# Patient Record
Sex: MALE | Race: WHITE | Employment: OTHER | URBAN - METROPOLITAN AREA
[De-identification: names, ages, dates, MRNs, and addresses within clinical notes are randomized per-mention and may not be internally consistent; named-entity substitution may affect disease eponyms.]

---

## 2018-05-31 PROBLEM — R55 SYNCOPE AND COLLAPSE: Status: ACTIVE | Noted: 2018-05-31

## 2018-05-31 PROBLEM — R07.9 CHEST PAIN: Status: ACTIVE | Noted: 2018-05-31

## 2018-05-31 PROBLEM — E04.1 THYROID NODULE: Status: ACTIVE | Noted: 2018-05-31

## 2018-05-31 PROBLEM — R79.89 ELEVATED SERUM CREATININE: Status: ACTIVE | Noted: 2018-05-31

## 2018-06-01 PROBLEM — R79.89 ELEVATED SERUM CREATININE: Status: RESOLVED | Noted: 2018-05-31 | Resolved: 2018-06-01

## 2018-06-01 PROBLEM — R07.9 CHEST PAIN: Status: RESOLVED | Noted: 2018-05-31 | Resolved: 2018-06-01

## 2018-06-01 PROBLEM — R55 SYNCOPE AND COLLAPSE: Status: RESOLVED | Noted: 2018-05-31 | Resolved: 2018-06-01

## 2018-11-05 ENCOUNTER — APPOINTMENT (OUTPATIENT)
Dept: GENERAL RADIOLOGY | Age: 63
End: 2018-11-05
Payer: OTHER GOVERNMENT

## 2018-11-05 ENCOUNTER — HOSPITAL ENCOUNTER (EMERGENCY)
Age: 63
Discharge: HOME | End: 2018-11-05
Attending: EMERGENCY MEDICINE
Payer: OTHER GOVERNMENT

## 2018-11-05 VITALS
RESPIRATION RATE: 15 BRPM | SYSTOLIC BLOOD PRESSURE: 145 MMHG | DIASTOLIC BLOOD PRESSURE: 78 MMHG | BODY MASS INDEX: 25.18 KG/M2 | TEMPERATURE: 98.7 F | HEIGHT: 73 IN | OXYGEN SATURATION: 96 % | HEART RATE: 100 BPM | WEIGHT: 190 LBS

## 2018-11-05 DIAGNOSIS — J06.9 ACUTE UPPER RESPIRATORY INFECTION: Primary | ICD-10-CM

## 2018-11-05 LAB
RAPID INFLUENZA  B AGN: NEGATIVE
RAPID INFLUENZA A AGN: NEGATIVE

## 2018-11-05 PROCEDURE — 6360000002 HC RX W HCPCS: Performed by: EMERGENCY MEDICINE

## 2018-11-05 PROCEDURE — 6370000000 HC RX 637 (ALT 250 FOR IP): Performed by: EMERGENCY MEDICINE

## 2018-11-05 PROCEDURE — 71046 X-RAY EXAM CHEST 2 VIEWS: CPT

## 2018-11-05 PROCEDURE — 99283 EMERGENCY DEPT VISIT LOW MDM: CPT

## 2018-11-05 PROCEDURE — 87804 INFLUENZA ASSAY W/OPTIC: CPT

## 2018-11-05 RX ORDER — ONDANSETRON 4 MG/1
4 TABLET, ORALLY DISINTEGRATING ORAL ONCE
Status: COMPLETED | OUTPATIENT
Start: 2018-11-05 | End: 2018-11-05

## 2018-11-05 RX ORDER — GUAIFENESIN/DEXTROMETHORPHAN 100-10MG/5
5 SYRUP ORAL 3 TIMES DAILY PRN
Qty: 120 ML | Refills: 0 | Status: SHIPPED | OUTPATIENT
Start: 2018-11-05 | End: 2018-11-13 | Stop reason: ALTCHOICE

## 2018-11-05 RX ORDER — HYDROCODONE BITARTRATE AND ACETAMINOPHEN 5; 325 MG/1; MG/1
2 TABLET ORAL ONCE
Status: COMPLETED | OUTPATIENT
Start: 2018-11-05 | End: 2018-11-05

## 2018-11-05 RX ADMIN — HYDROCODONE BITARTRATE AND ACETAMINOPHEN 2 TABLET: 5; 325 TABLET ORAL at 21:39

## 2018-11-05 RX ADMIN — ONDANSETRON 4 MG: 4 TABLET, ORALLY DISINTEGRATING ORAL at 21:40

## 2018-11-05 ASSESSMENT — PAIN DESCRIPTION - LOCATION: LOCATION: CHEST;HEAD

## 2018-11-05 ASSESSMENT — PAIN DESCRIPTION - FREQUENCY: FREQUENCY: INTERMITTENT

## 2018-11-05 ASSESSMENT — PAIN SCALES - GENERAL: PAINLEVEL_OUTOF10: 6

## 2018-11-06 NOTE — ED PROVIDER NOTES
mouth 3 times daily. .      glipiZIDE (GLUCOTROL) 10 MG tablet Take 10 mg by mouth 2 times daily (before meals).  metFORMIN (GLUCOPHAGE) 1000 MG tablet Take 1,000 mg by mouth 2 times daily (with meals).  insulin glargine (LANTUS) 100 UNIT/ML injection vial Inject 20 Units into the skin nightly       aspirin 81 MG tablet Take 81 mg by mouth daily.  simvastatin (ZOCOR) 10 MG tablet Take  by mouth nightly.  lisinopril (PRINIVIL) 10 MG tablet Take 1 tablet by mouth daily for 7 days. 7 tablet 0     Allergies   Allergen Reactions    Keflex [Cephalexin] Swelling     Nursing Notes Reviewed    Physical Exam:  ED Triage Vitals [11/05/18 2040]   Enc Vitals Group      /89      Pulse 104      Resp 19      Temp 98.7 °F (37.1 °C)      Temp Source Oral      SpO2 96 %      Weight 190 lb (86.2 kg)      Height 6' 1\" (1.854 m)      Head Circumference       Peak Flow       Pain Score       Pain Loc       Pain Edu? Excl. in [address]? GENERAL APPEARANCE: A well-developed well-nourished pleasant uncomfortable 80-year-old male in mild distress   HEAD: Normocephalic, atraumatic  EYES: Sclera anicteric.no conjunctival injection,   ENT: Mucous membranes moist, profuse clear nasal discharge, no stridor, mild posterior pharyngeal hyperemia without exudate   NECK: Supple, no meningismus, no adenopathy,   HEART: RRR without rubs murmurs or gallops  LUNGS:  Clear good air movement no wheezing no retraction or accessory muscle use,  ABDOMEN: Soft, non-tender to palpation, no guarding or rebound. , no mass or distention and no hepatosplenomegaly. EXTREMITIES: No acute deformities, no peripheral edema  SKIN: Warm and dry.  Normal color, no rash,  capillary refill less than 2 seconds  MENTAL STATUS: Alert, oriented, interactive,     Nursing note and vital signs reviewed     I have reviewed and interpreted all of the currently available lab results from this visit (if applicable):  Results for orders placed or

## 2018-11-13 ENCOUNTER — HOSPITAL ENCOUNTER (EMERGENCY)
Age: 63
Discharge: HOME | End: 2018-11-13
Payer: OTHER GOVERNMENT

## 2018-11-13 VITALS
HEART RATE: 86 BPM | RESPIRATION RATE: 17 BRPM | WEIGHT: 190 LBS | HEIGHT: 73 IN | DIASTOLIC BLOOD PRESSURE: 84 MMHG | SYSTOLIC BLOOD PRESSURE: 160 MMHG | TEMPERATURE: 97.4 F | BODY MASS INDEX: 25.18 KG/M2 | OXYGEN SATURATION: 98 %

## 2018-11-13 DIAGNOSIS — R05.9 COUGH: ICD-10-CM

## 2018-11-13 DIAGNOSIS — J01.00 ACUTE MAXILLARY SINUSITIS, RECURRENCE NOT SPECIFIED: Primary | ICD-10-CM

## 2018-11-13 PROCEDURE — 99283 EMERGENCY DEPT VISIT LOW MDM: CPT

## 2018-11-13 RX ORDER — AZITHROMYCIN 250 MG/1
TABLET, FILM COATED ORAL
Qty: 1 PACKET | Refills: 0 | Status: SHIPPED | OUTPATIENT
Start: 2018-11-13 | End: 2018-11-23

## 2018-11-13 RX ORDER — BROMPHENIRAMINE MALEATE, PSEUDOEPHEDRINE HYDROCHLORIDE, AND DEXTROMETHORPHAN HYDROBROMIDE 2; 30; 10 MG/5ML; MG/5ML; MG/5ML
10 SYRUP ORAL 4 TIMES DAILY PRN
Qty: 120 ML | Refills: 0 | Status: ON HOLD | OUTPATIENT
Start: 2018-11-13 | End: 2019-11-08 | Stop reason: ALTCHOICE

## 2018-11-13 ASSESSMENT — ENCOUNTER SYMPTOMS
BACK PAIN: 0
COUGH: 1
CONSTIPATION: 0
EYE REDNESS: 0
SORE THROAT: 0
SINUS PRESSURE: 1
DIARRHEA: 0
SINUS PAIN: 1
FACIAL SWELLING: 0
NAUSEA: 0
ABDOMINAL PAIN: 0
SHORTNESS OF BREATH: 0
EYE PAIN: 0
CHEST TIGHTNESS: 0
RHINORRHEA: 0

## 2018-11-13 ASSESSMENT — PAIN DESCRIPTION - DESCRIPTORS: DESCRIPTORS: ACHING

## 2018-11-13 ASSESSMENT — PAIN DESCRIPTION - PROGRESSION: CLINICAL_PROGRESSION: GRADUALLY WORSENING

## 2018-11-13 ASSESSMENT — PAIN DESCRIPTION - FREQUENCY: FREQUENCY: CONTINUOUS

## 2018-11-13 ASSESSMENT — PAIN DESCRIPTION - ONSET: ONSET: ON-GOING

## 2018-11-13 ASSESSMENT — PAIN DESCRIPTION - LOCATION: LOCATION: FACE

## 2018-11-13 ASSESSMENT — PAIN SCALES - GENERAL: PAINLEVEL_OUTOF10: 10

## 2018-11-13 ASSESSMENT — PAIN DESCRIPTION - ORIENTATION: ORIENTATION: RIGHT

## 2018-11-13 ASSESSMENT — PAIN DESCRIPTION - PAIN TYPE: TYPE: ACUTE PAIN

## 2019-03-02 ENCOUNTER — HOSPITAL ENCOUNTER (EMERGENCY)
Age: 64
Discharge: HOME | End: 2019-03-02
Attending: EMERGENCY MEDICINE
Payer: OTHER GOVERNMENT

## 2019-03-02 VITALS
HEIGHT: 73 IN | WEIGHT: 190 LBS | SYSTOLIC BLOOD PRESSURE: 101 MMHG | RESPIRATION RATE: 18 BRPM | BODY MASS INDEX: 25.18 KG/M2 | TEMPERATURE: 98.6 F | OXYGEN SATURATION: 92 % | HEART RATE: 111 BPM | DIASTOLIC BLOOD PRESSURE: 66 MMHG

## 2019-03-02 DIAGNOSIS — T40.1X1A ACCIDENTAL OVERDOSE OF HEROIN, INITIAL ENCOUNTER (HCC): Primary | ICD-10-CM

## 2019-03-02 LAB
GLUCOSE BLD-MCNC: 197 MG/DL (ref 70–99)
PERFORMED ON: ABNORMAL

## 2019-03-02 PROCEDURE — 99284 EMERGENCY DEPT VISIT MOD MDM: CPT

## 2019-03-02 ASSESSMENT — PAIN DESCRIPTION - LOCATION: LOCATION: LEG

## 2019-03-02 ASSESSMENT — PAIN DESCRIPTION - DESCRIPTORS: DESCRIPTORS: BURNING

## 2019-03-02 ASSESSMENT — PAIN DESCRIPTION - ORIENTATION: ORIENTATION: LEFT;UPPER

## 2019-03-02 ASSESSMENT — PAIN DESCRIPTION - FREQUENCY: FREQUENCY: CONTINUOUS

## 2019-03-02 ASSESSMENT — PAIN DESCRIPTION - PAIN TYPE: TYPE: ACUTE PAIN

## 2019-03-02 ASSESSMENT — PAIN SCALES - GENERAL: PAINLEVEL_OUTOF10: 2

## 2019-09-28 ENCOUNTER — APPOINTMENT (OUTPATIENT)
Dept: GENERAL RADIOLOGY | Age: 64
End: 2019-09-28
Payer: OTHER GOVERNMENT

## 2019-09-28 ENCOUNTER — HOSPITAL ENCOUNTER (EMERGENCY)
Age: 64
Discharge: HOME | End: 2019-09-28
Attending: EMERGENCY MEDICINE
Payer: OTHER GOVERNMENT

## 2019-09-28 VITALS
BODY MASS INDEX: 24.38 KG/M2 | RESPIRATION RATE: 20 BRPM | HEIGHT: 72 IN | TEMPERATURE: 97.4 F | WEIGHT: 180 LBS | OXYGEN SATURATION: 99 % | DIASTOLIC BLOOD PRESSURE: 86 MMHG | SYSTOLIC BLOOD PRESSURE: 124 MMHG | HEART RATE: 94 BPM

## 2019-09-28 DIAGNOSIS — L03.113 CELLULITIS OF ARM, RIGHT: Primary | ICD-10-CM

## 2019-09-28 PROCEDURE — 99283 EMERGENCY DEPT VISIT LOW MDM: CPT

## 2019-09-28 PROCEDURE — 6360000002 HC RX W HCPCS: Performed by: EMERGENCY MEDICINE

## 2019-09-28 PROCEDURE — 90715 TDAP VACCINE 7 YRS/> IM: CPT | Performed by: EMERGENCY MEDICINE

## 2019-09-28 PROCEDURE — 73090 X-RAY EXAM OF FOREARM: CPT

## 2019-09-28 PROCEDURE — 90471 IMMUNIZATION ADMIN: CPT | Performed by: EMERGENCY MEDICINE

## 2019-09-28 RX ORDER — DOXYCYCLINE HYCLATE 100 MG/1
100 CAPSULE ORAL 2 TIMES DAILY
Qty: 20 CAPSULE | Refills: 0 | Status: SHIPPED | OUTPATIENT
Start: 2019-09-28 | End: 2019-10-08

## 2019-09-28 RX ORDER — SULFAMETHOXAZOLE AND TRIMETHOPRIM 800; 160 MG/1; MG/1
1 TABLET ORAL 2 TIMES DAILY
Qty: 20 TABLET | Refills: 0 | Status: SHIPPED | OUTPATIENT
Start: 2019-09-28 | End: 2019-10-08

## 2019-09-28 RX ADMIN — TETANUS TOXOID, REDUCED DIPHTHERIA TOXOID AND ACELLULAR PERTUSSIS VACCINE, ADSORBED 0.5 ML: 5; 2.5; 8; 8; 2.5 SUSPENSION INTRAMUSCULAR at 12:35

## 2019-09-28 ASSESSMENT — ENCOUNTER SYMPTOMS
BACK PAIN: 0
ABDOMINAL PAIN: 0
SHORTNESS OF BREATH: 0

## 2019-09-28 ASSESSMENT — PAIN DESCRIPTION - PAIN TYPE: TYPE: ACUTE PAIN

## 2019-09-28 ASSESSMENT — PAIN DESCRIPTION - LOCATION: LOCATION: ARM

## 2019-09-28 ASSESSMENT — PAIN DESCRIPTION - ORIENTATION: ORIENTATION: RIGHT;MID

## 2019-09-28 ASSESSMENT — PAIN SCALES - GENERAL: PAINLEVEL_OUTOF10: 4

## 2019-11-08 ENCOUNTER — HOSPITAL ENCOUNTER (INPATIENT)
Age: 64
LOS: 2 days | Discharge: HOME HEALTH | DRG: 493 | End: 2019-11-12
Attending: EMERGENCY MEDICINE | Admitting: INTERNAL MEDICINE
Payer: OTHER GOVERNMENT

## 2019-11-08 ENCOUNTER — APPOINTMENT (OUTPATIENT)
Dept: CT IMAGING | Age: 64
DRG: 493 | End: 2019-11-08
Payer: OTHER GOVERNMENT

## 2019-11-08 ENCOUNTER — APPOINTMENT (OUTPATIENT)
Dept: GENERAL RADIOLOGY | Age: 64
DRG: 493 | End: 2019-11-08
Payer: OTHER GOVERNMENT

## 2019-11-08 DIAGNOSIS — R55 SYNCOPE AND COLLAPSE: Primary | ICD-10-CM

## 2019-11-08 DIAGNOSIS — F15.10 METHAMPHETAMINE ABUSE (HCC): ICD-10-CM

## 2019-11-08 DIAGNOSIS — R73.9 HYPERGLYCEMIA: ICD-10-CM

## 2019-11-08 DIAGNOSIS — S82.891A CLOSED FRACTURE OF RIGHT ANKLE, INITIAL ENCOUNTER: ICD-10-CM

## 2019-11-08 LAB
AMPHETAMINE SCREEN, URINE: POSITIVE
ANION GAP SERPL CALCULATED.3IONS-SCNC: 12 MMOL/L (ref 3–16)
BARBITURATE SCREEN URINE: ABNORMAL
BASE EXCESS VENOUS: 2.3 MMOL/L (ref -3–3)
BASOPHILS ABSOLUTE: 0.1 K/UL (ref 0–0.2)
BASOPHILS RELATIVE PERCENT: 0.8 %
BENZODIAZEPINE SCREEN, URINE: ABNORMAL
BILIRUBIN URINE: NEGATIVE
BLOOD, URINE: NEGATIVE
BUN BLDV-MCNC: 12 MG/DL (ref 7–20)
CALCIUM SERPL-MCNC: 8.8 MG/DL (ref 8.3–10.6)
CANNABINOID SCREEN URINE: ABNORMAL
CARBOXYHEMOGLOBIN: 3.7 % (ref 0–1.5)
CHLORIDE BLD-SCNC: 95 MMOL/L (ref 99–110)
CLARITY: CLEAR
CO2: 27 MMOL/L (ref 21–32)
COCAINE METABOLITE SCREEN URINE: ABNORMAL
COLOR: YELLOW
CREAT SERPL-MCNC: 0.7 MG/DL (ref 0.8–1.3)
D DIMER: 258 NG/ML DDU (ref 0–229)
EKG ATRIAL RATE: 101 BPM
EKG DIAGNOSIS: NORMAL
EKG P AXIS: 67 DEGREES
EKG P-R INTERVAL: 172 MS
EKG Q-T INTERVAL: 332 MS
EKG QRS DURATION: 76 MS
EKG QTC CALCULATION (BAZETT): 430 MS
EKG R AXIS: 29 DEGREES
EKG T AXIS: 62 DEGREES
EKG VENTRICULAR RATE: 101 BPM
EOSINOPHILS ABSOLUTE: 0.2 K/UL (ref 0–0.6)
EOSINOPHILS RELATIVE PERCENT: 2.5 %
GFR AFRICAN AMERICAN: >60
GFR NON-AFRICAN AMERICAN: >60
GLUCOSE BLD-MCNC: 271 MG/DL (ref 70–99)
GLUCOSE BLD-MCNC: 411 MG/DL (ref 70–99)
GLUCOSE BLD-MCNC: 418 MG/DL (ref 70–99)
GLUCOSE BLD-MCNC: 454 MG/DL (ref 70–99)
GLUCOSE BLD-MCNC: 506 MG/DL (ref 70–99)
GLUCOSE BLD-MCNC: 539 MG/DL (ref 70–99)
GLUCOSE BLD-MCNC: 556 MG/DL (ref 70–99)
GLUCOSE URINE: >=1000 MG/DL
HCO3 VENOUS: 27.3 MMOL/L (ref 23–29)
HCT VFR BLD CALC: 41.2 % (ref 40.5–52.5)
HEMOGLOBIN: 13.9 G/DL (ref 13.5–17.5)
KETONES, URINE: NEGATIVE MG/DL
LEUKOCYTE ESTERASE, URINE: NEGATIVE
LYMPHOCYTES ABSOLUTE: 1.5 K/UL (ref 1–5.1)
LYMPHOCYTES RELATIVE PERCENT: 18.3 %
Lab: ABNORMAL
MCH RBC QN AUTO: 29.3 PG (ref 26–34)
MCHC RBC AUTO-ENTMCNC: 33.6 G/DL (ref 31–36)
MCV RBC AUTO: 87.3 FL (ref 80–100)
METHADONE SCREEN, URINE: ABNORMAL
METHEMOGLOBIN VENOUS: 0.3 %
MICROSCOPIC EXAMINATION: ABNORMAL
MONOCYTES ABSOLUTE: 0.7 K/UL (ref 0–1.3)
MONOCYTES RELATIVE PERCENT: 8.9 %
NEUTROPHILS ABSOLUTE: 5.7 K/UL (ref 1.7–7.7)
NEUTROPHILS RELATIVE PERCENT: 69.5 %
NITRITE, URINE: NEGATIVE
O2 CONTENT, VEN: 16 VOL %
O2 SAT, VEN: 83 %
O2 THERAPY: ABNORMAL
OPIATE SCREEN URINE: ABNORMAL
OXYCODONE URINE: ABNORMAL
PCO2, VEN: 43.6 MMHG (ref 40–50)
PDW BLD-RTO: 15 % (ref 12.4–15.4)
PERFORMED ON: ABNORMAL
PH UA: 6.5
PH UA: 6.5 (ref 5–8)
PH VENOUS: 7.41 (ref 7.35–7.45)
PHENCYCLIDINE SCREEN URINE: ABNORMAL
PLATELET # BLD: 189 K/UL (ref 135–450)
PMV BLD AUTO: 9.3 FL (ref 5–10.5)
PO2, VEN: 46.1 MMHG (ref 25–40)
POTASSIUM REFLEX MAGNESIUM: 4.3 MMOL/L (ref 3.5–5.1)
PROPOXYPHENE SCREEN: ABNORMAL
PROTEIN UA: NEGATIVE MG/DL
RBC # BLD: 4.72 M/UL (ref 4.2–5.9)
SODIUM BLD-SCNC: 134 MMOL/L (ref 136–145)
SPECIFIC GRAVITY UA: <=1.005 (ref 1–1.03)
TCO2 CALC VENOUS: 29 MMOL/L
TROPONIN: <0.01 NG/ML
URINE REFLEX TO CULTURE: ABNORMAL
URINE TYPE: ABNORMAL
UROBILINOGEN, URINE: 0.2 E.U./DL
WBC # BLD: 8.3 K/UL (ref 4–11)

## 2019-11-08 PROCEDURE — 80048 BASIC METABOLIC PNL TOTAL CA: CPT

## 2019-11-08 PROCEDURE — 84484 ASSAY OF TROPONIN QUANT: CPT

## 2019-11-08 PROCEDURE — 83036 HEMOGLOBIN GLYCOSYLATED A1C: CPT

## 2019-11-08 PROCEDURE — 6370000000 HC RX 637 (ALT 250 FOR IP): Performed by: PHYSICIAN ASSISTANT

## 2019-11-08 PROCEDURE — G0378 HOSPITAL OBSERVATION PER HR: HCPCS

## 2019-11-08 PROCEDURE — 6360000002 HC RX W HCPCS

## 2019-11-08 PROCEDURE — 81003 URINALYSIS AUTO W/O SCOPE: CPT

## 2019-11-08 PROCEDURE — 6360000002 HC RX W HCPCS: Performed by: EMERGENCY MEDICINE

## 2019-11-08 PROCEDURE — 71260 CT THORAX DX C+: CPT

## 2019-11-08 PROCEDURE — 70450 CT HEAD/BRAIN W/O DYE: CPT

## 2019-11-08 PROCEDURE — 93005 ELECTROCARDIOGRAM TRACING: CPT

## 2019-11-08 PROCEDURE — 82803 BLOOD GASES ANY COMBINATION: CPT

## 2019-11-08 PROCEDURE — 6370000000 HC RX 637 (ALT 250 FOR IP): Performed by: EMERGENCY MEDICINE

## 2019-11-08 PROCEDURE — 85025 COMPLETE CBC W/AUTO DIFF WBC: CPT

## 2019-11-08 PROCEDURE — 96375 TX/PRO/DX INJ NEW DRUG ADDON: CPT

## 2019-11-08 PROCEDURE — 6360000004 HC RX CONTRAST MEDICATION: Performed by: INTERNAL MEDICINE

## 2019-11-08 PROCEDURE — 96376 TX/PRO/DX INJ SAME DRUG ADON: CPT

## 2019-11-08 PROCEDURE — 85379 FIBRIN DEGRADATION QUANT: CPT

## 2019-11-08 PROCEDURE — 6360000002 HC RX W HCPCS: Performed by: PHYSICIAN ASSISTANT

## 2019-11-08 PROCEDURE — 99219 PR INITIAL OBSERVATION CARE/DAY 50 MINUTES: CPT | Performed by: PHYSICIAN ASSISTANT

## 2019-11-08 PROCEDURE — 73610 X-RAY EXAM OF ANKLE: CPT

## 2019-11-08 PROCEDURE — 80307 DRUG TEST PRSMV CHEM ANLYZR: CPT

## 2019-11-08 PROCEDURE — 96374 THER/PROPH/DIAG INJ IV PUSH: CPT

## 2019-11-08 PROCEDURE — 99285 EMERGENCY DEPT VISIT HI MDM: CPT

## 2019-11-08 PROCEDURE — 93010 ELECTROCARDIOGRAM REPORT: CPT | Performed by: INTERNAL MEDICINE

## 2019-11-08 PROCEDURE — 2580000003 HC RX 258: Performed by: PHYSICIAN ASSISTANT

## 2019-11-08 PROCEDURE — 36415 COLL VENOUS BLD VENIPUNCTURE: CPT

## 2019-11-08 PROCEDURE — 2580000003 HC RX 258: Performed by: EMERGENCY MEDICINE

## 2019-11-08 PROCEDURE — 96361 HYDRATE IV INFUSION ADD-ON: CPT

## 2019-11-08 PROCEDURE — 4500000025 HC ED LEVEL 5 PROCEDURE

## 2019-11-08 RX ORDER — SIMVASTATIN 10 MG
10 TABLET ORAL NIGHTLY
Status: DISCONTINUED | OUTPATIENT
Start: 2019-11-08 | End: 2019-11-12 | Stop reason: HOSPADM

## 2019-11-08 RX ORDER — MORPHINE SULFATE 2 MG/ML
2 INJECTION, SOLUTION INTRAMUSCULAR; INTRAVENOUS ONCE
Status: COMPLETED | OUTPATIENT
Start: 2019-11-08 | End: 2019-11-08

## 2019-11-08 RX ORDER — MORPHINE SULFATE 2 MG/ML
2 INJECTION, SOLUTION INTRAMUSCULAR; INTRAVENOUS EVERY 4 HOURS PRN
Status: DISCONTINUED | OUTPATIENT
Start: 2019-11-08 | End: 2019-11-08

## 2019-11-08 RX ORDER — DEXTROSE MONOHYDRATE 50 MG/ML
100 INJECTION, SOLUTION INTRAVENOUS PRN
Status: DISCONTINUED | OUTPATIENT
Start: 2019-11-08 | End: 2019-11-12 | Stop reason: HOSPADM

## 2019-11-08 RX ORDER — ACETAMINOPHEN 325 MG/1
650 TABLET ORAL EVERY 4 HOURS PRN
Status: DISCONTINUED | OUTPATIENT
Start: 2019-11-08 | End: 2019-11-12 | Stop reason: HOSPADM

## 2019-11-08 RX ORDER — KETOROLAC TROMETHAMINE 30 MG/ML
INJECTION, SOLUTION INTRAMUSCULAR; INTRAVENOUS
Status: COMPLETED
Start: 2019-11-08 | End: 2019-11-08

## 2019-11-08 RX ORDER — GLIPIZIDE 10 MG/1
10 TABLET ORAL
Status: DISCONTINUED | OUTPATIENT
Start: 2019-11-08 | End: 2019-11-08

## 2019-11-08 RX ORDER — DEXTROSE MONOHYDRATE 25 G/50ML
12.5 INJECTION, SOLUTION INTRAVENOUS PRN
Status: DISCONTINUED | OUTPATIENT
Start: 2019-11-08 | End: 2019-11-12 | Stop reason: HOSPADM

## 2019-11-08 RX ORDER — 0.9 % SODIUM CHLORIDE 0.9 %
1000 INTRAVENOUS SOLUTION INTRAVENOUS ONCE
Status: COMPLETED | OUTPATIENT
Start: 2019-11-08 | End: 2019-11-08

## 2019-11-08 RX ORDER — KETOROLAC TROMETHAMINE 30 MG/ML
15 INJECTION, SOLUTION INTRAMUSCULAR; INTRAVENOUS EVERY 6 HOURS PRN
Status: DISPENSED | OUTPATIENT
Start: 2019-11-08 | End: 2019-11-10

## 2019-11-08 RX ORDER — SODIUM CHLORIDE 9 MG/ML
INJECTION, SOLUTION INTRAVENOUS CONTINUOUS
Status: DISCONTINUED | OUTPATIENT
Start: 2019-11-08 | End: 2019-11-11

## 2019-11-08 RX ORDER — NICOTINE POLACRILEX 4 MG
15 LOZENGE BUCCAL PRN
Status: DISCONTINUED | OUTPATIENT
Start: 2019-11-08 | End: 2019-11-12 | Stop reason: HOSPADM

## 2019-11-08 RX ORDER — ASPIRIN 81 MG/1
81 TABLET, CHEWABLE ORAL DAILY
Status: DISCONTINUED | OUTPATIENT
Start: 2019-11-08 | End: 2019-11-12 | Stop reason: HOSPADM

## 2019-11-08 RX ORDER — SODIUM CHLORIDE 0.9 % (FLUSH) 0.9 %
10 SYRINGE (ML) INJECTION PRN
Status: DISCONTINUED | OUTPATIENT
Start: 2019-11-08 | End: 2019-11-10 | Stop reason: SDUPTHER

## 2019-11-08 RX ORDER — MORPHINE SULFATE 2 MG/ML
2 INJECTION, SOLUTION INTRAMUSCULAR; INTRAVENOUS EVERY 4 HOURS PRN
Status: DISCONTINUED | OUTPATIENT
Start: 2019-11-08 | End: 2019-11-11

## 2019-11-08 RX ORDER — KETOROLAC TROMETHAMINE 30 MG/ML
30 INJECTION, SOLUTION INTRAMUSCULAR; INTRAVENOUS ONCE
Status: COMPLETED | OUTPATIENT
Start: 2019-11-08 | End: 2019-11-08

## 2019-11-08 RX ORDER — SODIUM CHLORIDE 0.9 % (FLUSH) 0.9 %
10 SYRINGE (ML) INJECTION EVERY 12 HOURS SCHEDULED
Status: DISCONTINUED | OUTPATIENT
Start: 2019-11-08 | End: 2019-11-10 | Stop reason: SDUPTHER

## 2019-11-08 RX ORDER — ONDANSETRON 2 MG/ML
4 INJECTION INTRAMUSCULAR; INTRAVENOUS EVERY 6 HOURS PRN
Status: DISCONTINUED | OUTPATIENT
Start: 2019-11-08 | End: 2019-11-12 | Stop reason: HOSPADM

## 2019-11-08 RX ORDER — TERAZOSIN 1 MG/1
1 CAPSULE ORAL NIGHTLY
COMMUNITY

## 2019-11-08 RX ORDER — MORPHINE SULFATE 4 MG/ML
INJECTION, SOLUTION INTRAMUSCULAR; INTRAVENOUS
Status: COMPLETED
Start: 2019-11-08 | End: 2019-11-08

## 2019-11-08 RX ORDER — MELOXICAM 10 MG/1
CAPSULE ORAL
Status: ON HOLD | COMMUNITY
End: 2019-11-11 | Stop reason: HOSPADM

## 2019-11-08 RX ORDER — OXYCODONE HYDROCHLORIDE 5 MG/1
5 TABLET ORAL EVERY 6 HOURS PRN
Status: DISCONTINUED | OUTPATIENT
Start: 2019-11-08 | End: 2019-11-12 | Stop reason: HOSPADM

## 2019-11-08 RX ORDER — INSULIN GLARGINE 100 [IU]/ML
25 INJECTION, SOLUTION SUBCUTANEOUS NIGHTLY
Status: DISCONTINUED | OUTPATIENT
Start: 2019-11-08 | End: 2019-11-12 | Stop reason: HOSPADM

## 2019-11-08 RX ORDER — DOXAZOSIN 2 MG/1
1 TABLET ORAL DAILY
Status: DISCONTINUED | OUTPATIENT
Start: 2019-11-08 | End: 2019-11-12 | Stop reason: HOSPADM

## 2019-11-08 RX ADMIN — INSULIN LISPRO 9 UNITS: 100 INJECTION, SOLUTION INTRAVENOUS; SUBCUTANEOUS at 17:59

## 2019-11-08 RX ADMIN — IOPAMIDOL 85 ML: 755 INJECTION, SOLUTION INTRAVENOUS at 20:46

## 2019-11-08 RX ADMIN — SODIUM CHLORIDE: 9 INJECTION, SOLUTION INTRAVENOUS at 17:51

## 2019-11-08 RX ADMIN — INSULIN LISPRO 9 UNITS: 100 INJECTION, SOLUTION INTRAVENOUS; SUBCUTANEOUS at 21:26

## 2019-11-08 RX ADMIN — MORPHINE SULFATE 2 MG: 2 INJECTION, SOLUTION INTRAMUSCULAR; INTRAVENOUS at 12:45

## 2019-11-08 RX ADMIN — MORPHINE SULFATE 2 MG: 4 INJECTION INTRAVENOUS at 11:56

## 2019-11-08 RX ADMIN — SIMVASTATIN 10 MG: 10 TABLET, FILM COATED ORAL at 21:25

## 2019-11-08 RX ADMIN — ASPIRIN 81 MG 81 MG: 81 TABLET ORAL at 17:51

## 2019-11-08 RX ADMIN — KETOROLAC TROMETHAMINE 30 MG: 30 INJECTION, SOLUTION INTRAMUSCULAR; INTRAVENOUS at 13:27

## 2019-11-08 RX ADMIN — DOXAZOSIN 1 MG: 2 TABLET ORAL at 17:51

## 2019-11-08 RX ADMIN — SODIUM CHLORIDE 1000 ML: 9 INJECTION, SOLUTION INTRAVENOUS at 11:56

## 2019-11-08 RX ADMIN — SODIUM CHLORIDE 1000 ML: 9 INJECTION, SOLUTION INTRAVENOUS at 13:26

## 2019-11-08 RX ADMIN — KETOROLAC TROMETHAMINE 15 MG: 30 INJECTION, SOLUTION INTRAMUSCULAR at 17:58

## 2019-11-08 RX ADMIN — INSULIN HUMAN 5 UNITS: 100 INJECTION, SOLUTION PARENTERAL at 13:27

## 2019-11-08 RX ADMIN — INSULIN GLARGINE 25 UNITS: 100 INJECTION, SOLUTION SUBCUTANEOUS at 21:25

## 2019-11-08 RX ADMIN — OXYCODONE HYDROCHLORIDE 5 MG: 5 TABLET ORAL at 21:36

## 2019-11-08 RX ADMIN — INSULIN HUMAN 10 UNITS: 100 INJECTION, SOLUTION PARENTERAL at 11:56

## 2019-11-08 RX ADMIN — Medication 10 ML: at 21:25

## 2019-11-08 ASSESSMENT — PAIN DESCRIPTION - ORIENTATION
ORIENTATION: RIGHT
ORIENTATION: RIGHT

## 2019-11-08 ASSESSMENT — PAIN SCALES - GENERAL
PAINLEVEL_OUTOF10: 3
PAINLEVEL_OUTOF10: 6
PAINLEVEL_OUTOF10: 6
PAINLEVEL_OUTOF10: 8
PAINLEVEL_OUTOF10: 7
PAINLEVEL_OUTOF10: 8
PAINLEVEL_OUTOF10: 6

## 2019-11-08 ASSESSMENT — PAIN DESCRIPTION - ONSET: ONSET: ON-GOING

## 2019-11-08 ASSESSMENT — PAIN DESCRIPTION - PROGRESSION: CLINICAL_PROGRESSION: GRADUALLY IMPROVING

## 2019-11-08 ASSESSMENT — PAIN DESCRIPTION - LOCATION
LOCATION: ANKLE
LOCATION: ANKLE

## 2019-11-08 ASSESSMENT — PAIN DESCRIPTION - PAIN TYPE
TYPE: ACUTE PAIN
TYPE: ACUTE PAIN

## 2019-11-08 ASSESSMENT — PAIN DESCRIPTION - DESCRIPTORS
DESCRIPTORS: ACHING;BURNING
DESCRIPTORS: ACHING

## 2019-11-09 LAB
ANION GAP SERPL CALCULATED.3IONS-SCNC: 8 MMOL/L (ref 3–16)
BASOPHILS ABSOLUTE: 0.1 K/UL (ref 0–0.2)
BASOPHILS RELATIVE PERCENT: 1 %
BUN BLDV-MCNC: 14 MG/DL (ref 7–20)
CALCIUM SERPL-MCNC: 7.9 MG/DL (ref 8.3–10.6)
CHLORIDE BLD-SCNC: 98 MMOL/L (ref 99–110)
CO2: 23 MMOL/L (ref 21–32)
CREAT SERPL-MCNC: 0.7 MG/DL (ref 0.8–1.3)
EOSINOPHILS ABSOLUTE: 0.3 K/UL (ref 0–0.6)
EOSINOPHILS RELATIVE PERCENT: 4.9 %
ESTIMATED AVERAGE GLUCOSE: 389.5 MG/DL
GFR AFRICAN AMERICAN: >60
GFR NON-AFRICAN AMERICAN: >60
GLUCOSE BLD-MCNC: 204 MG/DL (ref 70–99)
GLUCOSE BLD-MCNC: 267 MG/DL (ref 70–99)
GLUCOSE BLD-MCNC: 284 MG/DL (ref 70–99)
GLUCOSE BLD-MCNC: 416 MG/DL (ref 70–99)
GLUCOSE BLD-MCNC: 449 MG/DL (ref 70–99)
HBA1C MFR BLD: 15.2 %
HCT VFR BLD CALC: 33.2 % (ref 40.5–52.5)
HEMOGLOBIN: 11.3 G/DL (ref 13.5–17.5)
LV EF: 60 %
LVEF MODALITY: NORMAL
LYMPHOCYTES ABSOLUTE: 1.3 K/UL (ref 1–5.1)
LYMPHOCYTES RELATIVE PERCENT: 24.5 %
MCH RBC QN AUTO: 29.9 PG (ref 26–34)
MCHC RBC AUTO-ENTMCNC: 34 G/DL (ref 31–36)
MCV RBC AUTO: 88 FL (ref 80–100)
MONOCYTES ABSOLUTE: 0.5 K/UL (ref 0–1.3)
MONOCYTES RELATIVE PERCENT: 9.8 %
NEUTROPHILS ABSOLUTE: 3.1 K/UL (ref 1.7–7.7)
NEUTROPHILS RELATIVE PERCENT: 59.8 %
PDW BLD-RTO: 15.2 % (ref 12.4–15.4)
PERFORMED ON: ABNORMAL
PLATELET # BLD: 145 K/UL (ref 135–450)
PMV BLD AUTO: 9.4 FL (ref 5–10.5)
POTASSIUM REFLEX MAGNESIUM: 3.9 MMOL/L (ref 3.5–5.1)
RBC # BLD: 3.77 M/UL (ref 4.2–5.9)
SODIUM BLD-SCNC: 129 MMOL/L (ref 136–145)
WBC # BLD: 5.2 K/UL (ref 4–11)

## 2019-11-09 PROCEDURE — G0378 HOSPITAL OBSERVATION PER HR: HCPCS

## 2019-11-09 PROCEDURE — 80048 BASIC METABOLIC PNL TOTAL CA: CPT

## 2019-11-09 PROCEDURE — 6370000000 HC RX 637 (ALT 250 FOR IP): Performed by: PHYSICIAN ASSISTANT

## 2019-11-09 PROCEDURE — 96376 TX/PRO/DX INJ SAME DRUG ADON: CPT

## 2019-11-09 PROCEDURE — 85025 COMPLETE CBC W/AUTO DIFF WBC: CPT

## 2019-11-09 PROCEDURE — 93306 TTE W/DOPPLER COMPLETE: CPT

## 2019-11-09 PROCEDURE — 6360000002 HC RX W HCPCS: Performed by: PHYSICIAN ASSISTANT

## 2019-11-09 PROCEDURE — 2580000003 HC RX 258: Performed by: PHYSICIAN ASSISTANT

## 2019-11-09 PROCEDURE — 96372 THER/PROPH/DIAG INJ SC/IM: CPT

## 2019-11-09 PROCEDURE — 36415 COLL VENOUS BLD VENIPUNCTURE: CPT

## 2019-11-09 RX ADMIN — Medication 10 ML: at 08:44

## 2019-11-09 RX ADMIN — INSULIN LISPRO 6 UNITS: 100 INJECTION, SOLUTION INTRAVENOUS; SUBCUTANEOUS at 18:20

## 2019-11-09 RX ADMIN — OXYCODONE HYDROCHLORIDE 5 MG: 5 TABLET ORAL at 16:10

## 2019-11-09 RX ADMIN — SIMVASTATIN 10 MG: 10 TABLET, FILM COATED ORAL at 20:40

## 2019-11-09 RX ADMIN — KETOROLAC TROMETHAMINE 15 MG: 30 INJECTION, SOLUTION INTRAMUSCULAR at 10:20

## 2019-11-09 RX ADMIN — INSULIN LISPRO 5 UNITS: 100 INJECTION, SOLUTION INTRAVENOUS; SUBCUTANEOUS at 13:17

## 2019-11-09 RX ADMIN — SODIUM CHLORIDE: 9 INJECTION, SOLUTION INTRAVENOUS at 21:25

## 2019-11-09 RX ADMIN — Medication 10 ML: at 20:40

## 2019-11-09 RX ADMIN — KETOROLAC TROMETHAMINE 15 MG: 30 INJECTION, SOLUTION INTRAMUSCULAR at 00:10

## 2019-11-09 RX ADMIN — DOXAZOSIN 1 MG: 2 TABLET ORAL at 08:41

## 2019-11-09 RX ADMIN — INSULIN GLARGINE 25 UNITS: 100 INJECTION, SOLUTION SUBCUTANEOUS at 20:41

## 2019-11-09 RX ADMIN — ASPIRIN 81 MG 81 MG: 81 TABLET ORAL at 08:41

## 2019-11-09 RX ADMIN — OXYCODONE HYDROCHLORIDE 5 MG: 5 TABLET ORAL at 08:41

## 2019-11-09 RX ADMIN — INSULIN LISPRO 18 UNITS: 100 INJECTION, SOLUTION INTRAVENOUS; SUBCUTANEOUS at 13:17

## 2019-11-09 RX ADMIN — OXYCODONE HYDROCHLORIDE 5 MG: 5 TABLET ORAL at 23:11

## 2019-11-09 RX ADMIN — INSULIN LISPRO 5 UNITS: 100 INJECTION, SOLUTION INTRAVENOUS; SUBCUTANEOUS at 18:20

## 2019-11-09 RX ADMIN — INSULIN LISPRO 18 UNITS: 100 INJECTION, SOLUTION INTRAVENOUS; SUBCUTANEOUS at 09:38

## 2019-11-09 RX ADMIN — SODIUM CHLORIDE: 9 INJECTION, SOLUTION INTRAVENOUS at 11:30

## 2019-11-09 RX ADMIN — INSULIN LISPRO 5 UNITS: 100 INJECTION, SOLUTION INTRAVENOUS; SUBCUTANEOUS at 20:41

## 2019-11-09 RX ADMIN — ENOXAPARIN SODIUM 40 MG: 40 INJECTION SUBCUTANEOUS at 08:44

## 2019-11-09 ASSESSMENT — PAIN SCALES - GENERAL
PAINLEVEL_OUTOF10: 5
PAINLEVEL_OUTOF10: 8
PAINLEVEL_OUTOF10: 6
PAINLEVEL_OUTOF10: 6
PAINLEVEL_OUTOF10: 9
PAINLEVEL_OUTOF10: 4

## 2019-11-09 ASSESSMENT — PAIN DESCRIPTION - PAIN TYPE: TYPE: ACUTE PAIN

## 2019-11-09 ASSESSMENT — PAIN DESCRIPTION - ORIENTATION: ORIENTATION: RIGHT

## 2019-11-09 ASSESSMENT — PAIN DESCRIPTION - DESCRIPTORS: DESCRIPTORS: ACHING

## 2019-11-09 ASSESSMENT — PAIN DESCRIPTION - LOCATION: LOCATION: ANKLE

## 2019-11-10 ENCOUNTER — ANESTHESIA EVENT (OUTPATIENT)
Dept: OPERATING ROOM | Age: 64
DRG: 493 | End: 2019-11-10
Payer: OTHER GOVERNMENT

## 2019-11-10 ENCOUNTER — APPOINTMENT (OUTPATIENT)
Dept: GENERAL RADIOLOGY | Age: 64
DRG: 493 | End: 2019-11-10
Payer: OTHER GOVERNMENT

## 2019-11-10 ENCOUNTER — ANESTHESIA (OUTPATIENT)
Dept: OPERATING ROOM | Age: 64
DRG: 493 | End: 2019-11-10
Payer: OTHER GOVERNMENT

## 2019-11-10 VITALS — OXYGEN SATURATION: 100 % | DIASTOLIC BLOOD PRESSURE: 85 MMHG | SYSTOLIC BLOOD PRESSURE: 135 MMHG

## 2019-11-10 LAB
ANION GAP SERPL CALCULATED.3IONS-SCNC: 8 MMOL/L (ref 3–16)
BUN BLDV-MCNC: 9 MG/DL (ref 7–20)
CALCIUM SERPL-MCNC: 8 MG/DL (ref 8.3–10.6)
CHLORIDE BLD-SCNC: 95 MMOL/L (ref 99–110)
CO2: 26 MMOL/L (ref 21–32)
CREAT SERPL-MCNC: 0.6 MG/DL (ref 0.8–1.3)
GFR AFRICAN AMERICAN: >60
GFR NON-AFRICAN AMERICAN: >60
GLUCOSE BLD-MCNC: 121 MG/DL (ref 70–99)
GLUCOSE BLD-MCNC: 128 MG/DL (ref 70–99)
GLUCOSE BLD-MCNC: 185 MG/DL (ref 70–99)
GLUCOSE BLD-MCNC: 226 MG/DL (ref 70–99)
GLUCOSE BLD-MCNC: 280 MG/DL (ref 70–99)
GLUCOSE BLD-MCNC: 394 MG/DL (ref 70–99)
GLUCOSE BLD-MCNC: 99 MG/DL (ref 70–99)
PERFORMED ON: ABNORMAL
PERFORMED ON: NORMAL
POTASSIUM REFLEX MAGNESIUM: 4.2 MMOL/L (ref 3.5–5.1)
SODIUM BLD-SCNC: 129 MMOL/L (ref 136–145)

## 2019-11-10 PROCEDURE — 2580000003 HC RX 258: Performed by: ORTHOPAEDIC SURGERY

## 2019-11-10 PROCEDURE — 94150 VITAL CAPACITY TEST: CPT

## 2019-11-10 PROCEDURE — 2720000010 HC SURG SUPPLY STERILE: Performed by: ORTHOPAEDIC SURGERY

## 2019-11-10 PROCEDURE — 3600000014 HC SURGERY LEVEL 4 ADDTL 15MIN: Performed by: ORTHOPAEDIC SURGERY

## 2019-11-10 PROCEDURE — 0QSJ04Z REPOSITION RIGHT FIBULA WITH INTERNAL FIXATION DEVICE, OPEN APPROACH: ICD-10-PCS | Performed by: ORTHOPAEDIC SURGERY

## 2019-11-10 PROCEDURE — 2060000000 HC ICU INTERMEDIATE R&B

## 2019-11-10 PROCEDURE — 80048 BASIC METABOLIC PNL TOTAL CA: CPT

## 2019-11-10 PROCEDURE — 2500000003 HC RX 250 WO HCPCS: Performed by: ANESTHESIOLOGY

## 2019-11-10 PROCEDURE — 6370000000 HC RX 637 (ALT 250 FOR IP): Performed by: ORTHOPAEDIC SURGERY

## 2019-11-10 PROCEDURE — 6360000002 HC RX W HCPCS: Performed by: ANESTHESIOLOGY

## 2019-11-10 PROCEDURE — 3700000001 HC ADD 15 MINUTES (ANESTHESIA): Performed by: ORTHOPAEDIC SURGERY

## 2019-11-10 PROCEDURE — 7100000000 HC PACU RECOVERY - FIRST 15 MIN: Performed by: ORTHOPAEDIC SURGERY

## 2019-11-10 PROCEDURE — C1713 ANCHOR/SCREW BN/BN,TIS/BN: HCPCS | Performed by: ORTHOPAEDIC SURGERY

## 2019-11-10 PROCEDURE — 2709999900 HC NON-CHARGEABLE SUPPLY: Performed by: ORTHOPAEDIC SURGERY

## 2019-11-10 PROCEDURE — 3600000004 HC SURGERY LEVEL 4 BASE: Performed by: ORTHOPAEDIC SURGERY

## 2019-11-10 PROCEDURE — 6360000002 HC RX W HCPCS: Performed by: ORTHOPAEDIC SURGERY

## 2019-11-10 PROCEDURE — 2580000003 HC RX 258: Performed by: ANESTHESIOLOGY

## 2019-11-10 PROCEDURE — 7100000001 HC PACU RECOVERY - ADDTL 15 MIN: Performed by: ORTHOPAEDIC SURGERY

## 2019-11-10 PROCEDURE — 36415 COLL VENOUS BLD VENIPUNCTURE: CPT

## 2019-11-10 PROCEDURE — 6360000002 HC RX W HCPCS: Performed by: PHYSICIAN ASSISTANT

## 2019-11-10 PROCEDURE — 2500000003 HC RX 250 WO HCPCS: Performed by: ORTHOPAEDIC SURGERY

## 2019-11-10 PROCEDURE — 3700000000 HC ANESTHESIA ATTENDED CARE: Performed by: ORTHOPAEDIC SURGERY

## 2019-11-10 PROCEDURE — 6370000000 HC RX 637 (ALT 250 FOR IP): Performed by: PHYSICIAN ASSISTANT

## 2019-11-10 PROCEDURE — 3209999900 FLUORO FOR SURGICAL PROCEDURES

## 2019-11-10 PROCEDURE — 2580000003 HC RX 258: Performed by: PHYSICIAN ASSISTANT

## 2019-11-10 DEVICE — PLATE BNE L106MM 6 H R LAT DST PERIARTC FIBULAR S STL LOK: Type: IMPLANTABLE DEVICE | Site: ANKLE | Status: FUNCTIONAL

## 2019-11-10 DEVICE — SCREW BNE L14MM DIA2.7MM SM HEX HD DIA2.5MM CORT S STL ST: Type: IMPLANTABLE DEVICE | Site: ANKLE | Status: FUNCTIONAL

## 2019-11-10 DEVICE — SCREW BNE L16MM DIA2.7MM HEX HD DIA2.5MM CANC BIODUR 108C: Type: IMPLANTABLE DEVICE | Site: ANKLE | Status: FUNCTIONAL

## 2019-11-10 DEVICE — SCREW BNE L18MM DIA3.5MM HD DIA2.7MM PERIARTC CORT S STL ST: Type: IMPLANTABLE DEVICE | Site: ANKLE | Status: FUNCTIONAL

## 2019-11-10 DEVICE — SCREW BNE L18MM DIA2.7MM HEX HD DIA2.5MM CANC BIODUR 108C: Type: IMPLANTABLE DEVICE | Site: ANKLE | Status: FUNCTIONAL

## 2019-11-10 DEVICE — SCREW BNE L16MM DIA3.5MM HD DIA2.7MM PERIARTC CORT S STL ST: Type: IMPLANTABLE DEVICE | Site: ANKLE | Status: FUNCTIONAL

## 2019-11-10 RX ORDER — ONDANSETRON 2 MG/ML
4 INJECTION INTRAMUSCULAR; INTRAVENOUS
Status: DISCONTINUED | OUTPATIENT
Start: 2019-11-10 | End: 2019-11-10 | Stop reason: HOSPADM

## 2019-11-10 RX ORDER — MORPHINE SULFATE 10 MG/ML
1 INJECTION, SOLUTION INTRAMUSCULAR; INTRAVENOUS EVERY 5 MIN PRN
Status: DISCONTINUED | OUTPATIENT
Start: 2019-11-10 | End: 2019-11-10 | Stop reason: HOSPADM

## 2019-11-10 RX ORDER — GABAPENTIN 400 MG/1
800 CAPSULE ORAL 3 TIMES DAILY
Status: DISCONTINUED | OUTPATIENT
Start: 2019-11-10 | End: 2019-11-12 | Stop reason: HOSPADM

## 2019-11-10 RX ORDER — OXYCODONE HYDROCHLORIDE AND ACETAMINOPHEN 5; 325 MG/1; MG/1
2 TABLET ORAL PRN
Status: DISCONTINUED | OUTPATIENT
Start: 2019-11-10 | End: 2019-11-10 | Stop reason: HOSPADM

## 2019-11-10 RX ORDER — SODIUM CHLORIDE 0.9 % (FLUSH) 0.9 %
10 SYRINGE (ML) INJECTION EVERY 12 HOURS SCHEDULED
Status: DISCONTINUED | OUTPATIENT
Start: 2019-11-10 | End: 2019-11-12 | Stop reason: HOSPADM

## 2019-11-10 RX ORDER — MIDAZOLAM HYDROCHLORIDE 1 MG/ML
INJECTION INTRAMUSCULAR; INTRAVENOUS PRN
Status: DISCONTINUED | OUTPATIENT
Start: 2019-11-10 | End: 2019-11-10 | Stop reason: SDUPTHER

## 2019-11-10 RX ORDER — FENTANYL CITRATE 50 UG/ML
INJECTION, SOLUTION INTRAMUSCULAR; INTRAVENOUS PRN
Status: DISCONTINUED | OUTPATIENT
Start: 2019-11-10 | End: 2019-11-10 | Stop reason: SDUPTHER

## 2019-11-10 RX ORDER — SODIUM CHLORIDE 9 MG/ML
INJECTION, SOLUTION INTRAVENOUS CONTINUOUS PRN
Status: DISCONTINUED | OUTPATIENT
Start: 2019-11-10 | End: 2019-11-10 | Stop reason: SDUPTHER

## 2019-11-10 RX ORDER — SUCCINYLCHOLINE/SOD CL,ISO/PF 100 MG/5ML
SYRINGE (ML) INTRAVENOUS PRN
Status: DISCONTINUED | OUTPATIENT
Start: 2019-11-10 | End: 2019-11-10 | Stop reason: SDUPTHER

## 2019-11-10 RX ORDER — MORPHINE SULFATE 10 MG/ML
2 INJECTION, SOLUTION INTRAMUSCULAR; INTRAVENOUS EVERY 5 MIN PRN
Status: DISCONTINUED | OUTPATIENT
Start: 2019-11-10 | End: 2019-11-10 | Stop reason: HOSPADM

## 2019-11-10 RX ORDER — BUPIVACAINE HYDROCHLORIDE 5 MG/ML
INJECTION, SOLUTION PERINEURAL PRN
Status: DISCONTINUED | OUTPATIENT
Start: 2019-11-10 | End: 2019-11-10 | Stop reason: ALTCHOICE

## 2019-11-10 RX ORDER — ATROPINE SULFATE 0.4 MG/ML
AMPUL (ML) INJECTION PRN
Status: DISCONTINUED | OUTPATIENT
Start: 2019-11-10 | End: 2019-11-10 | Stop reason: SDUPTHER

## 2019-11-10 RX ORDER — KETOROLAC TROMETHAMINE 30 MG/ML
30 INJECTION, SOLUTION INTRAMUSCULAR; INTRAVENOUS ONCE
Status: COMPLETED | OUTPATIENT
Start: 2019-11-10 | End: 2019-11-10

## 2019-11-10 RX ORDER — PROPOFOL 10 MG/ML
INJECTION, EMULSION INTRAVENOUS PRN
Status: DISCONTINUED | OUTPATIENT
Start: 2019-11-10 | End: 2019-11-10 | Stop reason: SDUPTHER

## 2019-11-10 RX ORDER — SODIUM CHLORIDE 0.9 % (FLUSH) 0.9 %
10 SYRINGE (ML) INJECTION PRN
Status: DISCONTINUED | OUTPATIENT
Start: 2019-11-10 | End: 2019-11-12 | Stop reason: HOSPADM

## 2019-11-10 RX ORDER — OXYCODONE HYDROCHLORIDE AND ACETAMINOPHEN 5; 325 MG/1; MG/1
1 TABLET ORAL PRN
Status: DISCONTINUED | OUTPATIENT
Start: 2019-11-10 | End: 2019-11-10 | Stop reason: HOSPADM

## 2019-11-10 RX ORDER — MAGNESIUM HYDROXIDE 1200 MG/15ML
LIQUID ORAL CONTINUOUS PRN
Status: COMPLETED | OUTPATIENT
Start: 2019-11-10 | End: 2019-11-10

## 2019-11-10 RX ORDER — DOCUSATE SODIUM 100 MG/1
100 CAPSULE, LIQUID FILLED ORAL 2 TIMES DAILY
Status: DISCONTINUED | OUTPATIENT
Start: 2019-11-10 | End: 2019-11-12 | Stop reason: HOSPADM

## 2019-11-10 RX ADMIN — ENOXAPARIN SODIUM 40 MG: 40 INJECTION SUBCUTANEOUS at 12:04

## 2019-11-10 RX ADMIN — INSULIN LISPRO 3 UNITS: 100 INJECTION, SOLUTION INTRAVENOUS; SUBCUTANEOUS at 12:10

## 2019-11-10 RX ADMIN — INSULIN GLARGINE 25 UNITS: 100 INJECTION, SOLUTION SUBCUTANEOUS at 20:33

## 2019-11-10 RX ADMIN — GABAPENTIN 800 MG: 400 CAPSULE ORAL at 20:30

## 2019-11-10 RX ADMIN — HYDROMORPHONE HYDROCHLORIDE 0.5 MG: 1 INJECTION, SOLUTION INTRAMUSCULAR; INTRAVENOUS; SUBCUTANEOUS at 10:40

## 2019-11-10 RX ADMIN — GABAPENTIN 800 MG: 400 CAPSULE ORAL at 14:33

## 2019-11-10 RX ADMIN — SODIUM CHLORIDE: 9 INJECTION, SOLUTION INTRAVENOUS at 20:38

## 2019-11-10 RX ADMIN — Medication 10 ML: at 12:04

## 2019-11-10 RX ADMIN — KETOROLAC TROMETHAMINE 15 MG: 30 INJECTION, SOLUTION INTRAMUSCULAR at 16:02

## 2019-11-10 RX ADMIN — OXYCODONE HYDROCHLORIDE 5 MG: 5 TABLET ORAL at 12:03

## 2019-11-10 RX ADMIN — Medication 80 MG: at 08:14

## 2019-11-10 RX ADMIN — DOXAZOSIN 1 MG: 2 TABLET ORAL at 12:03

## 2019-11-10 RX ADMIN — PROPOFOL 160 MG: 10 INJECTION, EMULSION INTRAVENOUS at 08:14

## 2019-11-10 RX ADMIN — VANCOMYCIN HYDROCHLORIDE 1 G: 1 INJECTION, POWDER, LYOPHILIZED, FOR SOLUTION INTRAVENOUS at 08:17

## 2019-11-10 RX ADMIN — FENTANYL CITRATE 100 MCG: 50 INJECTION INTRAMUSCULAR; INTRAVENOUS at 08:14

## 2019-11-10 RX ADMIN — FAMOTIDINE 20 MG: 10 INJECTION, SOLUTION INTRAVENOUS at 07:54

## 2019-11-10 RX ADMIN — PHENYLEPHRINE HYDROCHLORIDE 50 MCG: 10 INJECTION INTRAVENOUS at 08:55

## 2019-11-10 RX ADMIN — SODIUM CHLORIDE: 9 INJECTION, SOLUTION INTRAVENOUS at 09:19

## 2019-11-10 RX ADMIN — SIMVASTATIN 10 MG: 10 TABLET, FILM COATED ORAL at 20:30

## 2019-11-10 RX ADMIN — INSULIN LISPRO 3 UNITS: 100 INJECTION, SOLUTION INTRAVENOUS; SUBCUTANEOUS at 20:32

## 2019-11-10 RX ADMIN — ASPIRIN 81 MG 81 MG: 81 TABLET ORAL at 12:03

## 2019-11-10 RX ADMIN — Medication 0.2 MG: at 08:47

## 2019-11-10 RX ADMIN — OXYCODONE HYDROCHLORIDE 5 MG: 5 TABLET ORAL at 10:56

## 2019-11-10 RX ADMIN — SODIUM CHLORIDE: 9 INJECTION, SOLUTION INTRAVENOUS at 06:46

## 2019-11-10 RX ADMIN — ACETAMINOPHEN 650 MG: 325 TABLET ORAL at 20:51

## 2019-11-10 RX ADMIN — HYDROMORPHONE HYDROCHLORIDE 0.5 MG: 1 INJECTION, SOLUTION INTRAMUSCULAR; INTRAVENOUS; SUBCUTANEOUS at 10:20

## 2019-11-10 RX ADMIN — KETOROLAC TROMETHAMINE 30 MG: 30 INJECTION, SOLUTION INTRAMUSCULAR at 10:12

## 2019-11-10 RX ADMIN — PHENYLEPHRINE HYDROCHLORIDE 50 MCG: 10 INJECTION INTRAVENOUS at 08:49

## 2019-11-10 RX ADMIN — MIDAZOLAM 2 MG: 1 INJECTION INTRAMUSCULAR; INTRAVENOUS at 08:06

## 2019-11-10 RX ADMIN — VANCOMYCIN HYDROCHLORIDE 1000 MG: 1 INJECTION, POWDER, LYOPHILIZED, FOR SOLUTION INTRAVENOUS at 07:49

## 2019-11-10 RX ADMIN — DOCUSATE SODIUM 100 MG: 100 CAPSULE, LIQUID FILLED ORAL at 12:07

## 2019-11-10 RX ADMIN — INSULIN LISPRO 5 UNITS: 100 INJECTION, SOLUTION INTRAVENOUS; SUBCUTANEOUS at 12:10

## 2019-11-10 RX ADMIN — FENTANYL CITRATE 50 MCG: 50 INJECTION INTRAMUSCULAR; INTRAVENOUS at 09:23

## 2019-11-10 RX ADMIN — FENTANYL CITRATE 50 MCG: 50 INJECTION INTRAMUSCULAR; INTRAVENOUS at 09:03

## 2019-11-10 RX ADMIN — FENTANYL CITRATE 50 MCG: 50 INJECTION INTRAMUSCULAR; INTRAVENOUS at 09:50

## 2019-11-10 RX ADMIN — Medication 1.5 G: at 20:30

## 2019-11-10 RX ADMIN — DOCUSATE SODIUM 100 MG: 100 CAPSULE, LIQUID FILLED ORAL at 20:30

## 2019-11-10 RX ADMIN — OXYCODONE HYDROCHLORIDE 5 MG: 5 TABLET ORAL at 18:17

## 2019-11-10 RX ADMIN — INSULIN LISPRO 5 UNITS: 100 INJECTION, SOLUTION INTRAVENOUS; SUBCUTANEOUS at 17:01

## 2019-11-10 RX ADMIN — INSULIN LISPRO 15 UNITS: 100 INJECTION, SOLUTION INTRAVENOUS; SUBCUTANEOUS at 17:01

## 2019-11-10 RX ADMIN — Medication 10 ML: at 20:29

## 2019-11-10 ASSESSMENT — PULMONARY FUNCTION TESTS
PIF_VALUE: 28
PIF_VALUE: 19
PIF_VALUE: 2
PIF_VALUE: 19
PIF_VALUE: 0
PIF_VALUE: 20
PIF_VALUE: 0
PIF_VALUE: 9
PIF_VALUE: 19
PIF_VALUE: 5
PIF_VALUE: 20
PIF_VALUE: 20
PIF_VALUE: 19
PIF_VALUE: 7
PIF_VALUE: 20
PIF_VALUE: 18
PIF_VALUE: 0
PIF_VALUE: 3
PIF_VALUE: 19
PIF_VALUE: 19
PIF_VALUE: 22
PIF_VALUE: 18
PIF_VALUE: 0
PIF_VALUE: 19
PIF_VALUE: 29
PIF_VALUE: 7
PIF_VALUE: 3
PIF_VALUE: 21
PIF_VALUE: 20
PIF_VALUE: 24
PIF_VALUE: 20
PIF_VALUE: 17
PIF_VALUE: 19
PIF_VALUE: 19
PIF_VALUE: 0
PIF_VALUE: 18
PIF_VALUE: 8
PIF_VALUE: 0
PIF_VALUE: 19
PIF_VALUE: 20
PIF_VALUE: 29
PIF_VALUE: 8
PIF_VALUE: 20
PIF_VALUE: 18
PIF_VALUE: 5
PIF_VALUE: 19
PIF_VALUE: 17
PIF_VALUE: 22
PIF_VALUE: 22
PIF_VALUE: 8
PIF_VALUE: 0
PIF_VALUE: 22
PIF_VALUE: 8
PIF_VALUE: 22
PIF_VALUE: 19
PIF_VALUE: 19
PIF_VALUE: 18
PIF_VALUE: 30
PIF_VALUE: 3
PIF_VALUE: 21
PIF_VALUE: 20
PIF_VALUE: 10
PIF_VALUE: 18
PIF_VALUE: 21
PIF_VALUE: 19
PIF_VALUE: 21
PIF_VALUE: 3
PIF_VALUE: 9
PIF_VALUE: 8
PIF_VALUE: 12
PIF_VALUE: 20
PIF_VALUE: 7
PIF_VALUE: 8
PIF_VALUE: 23
PIF_VALUE: 8
PIF_VALUE: 19
PIF_VALUE: 19
PIF_VALUE: 20
PIF_VALUE: 19
PIF_VALUE: 7
PIF_VALUE: 1
PIF_VALUE: 19
PIF_VALUE: 18
PIF_VALUE: 9
PIF_VALUE: 6
PIF_VALUE: 19
PIF_VALUE: 3
PIF_VALUE: 30
PIF_VALUE: 8
PIF_VALUE: 7
PIF_VALUE: 8
PIF_VALUE: 18
PIF_VALUE: 8
PIF_VALUE: 8
PIF_VALUE: 22
PIF_VALUE: 18
PIF_VALUE: 3
PIF_VALUE: 1
PIF_VALUE: 8
PIF_VALUE: 19
PIF_VALUE: 20
PIF_VALUE: 0
PIF_VALUE: 7
PIF_VALUE: 15
PIF_VALUE: 8
PIF_VALUE: 20
PIF_VALUE: 28
PIF_VALUE: 8
PIF_VALUE: 19
PIF_VALUE: 3
PIF_VALUE: 18

## 2019-11-10 ASSESSMENT — PAIN DESCRIPTION - ORIENTATION
ORIENTATION: RIGHT

## 2019-11-10 ASSESSMENT — PAIN SCALES - GENERAL
PAINLEVEL_OUTOF10: 8
PAINLEVEL_OUTOF10: 7
PAINLEVEL_OUTOF10: 9
PAINLEVEL_OUTOF10: 9
PAINLEVEL_OUTOF10: 6
PAINLEVEL_OUTOF10: 7
PAINLEVEL_OUTOF10: 6
PAINLEVEL_OUTOF10: 10
PAINLEVEL_OUTOF10: 9

## 2019-11-10 ASSESSMENT — PAIN DESCRIPTION - ONSET
ONSET: ON-GOING
ONSET: ON-GOING

## 2019-11-10 ASSESSMENT — PAIN DESCRIPTION - FREQUENCY
FREQUENCY: INTERMITTENT
FREQUENCY: CONTINUOUS
FREQUENCY: INTERMITTENT
FREQUENCY: CONTINUOUS
FREQUENCY: INTERMITTENT
FREQUENCY: INTERMITTENT

## 2019-11-10 ASSESSMENT — PAIN DESCRIPTION - LOCATION
LOCATION: ANKLE

## 2019-11-10 ASSESSMENT — PAIN DESCRIPTION - DESCRIPTORS
DESCRIPTORS: SHARP
DESCRIPTORS: SORE;SHARP
DESCRIPTORS: BURNING
DESCRIPTORS: SORE
DESCRIPTORS: SHARP;BURNING
DESCRIPTORS: SORE

## 2019-11-10 ASSESSMENT — LIFESTYLE VARIABLES: SMOKING_STATUS: 1

## 2019-11-10 ASSESSMENT — ENCOUNTER SYMPTOMS: SHORTNESS OF BREATH: 0

## 2019-11-10 ASSESSMENT — PAIN DESCRIPTION - PROGRESSION
CLINICAL_PROGRESSION: NOT CHANGED
CLINICAL_PROGRESSION: NOT CHANGED

## 2019-11-10 ASSESSMENT — PAIN DESCRIPTION - PAIN TYPE
TYPE: ACUTE PAIN
TYPE: SURGICAL PAIN
TYPE: SURGICAL PAIN
TYPE: ACUTE PAIN;SURGICAL PAIN
TYPE: SURGICAL PAIN
TYPE: SURGICAL PAIN

## 2019-11-11 DIAGNOSIS — R55 SYNCOPE AND COLLAPSE: Primary | ICD-10-CM

## 2019-11-11 LAB
ANION GAP SERPL CALCULATED.3IONS-SCNC: 7 MMOL/L (ref 3–16)
BASOPHILS ABSOLUTE: 0 K/UL (ref 0–0.2)
BASOPHILS RELATIVE PERCENT: 0.7 %
BUN BLDV-MCNC: 12 MG/DL (ref 7–20)
CALCIUM SERPL-MCNC: 7.7 MG/DL (ref 8.3–10.6)
CHLORIDE BLD-SCNC: 100 MMOL/L (ref 99–110)
CO2: 24 MMOL/L (ref 21–32)
CREAT SERPL-MCNC: 0.8 MG/DL (ref 0.8–1.3)
EOSINOPHILS ABSOLUTE: 0.2 K/UL (ref 0–0.6)
EOSINOPHILS RELATIVE PERCENT: 3 %
GFR AFRICAN AMERICAN: >60
GFR NON-AFRICAN AMERICAN: >60
GLUCOSE BLD-MCNC: 108 MG/DL (ref 70–99)
GLUCOSE BLD-MCNC: 138 MG/DL (ref 70–99)
GLUCOSE BLD-MCNC: 213 MG/DL (ref 70–99)
GLUCOSE BLD-MCNC: 237 MG/DL (ref 70–99)
GLUCOSE BLD-MCNC: 248 MG/DL (ref 70–99)
HCT VFR BLD CALC: 32.1 % (ref 40.5–52.5)
HEMOGLOBIN: 11 G/DL (ref 13.5–17.5)
LYMPHOCYTES ABSOLUTE: 1.3 K/UL (ref 1–5.1)
LYMPHOCYTES RELATIVE PERCENT: 18.8 %
MCH RBC QN AUTO: 30 PG (ref 26–34)
MCHC RBC AUTO-ENTMCNC: 34.2 G/DL (ref 31–36)
MCV RBC AUTO: 87.7 FL (ref 80–100)
MONOCYTES ABSOLUTE: 0.8 K/UL (ref 0–1.3)
MONOCYTES RELATIVE PERCENT: 11.7 %
NEUTROPHILS ABSOLUTE: 4.5 K/UL (ref 1.7–7.7)
NEUTROPHILS RELATIVE PERCENT: 65.8 %
PDW BLD-RTO: 14.8 % (ref 12.4–15.4)
PERFORMED ON: ABNORMAL
PLATELET # BLD: 146 K/UL (ref 135–450)
PMV BLD AUTO: 9.4 FL (ref 5–10.5)
POTASSIUM REFLEX MAGNESIUM: 4.3 MMOL/L (ref 3.5–5.1)
RBC # BLD: 3.66 M/UL (ref 4.2–5.9)
SODIUM BLD-SCNC: 131 MMOL/L (ref 136–145)
WBC # BLD: 6.9 K/UL (ref 4–11)

## 2019-11-11 PROCEDURE — 97535 SELF CARE MNGMENT TRAINING: CPT

## 2019-11-11 PROCEDURE — 85025 COMPLETE CBC W/AUTO DIFF WBC: CPT

## 2019-11-11 PROCEDURE — 97162 PT EVAL MOD COMPLEX 30 MIN: CPT

## 2019-11-11 PROCEDURE — 36415 COLL VENOUS BLD VENIPUNCTURE: CPT

## 2019-11-11 PROCEDURE — 93971 EXTREMITY STUDY: CPT

## 2019-11-11 PROCEDURE — 6370000000 HC RX 637 (ALT 250 FOR IP): Performed by: ORTHOPAEDIC SURGERY

## 2019-11-11 PROCEDURE — 80048 BASIC METABOLIC PNL TOTAL CA: CPT

## 2019-11-11 PROCEDURE — 99232 SBSQ HOSP IP/OBS MODERATE 35: CPT | Performed by: INTERNAL MEDICINE

## 2019-11-11 PROCEDURE — 97166 OT EVAL MOD COMPLEX 45 MIN: CPT

## 2019-11-11 PROCEDURE — 2060000000 HC ICU INTERMEDIATE R&B

## 2019-11-11 PROCEDURE — 97530 THERAPEUTIC ACTIVITIES: CPT

## 2019-11-11 PROCEDURE — 6360000002 HC RX W HCPCS: Performed by: ORTHOPAEDIC SURGERY

## 2019-11-11 PROCEDURE — 2580000003 HC RX 258: Performed by: ORTHOPAEDIC SURGERY

## 2019-11-11 PROCEDURE — 99222 1ST HOSP IP/OBS MODERATE 55: CPT | Performed by: INTERNAL MEDICINE

## 2019-11-11 PROCEDURE — 6370000000 HC RX 637 (ALT 250 FOR IP): Performed by: INTERNAL MEDICINE

## 2019-11-11 RX ORDER — MELOXICAM 15 MG/1
15 TABLET ORAL DAILY
Status: DISCONTINUED | OUTPATIENT
Start: 2019-11-11 | End: 2019-11-12 | Stop reason: HOSPADM

## 2019-11-11 RX ORDER — MELOXICAM 15 MG/1
15 TABLET ORAL DAILY
Qty: 30 TABLET | Refills: 0 | Status: ON HOLD | OUTPATIENT
Start: 2019-11-11 | End: 2020-01-16 | Stop reason: HOSPADM

## 2019-11-11 RX ORDER — OXYCODONE HYDROCHLORIDE 5 MG/1
5 TABLET ORAL EVERY 6 HOURS PRN
Qty: 20 TABLET | Refills: 0 | Status: SHIPPED | OUTPATIENT
Start: 2019-11-11 | End: 2019-11-18 | Stop reason: SDUPTHER

## 2019-11-11 RX ADMIN — MORPHINE SULFATE 2 MG: 2 INJECTION, SOLUTION INTRAMUSCULAR; INTRAVENOUS at 09:26

## 2019-11-11 RX ADMIN — INSULIN LISPRO 6 UNITS: 100 INJECTION, SOLUTION INTRAVENOUS; SUBCUTANEOUS at 13:10

## 2019-11-11 RX ADMIN — MORPHINE SULFATE 2 MG: 2 INJECTION, SOLUTION INTRAMUSCULAR; INTRAVENOUS at 05:11

## 2019-11-11 RX ADMIN — SIMVASTATIN 10 MG: 10 TABLET, FILM COATED ORAL at 20:15

## 2019-11-11 RX ADMIN — INSULIN LISPRO 5 UNITS: 100 INJECTION, SOLUTION INTRAVENOUS; SUBCUTANEOUS at 13:10

## 2019-11-11 RX ADMIN — Medication 10 ML: at 08:51

## 2019-11-11 RX ADMIN — OXYCODONE HYDROCHLORIDE 5 MG: 5 TABLET ORAL at 06:50

## 2019-11-11 RX ADMIN — SODIUM CHLORIDE: 9 INJECTION, SOLUTION INTRAVENOUS at 05:11

## 2019-11-11 RX ADMIN — GABAPENTIN 800 MG: 400 CAPSULE ORAL at 08:51

## 2019-11-11 RX ADMIN — ASPIRIN 81 MG 81 MG: 81 TABLET ORAL at 08:51

## 2019-11-11 RX ADMIN — Medication 10 ML: at 20:16

## 2019-11-11 RX ADMIN — OXYCODONE HYDROCHLORIDE 5 MG: 5 TABLET ORAL at 00:30

## 2019-11-11 RX ADMIN — DOXAZOSIN 1 MG: 2 TABLET ORAL at 08:51

## 2019-11-11 RX ADMIN — Medication 10 ML: at 09:26

## 2019-11-11 RX ADMIN — INSULIN LISPRO 3 UNITS: 100 INJECTION, SOLUTION INTRAVENOUS; SUBCUTANEOUS at 20:31

## 2019-11-11 RX ADMIN — MELOXICAM 15 MG: 15 TABLET ORAL at 10:26

## 2019-11-11 RX ADMIN — GABAPENTIN 800 MG: 400 CAPSULE ORAL at 20:15

## 2019-11-11 RX ADMIN — DOCUSATE SODIUM 100 MG: 100 CAPSULE, LIQUID FILLED ORAL at 08:51

## 2019-11-11 RX ADMIN — INSULIN GLARGINE 25 UNITS: 100 INJECTION, SOLUTION SUBCUTANEOUS at 20:32

## 2019-11-11 RX ADMIN — DOCUSATE SODIUM 100 MG: 100 CAPSULE, LIQUID FILLED ORAL at 20:15

## 2019-11-11 RX ADMIN — OXYCODONE HYDROCHLORIDE 5 MG: 5 TABLET ORAL at 13:43

## 2019-11-11 RX ADMIN — OXYCODONE HYDROCHLORIDE 5 MG: 5 TABLET ORAL at 20:15

## 2019-11-11 RX ADMIN — ENOXAPARIN SODIUM 40 MG: 40 INJECTION SUBCUTANEOUS at 08:51

## 2019-11-11 RX ADMIN — GABAPENTIN 800 MG: 400 CAPSULE ORAL at 13:43

## 2019-11-11 ASSESSMENT — PAIN DESCRIPTION - ONSET
ONSET: ON-GOING

## 2019-11-11 ASSESSMENT — PAIN DESCRIPTION - ORIENTATION
ORIENTATION: RIGHT

## 2019-11-11 ASSESSMENT — PAIN SCALES - GENERAL
PAINLEVEL_OUTOF10: 8
PAINLEVEL_OUTOF10: 8
PAINLEVEL_OUTOF10: 6
PAINLEVEL_OUTOF10: 10
PAINLEVEL_OUTOF10: 6
PAINLEVEL_OUTOF10: 5
PAINLEVEL_OUTOF10: 10

## 2019-11-11 ASSESSMENT — PAIN DESCRIPTION - PAIN TYPE
TYPE: ACUTE PAIN;SURGICAL PAIN

## 2019-11-11 ASSESSMENT — PAIN DESCRIPTION - DESCRIPTORS
DESCRIPTORS: CONSTANT;DISCOMFORT
DESCRIPTORS: BURNING
DESCRIPTORS: BURNING

## 2019-11-11 ASSESSMENT — PAIN DESCRIPTION - LOCATION
LOCATION: ANKLE

## 2019-11-11 ASSESSMENT — PAIN DESCRIPTION - PROGRESSION
CLINICAL_PROGRESSION: NOT CHANGED
CLINICAL_PROGRESSION: GRADUALLY WORSENING
CLINICAL_PROGRESSION: NOT CHANGED

## 2019-11-11 ASSESSMENT — PAIN DESCRIPTION - FREQUENCY
FREQUENCY: CONTINUOUS

## 2019-11-12 VITALS
SYSTOLIC BLOOD PRESSURE: 142 MMHG | HEART RATE: 88 BPM | BODY MASS INDEX: 25.9 KG/M2 | DIASTOLIC BLOOD PRESSURE: 72 MMHG | OXYGEN SATURATION: 96 % | HEIGHT: 73 IN | TEMPERATURE: 97.3 F | RESPIRATION RATE: 17 BRPM | WEIGHT: 195.4 LBS

## 2019-11-12 LAB
GLUCOSE BLD-MCNC: 249 MG/DL (ref 70–99)
GLUCOSE BLD-MCNC: 301 MG/DL (ref 70–99)
PERFORMED ON: ABNORMAL
PERFORMED ON: ABNORMAL

## 2019-11-12 PROCEDURE — 99232 SBSQ HOSP IP/OBS MODERATE 35: CPT | Performed by: NURSE PRACTITIONER

## 2019-11-12 PROCEDURE — 97535 SELF CARE MNGMENT TRAINING: CPT

## 2019-11-12 PROCEDURE — 6370000000 HC RX 637 (ALT 250 FOR IP): Performed by: ORTHOPAEDIC SURGERY

## 2019-11-12 PROCEDURE — 97530 THERAPEUTIC ACTIVITIES: CPT

## 2019-11-12 PROCEDURE — 6360000002 HC RX W HCPCS: Performed by: ORTHOPAEDIC SURGERY

## 2019-11-12 PROCEDURE — 97110 THERAPEUTIC EXERCISES: CPT

## 2019-11-12 PROCEDURE — 97116 GAIT TRAINING THERAPY: CPT

## 2019-11-12 PROCEDURE — 6370000000 HC RX 637 (ALT 250 FOR IP): Performed by: INTERNAL MEDICINE

## 2019-11-12 PROCEDURE — 2580000003 HC RX 258: Performed by: ORTHOPAEDIC SURGERY

## 2019-11-12 PROCEDURE — 99239 HOSP IP/OBS DSCHRG MGMT >30: CPT | Performed by: INTERNAL MEDICINE

## 2019-11-12 RX ADMIN — DOCUSATE SODIUM 100 MG: 100 CAPSULE, LIQUID FILLED ORAL at 08:24

## 2019-11-12 RX ADMIN — Medication 10 ML: at 08:24

## 2019-11-12 RX ADMIN — DOXAZOSIN 1 MG: 2 TABLET ORAL at 08:23

## 2019-11-12 RX ADMIN — OXYCODONE HYDROCHLORIDE 5 MG: 5 TABLET ORAL at 08:24

## 2019-11-12 RX ADMIN — ENOXAPARIN SODIUM 40 MG: 40 INJECTION SUBCUTANEOUS at 08:23

## 2019-11-12 RX ADMIN — GABAPENTIN 800 MG: 400 CAPSULE ORAL at 08:23

## 2019-11-12 RX ADMIN — ASPIRIN 81 MG 81 MG: 81 TABLET ORAL at 08:24

## 2019-11-12 RX ADMIN — MELOXICAM 15 MG: 15 TABLET ORAL at 08:23

## 2019-11-12 RX ADMIN — OXYCODONE HYDROCHLORIDE 5 MG: 5 TABLET ORAL at 02:28

## 2019-11-12 ASSESSMENT — ENCOUNTER SYMPTOMS
SHORTNESS OF BREATH: 0
CHEST TIGHTNESS: 0

## 2019-11-12 ASSESSMENT — PAIN SCALES - GENERAL
PAINLEVEL_OUTOF10: 6
PAINLEVEL_OUTOF10: 8
PAINLEVEL_OUTOF10: 0

## 2019-11-18 ENCOUNTER — OFFICE VISIT (OUTPATIENT)
Dept: ORTHOPEDIC SURGERY | Age: 64
End: 2019-11-18
Payer: OTHER GOVERNMENT

## 2019-11-18 VITALS — HEIGHT: 73 IN | BODY MASS INDEX: 25.89 KG/M2 | WEIGHT: 195.33 LBS

## 2019-11-18 DIAGNOSIS — S82.891A CLOSED FRACTURE OF RIGHT ANKLE, INITIAL ENCOUNTER: ICD-10-CM

## 2019-11-18 DIAGNOSIS — M25.571 RIGHT ANKLE PAIN, UNSPECIFIED CHRONICITY: ICD-10-CM

## 2019-11-18 DIAGNOSIS — S82.891D CLOSED FRACTURE OF RIGHT ANKLE WITH ROUTINE HEALING, SUBSEQUENT ENCOUNTER: Primary | ICD-10-CM

## 2019-11-18 PROCEDURE — E0114 CRUTCH UNDERARM PAIR NO WOOD: HCPCS | Performed by: ORTHOPAEDIC SURGERY

## 2019-11-18 PROCEDURE — 29405 APPL SHORT LEG CAST: CPT | Performed by: ORTHOPAEDIC SURGERY

## 2019-11-18 PROCEDURE — 99024 POSTOP FOLLOW-UP VISIT: CPT | Performed by: ORTHOPAEDIC SURGERY

## 2019-11-18 RX ORDER — OXYCODONE HYDROCHLORIDE 5 MG/1
5 TABLET ORAL EVERY 6 HOURS PRN
Qty: 28 TABLET | Refills: 0 | Status: SHIPPED | OUTPATIENT
Start: 2019-11-18 | End: 2019-11-25

## 2019-11-25 DIAGNOSIS — S82.891A CLOSED FRACTURE OF RIGHT ANKLE, INITIAL ENCOUNTER: Primary | ICD-10-CM

## 2019-11-25 RX ORDER — HYDROCODONE BITARTRATE AND ACETAMINOPHEN 5; 325 MG/1; MG/1
1 TABLET ORAL EVERY 6 HOURS PRN
Qty: 28 TABLET | Refills: 0 | Status: SHIPPED | OUTPATIENT
Start: 2019-11-25 | End: 2019-12-02

## 2019-12-03 ENCOUNTER — OFFICE VISIT (OUTPATIENT)
Dept: ORTHOPEDIC SURGERY | Age: 64
End: 2019-12-03

## 2019-12-03 ENCOUNTER — TELEPHONE (OUTPATIENT)
Dept: ORTHOPEDIC SURGERY | Age: 64
End: 2019-12-03

## 2019-12-03 VITALS — BODY MASS INDEX: 25.89 KG/M2 | HEIGHT: 73 IN | WEIGHT: 195.33 LBS

## 2019-12-03 DIAGNOSIS — S82.891D CLOSED FRACTURE OF RIGHT ANKLE WITH ROUTINE HEALING, SUBSEQUENT ENCOUNTER: Primary | ICD-10-CM

## 2019-12-03 PROCEDURE — 99024 POSTOP FOLLOW-UP VISIT: CPT | Performed by: ORTHOPAEDIC SURGERY

## 2019-12-03 RX ORDER — TRAMADOL HYDROCHLORIDE 50 MG/1
50 TABLET ORAL ONCE
Qty: 7 TABLET | Refills: 0 | Status: CANCELLED | OUTPATIENT
Start: 2019-12-03 | End: 2019-12-03

## 2019-12-03 RX ORDER — TRAMADOL HYDROCHLORIDE 50 MG/1
50 TABLET ORAL EVERY 6 HOURS PRN
Qty: 28 TABLET | Refills: 0 | Status: SHIPPED | OUTPATIENT
Start: 2019-12-03 | End: 2019-12-10

## 2019-12-07 ENCOUNTER — APPOINTMENT (OUTPATIENT)
Dept: GENERAL RADIOLOGY | Age: 64
End: 2019-12-07
Payer: OTHER GOVERNMENT

## 2019-12-07 ENCOUNTER — HOSPITAL ENCOUNTER (EMERGENCY)
Age: 64
Discharge: HOME | End: 2019-12-07
Attending: EMERGENCY MEDICINE
Payer: OTHER GOVERNMENT

## 2019-12-07 VITALS
SYSTOLIC BLOOD PRESSURE: 144 MMHG | RESPIRATION RATE: 16 BRPM | BODY MASS INDEX: 23.06 KG/M2 | TEMPERATURE: 98.5 F | HEART RATE: 89 BPM | OXYGEN SATURATION: 100 % | DIASTOLIC BLOOD PRESSURE: 89 MMHG | HEIGHT: 73 IN | WEIGHT: 174 LBS

## 2019-12-07 DIAGNOSIS — F17.200 SMOKER: ICD-10-CM

## 2019-12-07 DIAGNOSIS — E11.9 TYPE 2 DIABETES MELLITUS WITHOUT COMPLICATION, WITH LONG-TERM CURRENT USE OF INSULIN (HCC): ICD-10-CM

## 2019-12-07 DIAGNOSIS — F42.4 SKIN PICKING HABIT: ICD-10-CM

## 2019-12-07 DIAGNOSIS — M25.571 ACUTE RIGHT ANKLE PAIN: ICD-10-CM

## 2019-12-07 DIAGNOSIS — I10 ESSENTIAL HYPERTENSION: ICD-10-CM

## 2019-12-07 DIAGNOSIS — Z98.890 HISTORY OF ANKLE SURGERY: Primary | ICD-10-CM

## 2019-12-07 DIAGNOSIS — L03.115 CELLULITIS OF RIGHT LOWER EXTREMITY: ICD-10-CM

## 2019-12-07 DIAGNOSIS — Z79.4 TYPE 2 DIABETES MELLITUS WITHOUT COMPLICATION, WITH LONG-TERM CURRENT USE OF INSULIN (HCC): ICD-10-CM

## 2019-12-07 PROCEDURE — 99283 EMERGENCY DEPT VISIT LOW MDM: CPT

## 2019-12-07 PROCEDURE — 73610 X-RAY EXAM OF ANKLE: CPT

## 2019-12-07 PROCEDURE — 6370000000 HC RX 637 (ALT 250 FOR IP): Performed by: EMERGENCY MEDICINE

## 2019-12-07 RX ORDER — HYDROCODONE BITARTRATE AND ACETAMINOPHEN 5; 325 MG/1; MG/1
1 TABLET ORAL ONCE
Status: COMPLETED | OUTPATIENT
Start: 2019-12-07 | End: 2019-12-07

## 2019-12-07 RX ORDER — DOXYCYCLINE HYCLATE 100 MG
100 TABLET ORAL 2 TIMES DAILY
Qty: 14 TABLET | Refills: 0 | Status: SHIPPED | OUTPATIENT
Start: 2019-12-07 | End: 2019-12-14

## 2019-12-07 RX ORDER — DOXYCYCLINE HYCLATE 100 MG
100 TABLET ORAL ONCE
Status: COMPLETED | OUTPATIENT
Start: 2019-12-07 | End: 2019-12-07

## 2019-12-07 RX ORDER — IBUPROFEN 800 MG/1
800 TABLET ORAL EVERY 8 HOURS PRN
Qty: 21 TABLET | Refills: 0 | Status: ON HOLD | OUTPATIENT
Start: 2019-12-07 | End: 2020-01-16 | Stop reason: HOSPADM

## 2019-12-07 RX ORDER — HYDROCODONE BITARTRATE AND ACETAMINOPHEN 5; 325 MG/1; MG/1
1 TABLET ORAL EVERY 6 HOURS PRN
Qty: 8 TABLET | Refills: 0 | Status: SHIPPED | OUTPATIENT
Start: 2019-12-07 | End: 2019-12-10

## 2019-12-07 RX ORDER — MULTIVIT-MIN/IRON/FOLIC ACID/K 18-600-40
1 CAPSULE ORAL DAILY
COMMUNITY

## 2019-12-07 RX ADMIN — HYDROCODONE BITARTRATE AND ACETAMINOPHEN 1 TABLET: 5; 325 TABLET ORAL at 03:41

## 2019-12-07 RX ADMIN — DOXYCYCLINE HYCLATE 100 MG: 100 TABLET, COATED ORAL at 04:57

## 2019-12-07 ASSESSMENT — PAIN SCALES - GENERAL
PAINLEVEL_OUTOF10: 10
PAINLEVEL_OUTOF10: 10

## 2019-12-07 ASSESSMENT — PAIN DESCRIPTION - ORIENTATION: ORIENTATION: RIGHT

## 2019-12-07 ASSESSMENT — PAIN DESCRIPTION - PAIN TYPE: TYPE: ACUTE PAIN

## 2019-12-07 ASSESSMENT — PAIN DESCRIPTION - LOCATION: LOCATION: ANKLE

## 2019-12-12 ENCOUNTER — TELEPHONE (OUTPATIENT)
Dept: ORTHOPEDIC SURGERY | Age: 64
End: 2019-12-12

## 2020-01-08 ENCOUNTER — APPOINTMENT (OUTPATIENT)
Dept: GENERAL RADIOLOGY | Age: 65
End: 2020-01-08
Payer: OTHER GOVERNMENT

## 2020-01-08 ENCOUNTER — HOSPITAL ENCOUNTER (EMERGENCY)
Age: 65
Discharge: HOME | End: 2020-01-09
Attending: EMERGENCY MEDICINE
Payer: OTHER GOVERNMENT

## 2020-01-08 LAB
A/G RATIO: 1 (ref 1.1–2.2)
ALBUMIN SERPL-MCNC: 4 G/DL (ref 3.4–5)
ALP BLD-CCNC: 151 U/L (ref 40–129)
ALT SERPL-CCNC: 81 U/L (ref 10–40)
ANION GAP SERPL CALCULATED.3IONS-SCNC: 9 MMOL/L (ref 3–16)
AST SERPL-CCNC: 40 U/L (ref 15–37)
BASOPHILS ABSOLUTE: 0.1 K/UL (ref 0–0.2)
BASOPHILS RELATIVE PERCENT: 0.8 %
BILIRUB SERPL-MCNC: 0.6 MG/DL (ref 0–1)
BUN BLDV-MCNC: 9 MG/DL (ref 7–20)
CALCIUM SERPL-MCNC: 9.2 MG/DL (ref 8.3–10.6)
CHLORIDE BLD-SCNC: 87 MMOL/L (ref 99–110)
CO2: 30 MMOL/L (ref 21–32)
CREAT SERPL-MCNC: 0.8 MG/DL (ref 0.8–1.3)
EOSINOPHILS ABSOLUTE: 0.2 K/UL (ref 0–0.6)
EOSINOPHILS RELATIVE PERCENT: 1.7 %
GFR AFRICAN AMERICAN: >60
GFR NON-AFRICAN AMERICAN: >60
GLOBULIN: 4.1 G/DL
GLUCOSE BLD-MCNC: 724 MG/DL (ref 70–99)
HCT VFR BLD CALC: 44.5 % (ref 40.5–52.5)
HEMOGLOBIN: 14.8 G/DL (ref 13.5–17.5)
LACTIC ACID, SEPSIS: 1.4 MMOL/L (ref 0.4–1.9)
LYMPHOCYTES ABSOLUTE: 1.1 K/UL (ref 1–5.1)
LYMPHOCYTES RELATIVE PERCENT: 10.2 %
MCH RBC QN AUTO: 29.6 PG (ref 26–34)
MCHC RBC AUTO-ENTMCNC: 33.2 G/DL (ref 31–36)
MCV RBC AUTO: 89.1 FL (ref 80–100)
MONOCYTES ABSOLUTE: 0.8 K/UL (ref 0–1.3)
MONOCYTES RELATIVE PERCENT: 7.2 %
NEUTROPHILS ABSOLUTE: 8.5 K/UL (ref 1.7–7.7)
NEUTROPHILS RELATIVE PERCENT: 80.1 %
PDW BLD-RTO: 13.7 % (ref 12.4–15.4)
PLATELET # BLD: 248 K/UL (ref 135–450)
PMV BLD AUTO: 9.6 FL (ref 5–10.5)
POTASSIUM REFLEX MAGNESIUM: 4.1 MMOL/L (ref 3.5–5.1)
RBC # BLD: 5 M/UL (ref 4.2–5.9)
SEDIMENTATION RATE, ERYTHROCYTE: 31 MM/HR (ref 0–20)
SODIUM BLD-SCNC: 126 MMOL/L (ref 136–145)
TOTAL PROTEIN: 8.1 G/DL (ref 6.4–8.2)
WBC # BLD: 10.7 K/UL (ref 4–11)

## 2020-01-08 PROCEDURE — 73630 X-RAY EXAM OF FOOT: CPT

## 2020-01-08 PROCEDURE — 85025 COMPLETE CBC W/AUTO DIFF WBC: CPT

## 2020-01-08 PROCEDURE — 96367 TX/PROPH/DG ADDL SEQ IV INF: CPT

## 2020-01-08 PROCEDURE — 80053 COMPREHEN METABOLIC PANEL: CPT

## 2020-01-08 PROCEDURE — 2580000003 HC RX 258: Performed by: EMERGENCY MEDICINE

## 2020-01-08 PROCEDURE — 96365 THER/PROPH/DIAG IV INF INIT: CPT

## 2020-01-08 PROCEDURE — 87040 BLOOD CULTURE FOR BACTERIA: CPT

## 2020-01-08 PROCEDURE — 85652 RBC SED RATE AUTOMATED: CPT

## 2020-01-08 PROCEDURE — 6360000002 HC RX W HCPCS: Performed by: EMERGENCY MEDICINE

## 2020-01-08 PROCEDURE — 83605 ASSAY OF LACTIC ACID: CPT

## 2020-01-08 PROCEDURE — 73610 X-RAY EXAM OF ANKLE: CPT

## 2020-01-08 PROCEDURE — 99283 EMERGENCY DEPT VISIT LOW MDM: CPT

## 2020-01-08 PROCEDURE — 96366 THER/PROPH/DIAG IV INF ADDON: CPT

## 2020-01-08 PROCEDURE — 96375 TX/PRO/DX INJ NEW DRUG ADDON: CPT

## 2020-01-08 PROCEDURE — 90715 TDAP VACCINE 7 YRS/> IM: CPT | Performed by: EMERGENCY MEDICINE

## 2020-01-08 PROCEDURE — 36415 COLL VENOUS BLD VENIPUNCTURE: CPT

## 2020-01-08 PROCEDURE — 90471 IMMUNIZATION ADMIN: CPT | Performed by: EMERGENCY MEDICINE

## 2020-01-08 PROCEDURE — 86140 C-REACTIVE PROTEIN: CPT

## 2020-01-08 RX ORDER — KETOROLAC TROMETHAMINE 30 MG/ML
15 INJECTION, SOLUTION INTRAMUSCULAR; INTRAVENOUS ONCE
Status: COMPLETED | OUTPATIENT
Start: 2020-01-08 | End: 2020-01-08

## 2020-01-08 RX ORDER — 0.9 % SODIUM CHLORIDE 0.9 %
1000 INTRAVENOUS SOLUTION INTRAVENOUS ONCE
Status: COMPLETED | OUTPATIENT
Start: 2020-01-08 | End: 2020-01-09

## 2020-01-08 RX ADMIN — PIPERACILLIN AND TAZOBACTAM 4.5 G: 4; .5 INJECTION, POWDER, LYOPHILIZED, FOR SOLUTION INTRAVENOUS; PARENTERAL at 21:46

## 2020-01-08 RX ADMIN — SODIUM CHLORIDE 1000 ML: 9 INJECTION, SOLUTION INTRAVENOUS at 23:05

## 2020-01-08 RX ADMIN — KETOROLAC TROMETHAMINE 15 MG: 30 INJECTION, SOLUTION INTRAMUSCULAR at 23:06

## 2020-01-08 RX ADMIN — TETANUS TOXOID, REDUCED DIPHTHERIA TOXOID AND ACELLULAR PERTUSSIS VACCINE, ADSORBED 0.5 ML: 5; 2.5; 8; 8; 2.5 SUSPENSION INTRAMUSCULAR at 21:46

## 2020-01-08 RX ADMIN — VANCOMYCIN HYDROCHLORIDE 2000 MG: 1 INJECTION, POWDER, LYOPHILIZED, FOR SOLUTION INTRAVENOUS at 22:23

## 2020-01-08 ASSESSMENT — ENCOUNTER SYMPTOMS
SORE THROAT: 0
BACK PAIN: 0
SHORTNESS OF BREATH: 0
COUGH: 0
ABDOMINAL PAIN: 0
DIARRHEA: 0
VOMITING: 0
CONSTIPATION: 0
NAUSEA: 0

## 2020-01-08 ASSESSMENT — PAIN SCALES - GENERAL
PAINLEVEL_OUTOF10: 2
PAINLEVEL_OUTOF10: 5

## 2020-01-08 ASSESSMENT — PAIN DESCRIPTION - PAIN TYPE: TYPE: ACUTE PAIN

## 2020-01-08 ASSESSMENT — PAIN DESCRIPTION - LOCATION: LOCATION: HEAD

## 2020-01-09 VITALS
HEART RATE: 87 BPM | RESPIRATION RATE: 14 BRPM | OXYGEN SATURATION: 100 % | DIASTOLIC BLOOD PRESSURE: 83 MMHG | SYSTOLIC BLOOD PRESSURE: 145 MMHG | TEMPERATURE: 98.4 F | WEIGHT: 174 LBS | BODY MASS INDEX: 23.06 KG/M2 | HEIGHT: 73 IN

## 2020-01-09 LAB — C-REACTIVE PROTEIN: 38.6 MG/L (ref 0–5.1)

## 2020-01-09 RX ORDER — CLINDAMYCIN HYDROCHLORIDE 150 MG/1
450 CAPSULE ORAL 3 TIMES DAILY
Qty: 90 CAPSULE | Refills: 0 | Status: ON HOLD | OUTPATIENT
Start: 2020-01-09 | End: 2020-01-16 | Stop reason: HOSPADM

## 2020-01-09 NOTE — ED PROVIDER NOTES
of the review of systems was reviewed and negative. PAST MEDICAL HISTORY     Past Medical History:   Diagnosis Date    Arthritis     Diabetes mellitus ([facility] [address]     Hx of blood clots     Hyperlipidemia     Hypertension          SURGICAL HISTORY       Past Surgical History:   Procedure Laterality Date    ANKLE FRACTURE SURGERY Right 11/10/2019    ORIF right ankle    ANKLE FRACTURE SURGERY Right 11/10/2019    ANKLE OPEN REDUCTION INTERNAL FIXATION performed by Librada Epley, DO at [address] [place] [address]       Discharge Medication List as of 1/9/2020 12:08 AM      CONTINUE these medications which have NOT CHANGED    Details   Vitamin D, Cholecalciferol, 25 MCG (1000 UT) TABS Take 1 tablet by mouth dailyHistorical Med      ibuprofen (ADVIL;MOTRIN) 800 MG tablet Take 1 tablet by mouth every 8 hours as needed for Pain, Disp-21 tablet, R-0Print      meloxicam (MOBIC) 15 MG tablet Take 1 tablet by mouth daily, Disp-30 tablet, R-0Print      terazosin (HYTRIN) 1 MG capsule Take 1 mg by mouth nightlyHistorical Med      gabapentin (NEURONTIN) 400 MG capsule Take 800 mg by mouth 3 times daily. [place] Historical Med      lisinopril (PRINIVIL) 10 MG tablet Take 1 tablet by mouth daily for 7 days. , Disp-7 tablet, R-0Print      glipiZIDE (GLUCOTROL) 10 MG tablet Take 10 mg by mouth 2 times daily (before meals). metFORMIN (GLUCOPHAGE) 1000 MG tablet Take 1,000 mg by mouth 2 times daily (with meals). insulin glargine (LANTUS) 100 UNIT/ML injection vial Inject 40 Units into the skin nightly Historical Med      aspirin 81 MG tablet Take 81 mg by mouth daily. simvastatin (ZOCOR) 10 MG tablet Take  by mouth nightly. ALLERGIES     Keflex [cephalexin]    FAMILY HISTORY     History reviewed. No pertinent family history.        SOCIAL HISTORY       Social History     Socioeconomic History    Marital status:      Spouse name: None    Number of Pulses: Normal pulses. Heart sounds: Normal heart sounds. No murmur. Pulmonary:      Effort: Pulmonary effort is normal. No respiratory distress. Breath sounds: Normal breath sounds. Abdominal:      General: There is no distension. Palpations: Abdomen is soft. Tenderness: There is no tenderness. Musculoskeletal:        Legs:         Feet:    Neurological:      General: No focal deficit present. Mental Status: He is alert and oriented to person, place, and time. DIAGNOSTIC RESULTS     EKG: All EKG's are interpreted by the Emergency Department Physician who either signs or Co-signs this chart in the absence of a cardiologist.        RADIOLOGY:     Interpretation per the Radiologist below, if available at the time of this note:    XR ANKLE RIGHT (MIN 3 VIEWS)   Final Result   1. No acute bony or joint abnormality   2. No radiopaque foreign body         XR FOOT LEFT (MIN 3 VIEWS)   Final Result   1. No acute bony or joint abnormality   2. No radiopaque foreign body               LABS:  Labs Reviewed   CBC WITH AUTO DIFFERENTIAL - Abnormal; Notable for the following components:       Result Value    Neutrophils Absolute 8.5 (*)     All other components within normal limits    Narrative:     Performed at:  [facility]. [facility]  [address] [place] [address]. [address]   Phone [phone]   COMPREHENSIVE METABOLIC PANEL W/ REFLEX TO MG FOR LOW K - Abnormal; Notable for the following components:    Sodium 126 (*)     Chloride 87 (*)     Glucose 724 (*)     Albumin/Globulin Ratio 1.0 (*)     Alkaline Phosphatase 151 (*)     ALT 81 (*)     AST 40 (*)     All other components within normal limits    Narrative:     Virgel Cordial  [facility] tel. [id number],  Chemistry results called to and read back by herbert gillis rn, 01/08/2020 22:20,  by STEMI  Performed at:  [facility]. [facility]  [address] [place] [address].  [address] Phone [phone]   SEDIMENTATION RATE - Abnormal; Notable for the following components:    Sed Rate 31 (*)     All other components within normal limits    Narrative:     Performed at:  [facility]. [facility]  [facility] [place], [place]. [place], [address]    Phone [phone]   CULTURE BLOOD #1   CULTURE BLOOD #2   LACTATE, SEPSIS    Narrative:     Performed at:  [facility]. [facility]  [address] [place] [address]. [place], [address]    Phone [phone]   C-REACTIVE PROTEIN   LACTATE, SEPSIS       All other labs were within normal range or not returned as of this dictation. EMERGENCY DEPARTMENT COURSE and DIFFERENTIAL DIAGNOSIS/MDM:   Vitals:    Vitals:    01/08/20 2019 01/08/20 2310 01/09/20 0012   BP: (!) 175/100 (!) 156/52 (!) 145/83   Pulse: 105 77 87   Resp: 15 15 14   Temp: 98.4 °F (36.9 °C)     TempSrc: Oral     SpO2: 100% 98% 100%   Weight: 174 lb (78.9 kg)     Height: 6' 1\" (1.854 m)         Patient evaluated and previous record reviewed. Patient presents with multiple skin lesions. Vital signs notable for initial tachycardia, hypertension. Physical exam as documented above. Differential includes cellulitis, infected hardware, skin picking. Lab work-up notable for white blood cell count within normal limits, hyperglycemia without evidence of DKA, elevated ESR. Patient covered early with vancomycin, Zosyn. Blood cultures sent and lactate within normal limits. X-rays obtained as documented above. Spoke with orthopedic surgeon on-call Dr. Negro Sanders regarding patient's presentation and surgical wound with known hardware. He felt as though patient could be placed on p.o. antibiotics and follow-up as outpatient. Will place patient on clindamycin. Updated patient's tetanus. Stressed the importance of follow-up with his orthopedic surgeon as soon as possible as an outpatient patient voices understanding.   Gave IV fluids which shows improvement in pulse. Patient left prior to repeat glucose. Patient was advised of at home care instructions as well as return precautions. CONSULTS:  IP CONSULT TO ORTHOPEDIC SURGERY    PROCEDURES:  Unless otherwise noted below, none     Procedures      FINAL IMPRESSION      1. Cellulitis of left lower extremity    2. Picking own skin    3. Non-healing surgical wound, initial encounter          DISPOSITION/PLAN   DISPOSITION Decision To Discharge 01/09/2020 12:02:35 AM      PATIENT REFERRED TO:  Jos Pardo  [address] [address]   [phone]    Schedule an appointment as soon as possible for a visit       DO Lala Goodman [address] [facility] [address]  [phone]    Schedule an appointment as soon as possible for a visit         DISCHARGE MEDICATIONS:  Discharge Medication List as of 1/9/2020 12:08 AM      START taking these medications    Details   clindamycin (CLEOCIN) 150 MG capsule Take 3 capsules by mouth 3 times daily for 10 days, Disp-90 capsule, R-0Print           Controlled Substances Monitoring:     No flowsheet data found.     (Please note that portions of this note were completed with a voice recognition program.  Efforts were made to edit the dictations but occasionally words are mis-transcribed.)    Demetrius Woods MD (electronically signed)  Attending Emergency Physician            Anupam Wong MD  01/09/20 2040

## 2020-01-11 ENCOUNTER — APPOINTMENT (OUTPATIENT)
Dept: GENERAL RADIOLOGY | Age: 65
DRG: 854 | End: 2020-01-11
Payer: OTHER GOVERNMENT

## 2020-01-11 ENCOUNTER — HOSPITAL ENCOUNTER (INPATIENT)
Age: 65
LOS: 4 days | Discharge: HOME HEALTH | DRG: 854 | End: 2020-01-16
Attending: EMERGENCY MEDICINE | Admitting: INTERNAL MEDICINE
Payer: OTHER GOVERNMENT

## 2020-01-11 LAB
A/G RATIO: 0.8 (ref 1.1–2.2)
ALBUMIN SERPL-MCNC: 3.3 G/DL (ref 3.4–5)
ALP BLD-CCNC: 125 U/L (ref 40–129)
ALT SERPL-CCNC: 42 U/L (ref 10–40)
ANION GAP SERPL CALCULATED.3IONS-SCNC: 12 MMOL/L (ref 3–16)
AST SERPL-CCNC: 26 U/L (ref 15–37)
BASOPHILS ABSOLUTE: 0.1 K/UL (ref 0–0.2)
BASOPHILS RELATIVE PERCENT: 1.1 %
BILIRUB SERPL-MCNC: 0.3 MG/DL (ref 0–1)
BUN BLDV-MCNC: 10 MG/DL (ref 7–20)
CALCIUM SERPL-MCNC: 8.8 MG/DL (ref 8.3–10.6)
CHLORIDE BLD-SCNC: 88 MMOL/L (ref 99–110)
CO2: 28 MMOL/L (ref 21–32)
CREAT SERPL-MCNC: 0.8 MG/DL (ref 0.8–1.3)
EOSINOPHILS ABSOLUTE: 0.1 K/UL (ref 0–0.6)
EOSINOPHILS RELATIVE PERCENT: 1.6 %
GFR AFRICAN AMERICAN: >60
GFR NON-AFRICAN AMERICAN: >60
GLOBULIN: 4 G/DL
GLUCOSE BLD-MCNC: 733 MG/DL (ref 70–99)
HCT VFR BLD CALC: 42.2 % (ref 40.5–52.5)
HEMOGLOBIN: 13.6 G/DL (ref 13.5–17.5)
LACTIC ACID, SEPSIS: 3.1 MMOL/L (ref 0.4–1.9)
LYMPHOCYTES ABSOLUTE: 0.9 K/UL (ref 1–5.1)
LYMPHOCYTES RELATIVE PERCENT: 10.3 %
MCH RBC QN AUTO: 29 PG (ref 26–34)
MCHC RBC AUTO-ENTMCNC: 32.3 G/DL (ref 31–36)
MCV RBC AUTO: 89.9 FL (ref 80–100)
MONOCYTES ABSOLUTE: 0.9 K/UL (ref 0–1.3)
MONOCYTES RELATIVE PERCENT: 10.7 %
NEUTROPHILS ABSOLUTE: 6.6 K/UL (ref 1.7–7.7)
NEUTROPHILS RELATIVE PERCENT: 76.3 %
PDW BLD-RTO: 13.6 % (ref 12.4–15.4)
PLATELET # BLD: 206 K/UL (ref 135–450)
PMV BLD AUTO: 9.5 FL (ref 5–10.5)
POTASSIUM REFLEX MAGNESIUM: 4.1 MMOL/L (ref 3.5–5.1)
PROCALCITONIN: 0.16 NG/ML (ref 0–0.15)
RBC # BLD: 4.7 M/UL (ref 4.2–5.9)
SODIUM BLD-SCNC: 128 MMOL/L (ref 136–145)
TOTAL PROTEIN: 7.3 G/DL (ref 6.4–8.2)
WBC # BLD: 8.6 K/UL (ref 4–11)

## 2020-01-11 PROCEDURE — 99285 EMERGENCY DEPT VISIT HI MDM: CPT

## 2020-01-11 PROCEDURE — 6360000002 HC RX W HCPCS: Performed by: EMERGENCY MEDICINE

## 2020-01-11 PROCEDURE — 2580000003 HC RX 258: Performed by: EMERGENCY MEDICINE

## 2020-01-11 PROCEDURE — 80053 COMPREHEN METABOLIC PANEL: CPT

## 2020-01-11 PROCEDURE — 87040 BLOOD CULTURE FOR BACTERIA: CPT

## 2020-01-11 PROCEDURE — 84145 PROCALCITONIN (PCT): CPT

## 2020-01-11 PROCEDURE — 96375 TX/PRO/DX INJ NEW DRUG ADDON: CPT

## 2020-01-11 PROCEDURE — 96365 THER/PROPH/DIAG IV INF INIT: CPT

## 2020-01-11 PROCEDURE — 2500000003 HC RX 250 WO HCPCS: Performed by: EMERGENCY MEDICINE

## 2020-01-11 PROCEDURE — 36415 COLL VENOUS BLD VENIPUNCTURE: CPT

## 2020-01-11 PROCEDURE — 85025 COMPLETE CBC W/AUTO DIFF WBC: CPT

## 2020-01-11 PROCEDURE — 83605 ASSAY OF LACTIC ACID: CPT

## 2020-01-11 PROCEDURE — 73630 X-RAY EXAM OF FOOT: CPT

## 2020-01-11 RX ORDER — 0.9 % SODIUM CHLORIDE 0.9 %
30 INTRAVENOUS SOLUTION INTRAVENOUS ONCE
Status: COMPLETED | OUTPATIENT
Start: 2020-01-11 | End: 2020-01-12

## 2020-01-11 RX ORDER — MORPHINE SULFATE 4 MG/ML
4 INJECTION, SOLUTION INTRAMUSCULAR; INTRAVENOUS EVERY 30 MIN PRN
Status: COMPLETED | OUTPATIENT
Start: 2020-01-11 | End: 2020-01-12

## 2020-01-11 RX ORDER — CLINDAMYCIN PHOSPHATE 900 MG/50ML
900 INJECTION INTRAVENOUS ONCE
Status: COMPLETED | OUTPATIENT
Start: 2020-01-11 | End: 2020-01-11

## 2020-01-11 RX ADMIN — SODIUM CHLORIDE 2313 ML: 9 INJECTION, SOLUTION INTRAVENOUS at 22:56

## 2020-01-11 RX ADMIN — MORPHINE SULFATE 4 MG: 4 INJECTION, SOLUTION INTRAMUSCULAR; INTRAVENOUS at 23:05

## 2020-01-11 RX ADMIN — CLINDAMYCIN PHOSPHATE 900 MG: 18 INJECTION, SOLUTION INTRAMUSCULAR; INTRAVENOUS at 22:57

## 2020-01-11 ASSESSMENT — PAIN DESCRIPTION - ORIENTATION
ORIENTATION: LEFT
ORIENTATION: LEFT

## 2020-01-11 ASSESSMENT — PAIN DESCRIPTION - LOCATION
LOCATION: FOOT
LOCATION: FOOT

## 2020-01-11 ASSESSMENT — PAIN SCALES - GENERAL
PAINLEVEL_OUTOF10: 5
PAINLEVEL_OUTOF10: 5

## 2020-01-12 PROBLEM — A41.9 SEVERE SEPSIS (HCC): Status: ACTIVE | Noted: 2020-01-12

## 2020-01-12 PROBLEM — L03.116 CELLULITIS OF LEFT FOOT: Status: ACTIVE | Noted: 2020-01-12

## 2020-01-12 PROBLEM — R65.20 SEVERE SEPSIS (HCC): Status: ACTIVE | Noted: 2020-01-12

## 2020-01-12 LAB
A/G RATIO: 1.4 (ref 1.1–2.2)
ALBUMIN SERPL-MCNC: 3.2 G/DL (ref 3.4–5)
ALP BLD-CCNC: 192 U/L (ref 40–129)
ALT SERPL-CCNC: 92 U/L (ref 10–40)
ANION GAP SERPL CALCULATED.3IONS-SCNC: 8 MMOL/L (ref 3–16)
AST SERPL-CCNC: 170 U/L (ref 15–37)
BASOPHILS ABSOLUTE: 0.1 K/UL (ref 0–0.2)
BASOPHILS RELATIVE PERCENT: 1 %
BILIRUB SERPL-MCNC: 0.3 MG/DL (ref 0–1)
BLOOD CULTURE, ROUTINE: NORMAL
BUN BLDV-MCNC: 9 MG/DL (ref 7–20)
CALCIUM SERPL-MCNC: 8 MG/DL (ref 8.3–10.6)
CHLORIDE BLD-SCNC: 97 MMOL/L (ref 99–110)
CHP ED QC CHECK: NORMAL
CO2: 23 MMOL/L (ref 21–32)
CREAT SERPL-MCNC: 0.7 MG/DL (ref 0.8–1.3)
CULTURE, BLOOD 2: NORMAL
EOSINOPHILS ABSOLUTE: 0.2 K/UL (ref 0–0.6)
EOSINOPHILS RELATIVE PERCENT: 3 %
GFR AFRICAN AMERICAN: >60
GFR NON-AFRICAN AMERICAN: >60
GLOBULIN: 2.3 G/DL
GLUCOSE BLD-MCNC: 271 MG/DL (ref 70–99)
GLUCOSE BLD-MCNC: 278 MG/DL (ref 70–99)
GLUCOSE BLD-MCNC: 328 MG/DL (ref 70–99)
GLUCOSE BLD-MCNC: 344 MG/DL (ref 70–99)
GLUCOSE BLD-MCNC: 349 MG/DL (ref 70–99)
GLUCOSE BLD-MCNC: 361 MG/DL (ref 70–99)
GLUCOSE BLD-MCNC: 374 MG/DL (ref 70–99)
GLUCOSE BLD-MCNC: 385 MG/DL
GLUCOSE BLD-MCNC: 385 MG/DL (ref 70–99)
HCT VFR BLD CALC: 36.7 % (ref 40.5–52.5)
HEMOGLOBIN: 12.2 G/DL (ref 13.5–17.5)
INR BLD: 1.06 (ref 0.86–1.14)
LACTIC ACID: 1.3 MMOL/L (ref 0.4–2)
LACTIC ACID: 1.5 MMOL/L (ref 0.4–2)
LACTIC ACID: 1.7 MMOL/L (ref 0.4–2)
LYMPHOCYTES ABSOLUTE: 1.1 K/UL (ref 1–5.1)
LYMPHOCYTES RELATIVE PERCENT: 16.6 %
MCH RBC QN AUTO: 29.5 PG (ref 26–34)
MCHC RBC AUTO-ENTMCNC: 33.3 G/DL (ref 31–36)
MCV RBC AUTO: 88.4 FL (ref 80–100)
MONOCYTES ABSOLUTE: 0.7 K/UL (ref 0–1.3)
MONOCYTES RELATIVE PERCENT: 11.2 %
NEUTROPHILS ABSOLUTE: 4.4 K/UL (ref 1.7–7.7)
NEUTROPHILS RELATIVE PERCENT: 68.2 %
PDW BLD-RTO: 13.7 % (ref 12.4–15.4)
PERFORMED ON: ABNORMAL
PLATELET # BLD: 180 K/UL (ref 135–450)
PMV BLD AUTO: 8.8 FL (ref 5–10.5)
POTASSIUM REFLEX MAGNESIUM: 4.1 MMOL/L (ref 3.5–5.1)
PROTHROMBIN TIME: 12.3 SEC (ref 10–13.2)
RBC # BLD: 4.15 M/UL (ref 4.2–5.9)
SODIUM BLD-SCNC: 128 MMOL/L (ref 136–145)
TOTAL PROTEIN: 5.5 G/DL (ref 6.4–8.2)
WBC # BLD: 6.5 K/UL (ref 4–11)

## 2020-01-12 PROCEDURE — 85025 COMPLETE CBC W/AUTO DIFF WBC: CPT

## 2020-01-12 PROCEDURE — 96376 TX/PRO/DX INJ SAME DRUG ADON: CPT

## 2020-01-12 PROCEDURE — 85610 PROTHROMBIN TIME: CPT

## 2020-01-12 PROCEDURE — 83605 ASSAY OF LACTIC ACID: CPT

## 2020-01-12 PROCEDURE — 6370000000 HC RX 637 (ALT 250 FOR IP): Performed by: INTERNAL MEDICINE

## 2020-01-12 PROCEDURE — 80053 COMPREHEN METABOLIC PANEL: CPT

## 2020-01-12 PROCEDURE — 6360000002 HC RX W HCPCS: Performed by: INTERNAL MEDICINE

## 2020-01-12 PROCEDURE — 2580000003 HC RX 258: Performed by: INTERNAL MEDICINE

## 2020-01-12 PROCEDURE — 6360000002 HC RX W HCPCS: Performed by: EMERGENCY MEDICINE

## 2020-01-12 PROCEDURE — 2060000000 HC ICU INTERMEDIATE R&B

## 2020-01-12 PROCEDURE — 6370000000 HC RX 637 (ALT 250 FOR IP): Performed by: EMERGENCY MEDICINE

## 2020-01-12 PROCEDURE — 2500000003 HC RX 250 WO HCPCS: Performed by: INTERNAL MEDICINE

## 2020-01-12 PROCEDURE — 6360000002 HC RX W HCPCS

## 2020-01-12 PROCEDURE — 83036 HEMOGLOBIN GLYCOSYLATED A1C: CPT

## 2020-01-12 PROCEDURE — 96375 TX/PRO/DX INJ NEW DRUG ADDON: CPT

## 2020-01-12 PROCEDURE — 36415 COLL VENOUS BLD VENIPUNCTURE: CPT

## 2020-01-12 RX ORDER — VITAMIN B COMPLEX
1000 TABLET ORAL DAILY
Status: DISCONTINUED | OUTPATIENT
Start: 2020-01-12 | End: 2020-01-16 | Stop reason: HOSPADM

## 2020-01-12 RX ORDER — ACETAMINOPHEN 325 MG/1
650 TABLET ORAL EVERY 4 HOURS PRN
Status: DISCONTINUED | OUTPATIENT
Start: 2020-01-12 | End: 2020-01-16 | Stop reason: HOSPADM

## 2020-01-12 RX ORDER — SODIUM CHLORIDE 0.9 % (FLUSH) 0.9 %
10 SYRINGE (ML) INJECTION EVERY 12 HOURS SCHEDULED
Status: DISCONTINUED | OUTPATIENT
Start: 2020-01-12 | End: 2020-01-16 | Stop reason: HOSPADM

## 2020-01-12 RX ORDER — SODIUM CHLORIDE 0.9 % (FLUSH) 0.9 %
10 SYRINGE (ML) INJECTION PRN
Status: DISCONTINUED | OUTPATIENT
Start: 2020-01-12 | End: 2020-01-16 | Stop reason: HOSPADM

## 2020-01-12 RX ORDER — INSULIN GLARGINE 100 [IU]/ML
40 INJECTION, SOLUTION SUBCUTANEOUS NIGHTLY
Status: DISCONTINUED | OUTPATIENT
Start: 2020-01-12 | End: 2020-01-16 | Stop reason: HOSPADM

## 2020-01-12 RX ORDER — NICOTINE POLACRILEX 4 MG
15 LOZENGE BUCCAL PRN
Status: DISCONTINUED | OUTPATIENT
Start: 2020-01-12 | End: 2020-01-16 | Stop reason: HOSPADM

## 2020-01-12 RX ORDER — ASPIRIN 81 MG/1
81 TABLET, CHEWABLE ORAL DAILY
Status: DISCONTINUED | OUTPATIENT
Start: 2020-01-12 | End: 2020-01-16 | Stop reason: HOSPADM

## 2020-01-12 RX ORDER — MORPHINE SULFATE 4 MG/ML
4 INJECTION, SOLUTION INTRAMUSCULAR; INTRAVENOUS
Status: DISCONTINUED | OUTPATIENT
Start: 2020-01-12 | End: 2020-01-12

## 2020-01-12 RX ORDER — SODIUM CHLORIDE 9 MG/ML
INJECTION, SOLUTION INTRAVENOUS CONTINUOUS
Status: DISCONTINUED | OUTPATIENT
Start: 2020-01-12 | End: 2020-01-15

## 2020-01-12 RX ORDER — SIMVASTATIN 10 MG
10 TABLET ORAL NIGHTLY
Status: DISCONTINUED | OUTPATIENT
Start: 2020-01-12 | End: 2020-01-16 | Stop reason: HOSPADM

## 2020-01-12 RX ORDER — GABAPENTIN 400 MG/1
800 CAPSULE ORAL 3 TIMES DAILY
Status: DISCONTINUED | OUTPATIENT
Start: 2020-01-12 | End: 2020-01-16 | Stop reason: HOSPADM

## 2020-01-12 RX ORDER — DEXTROSE MONOHYDRATE 25 G/50ML
12.5 INJECTION, SOLUTION INTRAVENOUS PRN
Status: DISCONTINUED | OUTPATIENT
Start: 2020-01-12 | End: 2020-01-16 | Stop reason: HOSPADM

## 2020-01-12 RX ORDER — DOXAZOSIN 2 MG/1
1 TABLET ORAL NIGHTLY
Status: DISCONTINUED | OUTPATIENT
Start: 2020-01-12 | End: 2020-01-16 | Stop reason: HOSPADM

## 2020-01-12 RX ORDER — MORPHINE SULFATE 2 MG/ML
2 INJECTION, SOLUTION INTRAMUSCULAR; INTRAVENOUS
Status: DISCONTINUED | OUTPATIENT
Start: 2020-01-12 | End: 2020-01-12

## 2020-01-12 RX ORDER — DEXTROSE MONOHYDRATE 50 MG/ML
100 INJECTION, SOLUTION INTRAVENOUS PRN
Status: DISCONTINUED | OUTPATIENT
Start: 2020-01-12 | End: 2020-01-16 | Stop reason: HOSPADM

## 2020-01-12 RX ORDER — CLINDAMYCIN PHOSPHATE 900 MG/50ML
900 INJECTION INTRAVENOUS EVERY 8 HOURS
Status: DISCONTINUED | OUTPATIENT
Start: 2020-01-12 | End: 2020-01-13

## 2020-01-12 RX ORDER — HYDROCODONE BITARTRATE AND ACETAMINOPHEN 5; 325 MG/1; MG/1
1 TABLET ORAL EVERY 4 HOURS PRN
Status: DISCONTINUED | OUTPATIENT
Start: 2020-01-12 | End: 2020-01-16 | Stop reason: HOSPADM

## 2020-01-12 RX ORDER — LISINOPRIL 10 MG/1
10 TABLET ORAL DAILY
Status: DISCONTINUED | OUTPATIENT
Start: 2020-01-12 | End: 2020-01-16 | Stop reason: HOSPADM

## 2020-01-12 RX ORDER — MORPHINE SULFATE 4 MG/ML
INJECTION, SOLUTION INTRAMUSCULAR; INTRAVENOUS
Status: COMPLETED
Start: 2020-01-12 | End: 2020-01-12

## 2020-01-12 RX ADMIN — MORPHINE SULFATE 4 MG: 4 INJECTION, SOLUTION INTRAMUSCULAR; INTRAVENOUS at 04:24

## 2020-01-12 RX ADMIN — GABAPENTIN 800 MG: 400 CAPSULE ORAL at 08:55

## 2020-01-12 RX ADMIN — GABAPENTIN 800 MG: 400 CAPSULE ORAL at 20:21

## 2020-01-12 RX ADMIN — MORPHINE SULFATE 4 MG: 4 INJECTION, SOLUTION INTRAMUSCULAR; INTRAVENOUS at 01:18

## 2020-01-12 RX ADMIN — Medication 10 ML: at 20:24

## 2020-01-12 RX ADMIN — LISINOPRIL 10 MG: 10 TABLET ORAL at 08:55

## 2020-01-12 RX ADMIN — CLINDAMYCIN PHOSPHATE 900 MG: 900 INJECTION, SOLUTION INTRAVENOUS at 14:37

## 2020-01-12 RX ADMIN — DOXAZOSIN 1 MG: 2 TABLET ORAL at 20:21

## 2020-01-12 RX ADMIN — MORPHINE SULFATE 4 MG: 4 INJECTION, SOLUTION INTRAMUSCULAR; INTRAVENOUS at 00:02

## 2020-01-12 RX ADMIN — HYDROCODONE BITARTRATE AND ACETAMINOPHEN 1 TABLET: 5; 325 TABLET ORAL at 12:30

## 2020-01-12 RX ADMIN — MORPHINE SULFATE 4 MG: 4 INJECTION, SOLUTION INTRAMUSCULAR; INTRAVENOUS at 08:55

## 2020-01-12 RX ADMIN — SIMVASTATIN 10 MG: 10 TABLET, FILM COATED ORAL at 20:21

## 2020-01-12 RX ADMIN — VITAMIN D 1000 UNITS: 25 TAB ORAL at 08:55

## 2020-01-12 RX ADMIN — SODIUM CHLORIDE: 900 INJECTION, SOLUTION INTRAVENOUS at 04:20

## 2020-01-12 RX ADMIN — CLINDAMYCIN PHOSPHATE 900 MG: 900 INJECTION, SOLUTION INTRAVENOUS at 06:31

## 2020-01-12 RX ADMIN — INSULIN LISPRO 6 UNITS: 100 INJECTION, SOLUTION INTRAVENOUS; SUBCUTANEOUS at 12:31

## 2020-01-12 RX ADMIN — HYDROCODONE BITARTRATE AND ACETAMINOPHEN 1 TABLET: 5; 325 TABLET ORAL at 16:58

## 2020-01-12 RX ADMIN — CLINDAMYCIN PHOSPHATE 900 MG: 900 INJECTION, SOLUTION INTRAVENOUS at 23:15

## 2020-01-12 RX ADMIN — GABAPENTIN 800 MG: 400 CAPSULE ORAL at 14:37

## 2020-01-12 RX ADMIN — INSULIN LISPRO 10 UNITS: 100 INJECTION, SOLUTION INTRAVENOUS; SUBCUTANEOUS at 09:02

## 2020-01-12 RX ADMIN — SODIUM CHLORIDE: 900 INJECTION, SOLUTION INTRAVENOUS at 14:38

## 2020-01-12 RX ADMIN — INSULIN HUMAN 8 UNITS: 100 INJECTION, SOLUTION PARENTERAL at 00:05

## 2020-01-12 RX ADMIN — INSULIN GLARGINE 40 UNITS: 100 INJECTION, SOLUTION SUBCUTANEOUS at 20:24

## 2020-01-12 RX ADMIN — ENOXAPARIN SODIUM 40 MG: 40 INJECTION SUBCUTANEOUS at 08:55

## 2020-01-12 RX ADMIN — ASPIRIN 81 MG 81 MG: 81 TABLET ORAL at 08:55

## 2020-01-12 RX ADMIN — INSULIN LISPRO 6 UNITS: 100 INJECTION, SOLUTION INTRAVENOUS; SUBCUTANEOUS at 16:59

## 2020-01-12 RX ADMIN — INSULIN LISPRO 5 UNITS: 100 INJECTION, SOLUTION INTRAVENOUS; SUBCUTANEOUS at 20:25

## 2020-01-12 ASSESSMENT — PAIN DESCRIPTION - LOCATION
LOCATION: FOOT

## 2020-01-12 ASSESSMENT — PAIN DESCRIPTION - ORIENTATION
ORIENTATION: LEFT

## 2020-01-12 ASSESSMENT — PAIN DESCRIPTION - DESCRIPTORS
DESCRIPTORS: ACHING
DESCRIPTORS: ACHING

## 2020-01-12 ASSESSMENT — PAIN - FUNCTIONAL ASSESSMENT
PAIN_FUNCTIONAL_ASSESSMENT: PREVENTS OR INTERFERES SOME ACTIVE ACTIVITIES AND ADLS
PAIN_FUNCTIONAL_ASSESSMENT: PREVENTS OR INTERFERES SOME ACTIVE ACTIVITIES AND ADLS
PAIN_FUNCTIONAL_ASSESSMENT: PREVENTS OR INTERFERES WITH MANY ACTIVE NOT PASSIVE ACTIVITIES

## 2020-01-12 ASSESSMENT — PAIN SCALES - GENERAL
PAINLEVEL_OUTOF10: 2
PAINLEVEL_OUTOF10: 6
PAINLEVEL_OUTOF10: 8
PAINLEVEL_OUTOF10: 4
PAINLEVEL_OUTOF10: 7
PAINLEVEL_OUTOF10: 8
PAINLEVEL_OUTOF10: 5

## 2020-01-12 ASSESSMENT — PAIN DESCRIPTION - FREQUENCY
FREQUENCY: CONTINUOUS

## 2020-01-12 ASSESSMENT — PAIN DESCRIPTION - PAIN TYPE
TYPE: ACUTE PAIN

## 2020-01-12 ASSESSMENT — PAIN DESCRIPTION - PROGRESSION
CLINICAL_PROGRESSION: NOT CHANGED

## 2020-01-12 ASSESSMENT — PAIN DESCRIPTION - ONSET
ONSET: ON-GOING

## 2020-01-12 NOTE — H&P
History and Physical        HISTORY OF PRESENT ILLNESS:   58 yo presented to [facility] [place] [address] with L foot infection. Patient with history of uncontrolled insulin-dependent diabetes mellitus, not compliant with his insulin therapy, history of hypertension hyperlipidemia. He reportedly stepped on a nail 2 wks ago and has since been developing a worsening foot infection. He was seen in ED on the 8th and was prescribed clinda Rx but did not pick it up. Patient states that he lost the prescription. He states that his foot really got worse over the last 3 days. With increasing pain swelling and redness of his left foot. Pain is about a 5 on a scale of 1-10. It is a constant aching pain. no fevers or chills  Patient admitted for left foot cellulitis with sepsis. X-rays does show foreign body. Dietary consulted. Patient was tachycardic and tachypneic with mild lactic acidosis on presentation, which is improved today with IV hydration and IV antibiotics. Patient was severely hyperglycemic with blood sugars over 700, no acidosis. Patient admits that he has not been compliant with his insulin. Being hydrated with IV fluids and blood sugars are improving.       Past Medical History:   Diagnosis Date    Arthritis     Diabetes mellitus ([facility] [address]     Hx of blood clots     Hyperlipidemia     Hypertension        Past Surgical History:   Procedure Laterality Date    ANKLE FRACTURE SURGERY Right 11/10/2019    ORIF right ankle    ANKLE FRACTURE SURGERY Right 11/10/2019    ANKLE OPEN REDUCTION INTERNAL FIXATION performed by Javier Bobby DO at [place] [address]         Patient is allergic to keflex [cephalexin]. Prior to Admission medications    Medication Sig Start Date End Date Taking?  Authorizing Provider   clindamycin (CLEOCIN) 150 MG capsule Take 3 capsules by mouth 3 times daily for 10 days 1/9/20 1/19/20  Demetrius Barker MD   Vitamin D, Cholecalciferol, 25 MCG (1000 UT) TABS Take 1 tablet by mouth daily    Historical Provider, MD   ibuprofen (ADVIL;MOTRIN) 800 MG tablet Take 1 tablet by mouth every 8 hours as needed for Pain 12/7/19 12/14/19  Adina Mccord DO   meloxicam (MOBIC) 15 MG tablet Take 1 tablet by mouth daily 11/11/19   Letha Weir MD   terazosin (HYTRIN) 1 MG capsule Take 1 mg by mouth nightly    Historical Provider, MD   gabapentin (NEURONTIN) 400 MG capsule Take 800 mg by mouth 3 times daily. [facility] Historical Provider, MD   lisinopril (PRINIVIL) 10 MG tablet Take 1 tablet by mouth daily for 7 days. 3/22/15 11/13/18  Grant Jeronimo MD   glipiZIDE (GLUCOTROL) 10 MG tablet Take 10 mg by mouth 2 times daily (before meals). Historical Provider, MD   metFORMIN (GLUCOPHAGE) 1000 MG tablet Take 1,000 mg by mouth 2 times daily (with meals). Historical Provider, MD   insulin glargine (LANTUS) 100 UNIT/ML injection vial Inject 40 Units into the skin nightly     Historical Provider, MD   aspirin 81 MG tablet Take 81 mg by mouth daily. Historical Provider, MD   simvastatin (ZOCOR) 10 MG tablet Take  by mouth nightly.     Historical Provider, MD       Social History     Socioeconomic History    Marital status:      Spouse name: None    Number of children: None    Years of education: None    Highest education level: None   Occupational History    None   Social Needs    Financial resource strain: None    Food insecurity:     Worry: None     Inability: None    Transportation needs:     Medical: None     Non-medical: None   Tobacco Use    Smoking status: Current Every Day Smoker     Packs/day: 1.00     Types: Cigarettes    Smokeless tobacco: Former User   Substance and Sexual Activity    Alcohol use: No    Drug use: Yes     Types: Methamphetamines, Opiates , Marijuana     Comment: Last use 4 or 5 days ago    Sexual activity: Never     Partners: Female   Lifestyle    Physical activity:     Days per week: None     Minutes per session: None    [facility])  Resolved Problems:    * No resolved hospital problems. *      PLAN:    Left foot cellulitis  Sepsis  -Secondary to patient with uncontrolled DM  stepping on a nail 2 weeks ago. - X-rays does show foreign body  -Consult podiatry. Will likely need surgical debridement  -Continue IV antibiotics clindamycin.  -Patient had mild sepsis, with tachycardia, tachypnea, lactic acidosis,   which is now improved with IV hydration.  -We will also order Norco as needed for pain control    Diabetes mellitus, type 2, insulin-dependent  -Uncontrolled with severe hyperglycemia  -Blood sugars over 700 on presentation  -She was not compliant with his home medication  -Resume Lantus as at home , continue sliding scale insulin coverage . - continue IV fluid hydration monitor blood sugars closely.   blood sugars currently in the 300s    Hyponatremia  -This is pseudohyponatremia, secondary to hyperglycemia   -continue IV hydration ,  monitor sodium levels    Heart murmur  - recent  echocardiogram reviewed,  he has a sclerotic aortic valve    Hypertension  -  blood pressure stable    Hyperlipidemia   -on statins       Lovenox for DVT prophylaxis  DIET CARB CONTROL; Carb Control: 4 carb choices (60 gms)/meal   Full Code          Asa Poisson 1/12/2020 10:10 AM

## 2020-01-12 NOTE — ED NOTES
Awaiting bed assignment at [facility].       Andrey Minor  01/12/20 5306

## 2020-01-12 NOTE — ED PROVIDER NOTES
Emergency Physician Note    Chief Complaint  Cellulitis (pt states he stepped on a nail x2 weeks ago, states he was seen in ED x4 days ago and given tetanus shot, states he was unable to get prescription filled and infection on L foot has worsened)       History of Present Illness  Laurent Steen is a 59 y.o. male who presents to the ED for increasing worsening infection of his left foot. Patient was seen 3 days ago in this ER. 2 weeks prior to that he had stepped on the nail and was concerned that he may have developed an infection of his left foot. At that time it was indicated that he may have some streaking on the bottom part of his right foot but no other further infection. Patient was discharged on clindamycin prescription. They were unable to find the prescription and forth therefore they did not fill out the prescription for antibiotics. Patient states this morning he noticed his left middle toe was extremely red and then more parts of his left foot got to be more more red and swollen and more painful. He is also had a little bit of drainage from his foot. Denies fever, chills, malaise, chest pain, shortness of breath, cough, abdominal pain, nausea, vomiting, diarrhea, headache, sore throat, dysuria, back pain, rash. No palliative/provocative factors. Unless otherwise stated in this report or unable to obtain because of the patient's clinical or mental status as evidenced by the medical record, this patient's positive and negative responses for review of systems, constitutional, psych, eyes, ENT, cardiovascular, respiratory, gastrointestinal, neurological, genitourinary, musculoskeletal, integument systems and systems related to the presenting problem are either stated in the preceding paragraph or were not pertinent or were negative for the symptoms and/or complaints related to the medical problem.     I have reviewed the following from the nursing documentation:      Prior to Admission medications    Medication Sig Start Date End Date Taking? Authorizing Provider   clindamycin (CLEOCIN) 150 MG capsule Take 3 capsules by mouth 3 times daily for 10 days 1/9/20 1/19/20  Demetrius Barker MD   Vitamin D, Cholecalciferol, 25 MCG (1000 UT) TABS Take 1 tablet by mouth daily    Historical Provider, MD   ibuprofen (ADVIL;MOTRIN) 800 MG tablet Take 1 tablet by mouth every 8 hours as needed for Pain 12/7/19 12/14/19  Supriya Lawrence DO   meloxicam (MOBIC) 15 MG tablet Take 1 tablet by mouth daily 11/11/19   Justino Jurado MD   terazosin (HYTRIN) 1 MG capsule Take 1 mg by mouth nightly    Historical Provider, MD   gabapentin (NEURONTIN) 400 MG capsule Take 800 mg by mouth 3 times daily. Sherri [facility] Historical Provider, MD   lisinopril (PRINIVIL) 10 MG tablet Take 1 tablet by mouth daily for 7 days. 3/22/15 11/13/18  Yolanda Rasheed MD   glipiZIDE (GLUCOTROL) 10 MG tablet Take 10 mg by mouth 2 times daily (before meals). Historical Provider, MD   metFORMIN (GLUCOPHAGE) 1000 MG tablet Take 1,000 mg by mouth 2 times daily (with meals). Historical Provider, MD   insulin glargine (LANTUS) 100 UNIT/ML injection vial Inject 40 Units into the skin nightly     Historical Provider, MD   aspirin 81 MG tablet Take 81 mg by mouth daily. Historical Provider, MD   simvastatin (ZOCOR) 10 MG tablet Take  by mouth nightly.     Historical Provider, MD       Allergies as of 01/11/2020 - Review Complete 01/11/2020   Allergen Reaction Noted    Keflex [cephalexin] Swelling 07/02/2014       Past Medical History:   Diagnosis Date    Arthritis     Diabetes mellitus ([address]     Hx of blood clots     Hyperlipidemia     Hypertension         Surgical History:   Past Surgical History:   Procedure Laterality Date    ANKLE FRACTURE SURGERY Right 11/10/2019    ORIF right ankle    ANKLE FRACTURE SURGERY Right 11/10/2019    ANKLE OPEN REDUCTION INTERNAL FIXATION performed by Javier Bobby DO at [address] [place] [address] Excl. in [address]? GENERAL:  Awake, alert. Well developed, well nourished with no apparent distress. Nontoxic-appearing, non-ill-appearing. HENT:  Normocephalic, Atraumatic, no lacerations. EYES:  Conjunctiva normal, Pupils equal round and reactive to light, extraocular movements normal.  NECK:  Trachea is midline. No stridor. CHEST:  Regular rate and regular rhythm, no murmurs/rubs/gallops, normal S1/S2, chest wall non-tender. LUNGS:  No respiratory distress. No abdominal retractions, no sternal retractions. Clear to auscultation bilaterally, no wheezing, no rhochi, no rales  ABDOMEN:  Soft, non-tender, non-distended. No guarding and no rebound. Normal BS, no organomegaly, no abdominal masses  EXTREMITIES:  Normal range of motion, distal pulses present and equal bilaterally. Moves extremities x4 with purpose. Left foot has a well demarcated area of erythema and involving the middle 3 toes and the top part of his foot. On the bottom part of his foot there is an obvious puncture wound with mild erythema but no fluctuance. There is fluctuance next to his middle digit and there is active drainage. That area of the foot is also very edematous and tender to palpation. No crepitus, no ecchymosis. SKIN: Warm, dry and multiple ulcers on her upper extremity and lower extremity. Other than his foot all of the ulcers are very superficial and do not appear to be infected and appear to be chronic  NEUROLOGIC: Normal mental status. Moving all extremities to command. Alert and oriented x4 without focal motor deficit or gross sensory deficit. Normal speech. Strength 5/5 in bilateral upper and lower extremities. PSYCHIATRIC: Not anxious, normal mood and affect, thoughts are linear and organized, without delusions/hallucinations, responds appropriately to questions      LABS and DIAGNOSTIC RESULTS    RADIOLOGY  X-RAYS:  I have reviewed radiologic plain film image(s).   ALL OTHER NON-PLAIN FILM IMAGES SUCH AS CT, ULTRASOUND AND MRI HAVE BEEN READ BY THE RADIOLOGIST. XR FOOT LEFT (MIN 3 VIEWS)   Final Result   2 mm radiopaque density within the plantar soft tissues of the forefoot   between the 3rd and 4th digit which may reflect a foreign body. Mild soft   tissue swelling without acute osseous abnormality. MEDICAL DECISION MAKING    Procedures/interventions/images ordered for this visit  Orders Placed This Encounter   Procedures    Culture blood #1    Culture blood #2    XR FOOT LEFT (MIN 3 VIEWS)    Procalcitonin    CBC auto differential    Comprehensive Metabolic Panel w/ Reflex to MG    Lactate, Sepsis    Initiate Oxygen Therapy Protocol       Medications ordered for this visit  Orders Placed This Encounter   Medications    0.9 % sodium chloride IV bolus 2,313 mL    clindamycin (CLEOCIN) 900 mg in dextrose 5 % 50 mL IVPB       ED course notes for this visit       The patient does not look toxic to me he does however have some concerning findings within his foot as well as it within his labs. Patient does admit that he had stopped taking his diabetes medications couple days ago but he restarted again today.   Had a discussion with him about his abnormal labs particularly and hyperglycemia lactic acidosis as well as the high procalcitonin and he agreed to admission.    - Patient seen and evaluated in room 7    Results for orders placed or performed during the hospital encounter of 01/11/20   Procalcitonin   Result Value Ref Range    Procalcitonin 0.16 (H) 0.00 - 0.15 ng/mL   CBC auto differential   Result Value Ref Range    WBC 8.6 4.0 - 11.0 K/uL    RBC 4.70 4.20 - 5.90 M/uL    Hemoglobin 13.6 13.5 - 17.5 g/dL    Hematocrit 42.2 40.5 - 52.5 %    MCV 89.9 80.0 - 100.0 fL    MCH 29.0 26.0 - 34.0 pg    MCHC 32.3 31.0 - 36.0 g/dL    RDW 13.6 12.4 - 15.4 %    Platelets 010 157 - 647 K/uL    MPV 9.5 5.0 - 10.5 fL    Neutrophils % 76.3 %    Lymphocytes % 10.3 %    Monocytes % 10.7 %    Eosinophils

## 2020-01-12 NOTE — PLAN OF CARE
60 yo presented [facility] with L foot infection. He reportedly stepped on a nail 2 wks ago and has since been developing a worsening foot infection. He was seen in ED on the 8th and was prescribed clinda Rx but did not pick it up. Severe Sepsis  L foot infection/wound/cellulitis w/ possible foreign body.     Lactic acidosis  Marked hyperglycemia, 733  Pseudohyponatremia, 128 measured, corrects to 143  Tachycardia    Admit to PCU

## 2020-01-13 ENCOUNTER — ANESTHESIA (OUTPATIENT)
Dept: OPERATING ROOM | Age: 65
DRG: 854 | End: 2020-01-13
Payer: OTHER GOVERNMENT

## 2020-01-13 ENCOUNTER — APPOINTMENT (OUTPATIENT)
Dept: MRI IMAGING | Age: 65
DRG: 854 | End: 2020-01-13
Payer: OTHER GOVERNMENT

## 2020-01-13 ENCOUNTER — ANESTHESIA EVENT (OUTPATIENT)
Dept: OPERATING ROOM | Age: 65
DRG: 854 | End: 2020-01-13
Payer: OTHER GOVERNMENT

## 2020-01-13 VITALS
RESPIRATION RATE: 10 BRPM | OXYGEN SATURATION: 100 % | SYSTOLIC BLOOD PRESSURE: 106 MMHG | DIASTOLIC BLOOD PRESSURE: 66 MMHG

## 2020-01-13 PROBLEM — L97.509 TYPE 2 DIABETES MELLITUS WITH FOOT ULCER, WITH LONG-TERM CURRENT USE OF INSULIN (HCC): Status: ACTIVE | Noted: 2020-01-13

## 2020-01-13 PROBLEM — E11.621 TYPE 2 DIABETES MELLITUS WITH FOOT ULCER, WITH LONG-TERM CURRENT USE OF INSULIN (HCC): Status: ACTIVE | Noted: 2020-01-13

## 2020-01-13 PROBLEM — Z79.4 TYPE 2 DIABETES MELLITUS WITH FOOT ULCER, WITH LONG-TERM CURRENT USE OF INSULIN (HCC): Status: ACTIVE | Noted: 2020-01-13

## 2020-01-13 LAB
ANION GAP SERPL CALCULATED.3IONS-SCNC: 10 MMOL/L (ref 3–16)
BUN BLDV-MCNC: 10 MG/DL (ref 7–20)
CALCIUM SERPL-MCNC: 7.7 MG/DL (ref 8.3–10.6)
CHLORIDE BLD-SCNC: 95 MMOL/L (ref 99–110)
CO2: 24 MMOL/L (ref 21–32)
CREAT SERPL-MCNC: 0.6 MG/DL (ref 0.8–1.3)
ESTIMATED AVERAGE GLUCOSE: 349.4 MG/DL
GFR AFRICAN AMERICAN: >60
GFR NON-AFRICAN AMERICAN: >60
GLUCOSE BLD-MCNC: 106 MG/DL (ref 70–99)
GLUCOSE BLD-MCNC: 215 MG/DL (ref 70–99)
GLUCOSE BLD-MCNC: 235 MG/DL (ref 70–99)
GLUCOSE BLD-MCNC: 241 MG/DL (ref 70–99)
GLUCOSE BLD-MCNC: 268 MG/DL (ref 70–99)
GLUCOSE BLD-MCNC: 387 MG/DL (ref 70–99)
GLUCOSE BLD-MCNC: 73 MG/DL (ref 70–99)
HBA1C MFR BLD: 13.8 %
HCT VFR BLD CALC: 35.3 % (ref 40.5–52.5)
HEMOGLOBIN: 12.2 G/DL (ref 13.5–17.5)
MAGNESIUM: 1.6 MG/DL (ref 1.8–2.4)
MCH RBC QN AUTO: 29.9 PG (ref 26–34)
MCHC RBC AUTO-ENTMCNC: 34.4 G/DL (ref 31–36)
MCV RBC AUTO: 86.9 FL (ref 80–100)
PDW BLD-RTO: 13.7 % (ref 12.4–15.4)
PERFORMED ON: ABNORMAL
PERFORMED ON: NORMAL
PHOSPHORUS: 3 MG/DL (ref 2.5–4.9)
PLATELET # BLD: 163 K/UL (ref 135–450)
PMV BLD AUTO: 9 FL (ref 5–10.5)
POTASSIUM SERPL-SCNC: 4.3 MMOL/L (ref 3.5–5.1)
RBC # BLD: 4.06 M/UL (ref 4.2–5.9)
SODIUM BLD-SCNC: 129 MMOL/L (ref 136–145)
WBC # BLD: 4.7 K/UL (ref 4–11)

## 2020-01-13 PROCEDURE — 36415 COLL VENOUS BLD VENIPUNCTURE: CPT

## 2020-01-13 PROCEDURE — 2580000003 HC RX 258: Performed by: INTERNAL MEDICINE

## 2020-01-13 PROCEDURE — 6370000000 HC RX 637 (ALT 250 FOR IP): Performed by: INTERNAL MEDICINE

## 2020-01-13 PROCEDURE — 2700000000 HC OXYGEN THERAPY PER DAY

## 2020-01-13 PROCEDURE — 87075 CULTR BACTERIA EXCEPT BLOOD: CPT

## 2020-01-13 PROCEDURE — 6360000002 HC RX W HCPCS: Performed by: ANESTHESIOLOGY

## 2020-01-13 PROCEDURE — 83735 ASSAY OF MAGNESIUM: CPT

## 2020-01-13 PROCEDURE — 73718 MRI LOWER EXTREMITY W/O DYE: CPT

## 2020-01-13 PROCEDURE — 86403 PARTICLE AGGLUT ANTBDY SCRN: CPT

## 2020-01-13 PROCEDURE — 3600000012 HC SURGERY LEVEL 2 ADDTL 15MIN: Performed by: PODIATRIST

## 2020-01-13 PROCEDURE — 2580000003 HC RX 258: Performed by: PODIATRIST

## 2020-01-13 PROCEDURE — 6360000002 HC RX W HCPCS: Performed by: PODIATRIST

## 2020-01-13 PROCEDURE — 3600000002 HC SURGERY LEVEL 2 BASE: Performed by: PODIATRIST

## 2020-01-13 PROCEDURE — 87077 CULTURE AEROBIC IDENTIFY: CPT

## 2020-01-13 PROCEDURE — 7100000000 HC PACU RECOVERY - FIRST 15 MIN: Performed by: PODIATRIST

## 2020-01-13 PROCEDURE — 6360000002 HC RX W HCPCS: Performed by: INTERNAL MEDICINE

## 2020-01-13 PROCEDURE — 2709999900 HC NON-CHARGEABLE SUPPLY: Performed by: PODIATRIST

## 2020-01-13 PROCEDURE — 87205 SMEAR GRAM STAIN: CPT

## 2020-01-13 PROCEDURE — 1200000000 HC SEMI PRIVATE

## 2020-01-13 PROCEDURE — 3700000001 HC ADD 15 MINUTES (ANESTHESIA): Performed by: PODIATRIST

## 2020-01-13 PROCEDURE — 87186 SC STD MICRODIL/AGAR DIL: CPT

## 2020-01-13 PROCEDURE — 2580000003 HC RX 258: Performed by: ANESTHESIOLOGY

## 2020-01-13 PROCEDURE — 3700000000 HC ANESTHESIA ATTENDED CARE: Performed by: PODIATRIST

## 2020-01-13 PROCEDURE — 2500000003 HC RX 250 WO HCPCS: Performed by: INTERNAL MEDICINE

## 2020-01-13 PROCEDURE — 84100 ASSAY OF PHOSPHORUS: CPT

## 2020-01-13 PROCEDURE — 85027 COMPLETE CBC AUTOMATED: CPT

## 2020-01-13 PROCEDURE — 99233 SBSQ HOSP IP/OBS HIGH 50: CPT | Performed by: INTERNAL MEDICINE

## 2020-01-13 PROCEDURE — 2060000000 HC ICU INTERMEDIATE R&B

## 2020-01-13 PROCEDURE — 87076 CULTURE ANAEROBE IDENT EACH: CPT

## 2020-01-13 PROCEDURE — 2500000003 HC RX 250 WO HCPCS: Performed by: PODIATRIST

## 2020-01-13 PROCEDURE — 87070 CULTURE OTHR SPECIMN AEROBIC: CPT

## 2020-01-13 PROCEDURE — 80048 BASIC METABOLIC PNL TOTAL CA: CPT

## 2020-01-13 PROCEDURE — 6370000000 HC RX 637 (ALT 250 FOR IP): Performed by: PODIATRIST

## 2020-01-13 PROCEDURE — 7100000001 HC PACU RECOVERY - ADDTL 15 MIN: Performed by: PODIATRIST

## 2020-01-13 RX ORDER — ONDANSETRON 2 MG/ML
INJECTION INTRAMUSCULAR; INTRAVENOUS PRN
Status: DISCONTINUED | OUTPATIENT
Start: 2020-01-13 | End: 2020-01-13 | Stop reason: SDUPTHER

## 2020-01-13 RX ORDER — LABETALOL HYDROCHLORIDE 5 MG/ML
5 INJECTION, SOLUTION INTRAVENOUS EVERY 10 MIN PRN
Status: DISCONTINUED | OUTPATIENT
Start: 2020-01-13 | End: 2020-01-13 | Stop reason: HOSPADM

## 2020-01-13 RX ORDER — PROMETHAZINE HYDROCHLORIDE 25 MG/ML
6.25 INJECTION, SOLUTION INTRAMUSCULAR; INTRAVENOUS
Status: DISCONTINUED | OUTPATIENT
Start: 2020-01-13 | End: 2020-01-13 | Stop reason: HOSPADM

## 2020-01-13 RX ORDER — PROPOFOL 10 MG/ML
INJECTION, EMULSION INTRAVENOUS PRN
Status: DISCONTINUED | OUTPATIENT
Start: 2020-01-13 | End: 2020-01-13 | Stop reason: SDUPTHER

## 2020-01-13 RX ORDER — OXYCODONE HYDROCHLORIDE AND ACETAMINOPHEN 5; 325 MG/1; MG/1
1 TABLET ORAL PRN
Status: DISCONTINUED | OUTPATIENT
Start: 2020-01-13 | End: 2020-01-13 | Stop reason: HOSPADM

## 2020-01-13 RX ORDER — ONDANSETRON 2 MG/ML
4 INJECTION INTRAMUSCULAR; INTRAVENOUS PRN
Status: DISCONTINUED | OUTPATIENT
Start: 2020-01-13 | End: 2020-01-13 | Stop reason: HOSPADM

## 2020-01-13 RX ORDER — MORPHINE SULFATE 10 MG/ML
2 INJECTION, SOLUTION INTRAMUSCULAR; INTRAVENOUS EVERY 5 MIN PRN
Status: DISCONTINUED | OUTPATIENT
Start: 2020-01-13 | End: 2020-01-13 | Stop reason: HOSPADM

## 2020-01-13 RX ORDER — OXYCODONE HYDROCHLORIDE AND ACETAMINOPHEN 5; 325 MG/1; MG/1
1 TABLET ORAL EVERY 4 HOURS PRN
Status: DISCONTINUED | OUTPATIENT
Start: 2020-01-13 | End: 2020-01-16 | Stop reason: HOSPADM

## 2020-01-13 RX ORDER — POTASSIUM CHLORIDE 20 MEQ/1
40 TABLET, EXTENDED RELEASE ORAL PRN
Status: DISCONTINUED | OUTPATIENT
Start: 2020-01-13 | End: 2020-01-16 | Stop reason: HOSPADM

## 2020-01-13 RX ORDER — SODIUM CHLORIDE, SODIUM LACTATE, POTASSIUM CHLORIDE, CALCIUM CHLORIDE 600; 310; 30; 20 MG/100ML; MG/100ML; MG/100ML; MG/100ML
INJECTION, SOLUTION INTRAVENOUS CONTINUOUS PRN
Status: DISCONTINUED | OUTPATIENT
Start: 2020-01-13 | End: 2020-01-13 | Stop reason: SDUPTHER

## 2020-01-13 RX ORDER — POTASSIUM CHLORIDE 7.45 MG/ML
10 INJECTION INTRAVENOUS PRN
Status: DISCONTINUED | OUTPATIENT
Start: 2020-01-13 | End: 2020-01-16 | Stop reason: HOSPADM

## 2020-01-13 RX ORDER — OXYCODONE HYDROCHLORIDE AND ACETAMINOPHEN 5; 325 MG/1; MG/1
2 TABLET ORAL PRN
Status: DISCONTINUED | OUTPATIENT
Start: 2020-01-13 | End: 2020-01-13 | Stop reason: HOSPADM

## 2020-01-13 RX ORDER — HYDRALAZINE HYDROCHLORIDE 20 MG/ML
5 INJECTION INTRAMUSCULAR; INTRAVENOUS EVERY 10 MIN PRN
Status: DISCONTINUED | OUTPATIENT
Start: 2020-01-13 | End: 2020-01-13 | Stop reason: HOSPADM

## 2020-01-13 RX ORDER — DIPHENHYDRAMINE HYDROCHLORIDE 50 MG/ML
12.5 INJECTION INTRAMUSCULAR; INTRAVENOUS
Status: DISCONTINUED | OUTPATIENT
Start: 2020-01-13 | End: 2020-01-13 | Stop reason: HOSPADM

## 2020-01-13 RX ORDER — MAGNESIUM SULFATE 1 G/100ML
1 INJECTION INTRAVENOUS PRN
Status: DISCONTINUED | OUTPATIENT
Start: 2020-01-13 | End: 2020-01-16 | Stop reason: HOSPADM

## 2020-01-13 RX ORDER — MORPHINE SULFATE 10 MG/ML
1 INJECTION, SOLUTION INTRAMUSCULAR; INTRAVENOUS EVERY 5 MIN PRN
Status: DISCONTINUED | OUTPATIENT
Start: 2020-01-13 | End: 2020-01-13 | Stop reason: HOSPADM

## 2020-01-13 RX ORDER — OXYCODONE HYDROCHLORIDE AND ACETAMINOPHEN 5; 325 MG/1; MG/1
2 TABLET ORAL EVERY 4 HOURS PRN
Status: DISCONTINUED | OUTPATIENT
Start: 2020-01-13 | End: 2020-01-16 | Stop reason: HOSPADM

## 2020-01-13 RX ADMIN — INSULIN GLARGINE 40 UNITS: 100 INJECTION, SOLUTION SUBCUTANEOUS at 20:25

## 2020-01-13 RX ADMIN — VITAMIN D 1000 UNITS: 25 TAB ORAL at 09:38

## 2020-01-13 RX ADMIN — ONDANSETRON 4 MG: 2 INJECTION, SOLUTION INTRAMUSCULAR; INTRAVENOUS at 16:56

## 2020-01-13 RX ADMIN — SODIUM CHLORIDE: 900 INJECTION, SOLUTION INTRAVENOUS at 21:00

## 2020-01-13 RX ADMIN — MAGNESIUM SULFATE HEPTAHYDRATE 1 G: 1 INJECTION, SOLUTION INTRAVENOUS at 06:51

## 2020-01-13 RX ADMIN — ASPIRIN 81 MG 81 MG: 81 TABLET ORAL at 09:38

## 2020-01-13 RX ADMIN — INSULIN LISPRO 6 UNITS: 100 INJECTION, SOLUTION INTRAVENOUS; SUBCUTANEOUS at 11:33

## 2020-01-13 RX ADMIN — HYDROCODONE BITARTRATE AND ACETAMINOPHEN 1 TABLET: 5; 325 TABLET ORAL at 09:40

## 2020-01-13 RX ADMIN — SODIUM CHLORIDE: 900 INJECTION, SOLUTION INTRAVENOUS at 13:43

## 2020-01-13 RX ADMIN — DOXAZOSIN 1 MG: 2 TABLET ORAL at 20:24

## 2020-01-13 RX ADMIN — GABAPENTIN 800 MG: 400 CAPSULE ORAL at 20:23

## 2020-01-13 RX ADMIN — INSULIN LISPRO 2 UNITS: 100 INJECTION, SOLUTION INTRAVENOUS; SUBCUTANEOUS at 20:25

## 2020-01-13 RX ADMIN — Medication 1.5 G: at 11:32

## 2020-01-13 RX ADMIN — Medication 1.5 G: at 22:31

## 2020-01-13 RX ADMIN — SIMVASTATIN 10 MG: 10 TABLET, FILM COATED ORAL at 20:24

## 2020-01-13 RX ADMIN — ACETAMINOPHEN 650 MG: 325 TABLET ORAL at 02:11

## 2020-01-13 RX ADMIN — MAGNESIUM SULFATE HEPTAHYDRATE 1 G: 1 INJECTION, SOLUTION INTRAVENOUS at 09:33

## 2020-01-13 RX ADMIN — OXYCODONE HYDROCHLORIDE AND ACETAMINOPHEN 2 TABLET: 5; 325 TABLET ORAL at 20:23

## 2020-01-13 RX ADMIN — HYDROCODONE BITARTRATE AND ACETAMINOPHEN 1 TABLET: 5; 325 TABLET ORAL at 13:42

## 2020-01-13 RX ADMIN — Medication 10 ML: at 09:47

## 2020-01-13 RX ADMIN — CLINDAMYCIN PHOSPHATE 900 MG: 900 INJECTION, SOLUTION INTRAVENOUS at 06:12

## 2020-01-13 RX ADMIN — MEROPENEM 1 G: 1 INJECTION, POWDER, FOR SOLUTION INTRAVENOUS at 09:37

## 2020-01-13 RX ADMIN — HYDROMORPHONE HYDROCHLORIDE 0.5 MG: 1 INJECTION, SOLUTION INTRAMUSCULAR; INTRAVENOUS; SUBCUTANEOUS at 17:56

## 2020-01-13 RX ADMIN — MEROPENEM 1 G: 1 INJECTION, POWDER, FOR SOLUTION INTRAVENOUS at 16:33

## 2020-01-13 RX ADMIN — PROPOFOL 120 MG: 10 INJECTION, EMULSION INTRAVENOUS at 16:56

## 2020-01-13 RX ADMIN — LISINOPRIL 10 MG: 10 TABLET ORAL at 09:38

## 2020-01-13 RX ADMIN — GABAPENTIN 800 MG: 400 CAPSULE ORAL at 09:38

## 2020-01-13 RX ADMIN — GABAPENTIN 800 MG: 400 CAPSULE ORAL at 13:42

## 2020-01-13 RX ADMIN — SODIUM CHLORIDE, POTASSIUM CHLORIDE, SODIUM LACTATE AND CALCIUM CHLORIDE: 600; 310; 30; 20 INJECTION, SOLUTION INTRAVENOUS at 16:56

## 2020-01-13 RX ADMIN — HYDROCODONE BITARTRATE AND ACETAMINOPHEN 1 TABLET: 5; 325 TABLET ORAL at 02:07

## 2020-01-13 RX ADMIN — Medication 10 ML: at 20:24

## 2020-01-13 ASSESSMENT — PULMONARY FUNCTION TESTS
PIF_VALUE: 11
PIF_VALUE: 1
PIF_VALUE: 1
PIF_VALUE: 12
PIF_VALUE: 11
PIF_VALUE: 11
PIF_VALUE: 19
PIF_VALUE: 11
PIF_VALUE: 0
PIF_VALUE: 11
PIF_VALUE: 1
PIF_VALUE: 11
PIF_VALUE: 1

## 2020-01-13 ASSESSMENT — PAIN DESCRIPTION - PAIN TYPE
TYPE: ACUTE PAIN

## 2020-01-13 ASSESSMENT — PAIN DESCRIPTION - ONSET
ONSET: ON-GOING
ONSET: ON-GOING

## 2020-01-13 ASSESSMENT — PAIN SCALES - GENERAL
PAINLEVEL_OUTOF10: 8
PAINLEVEL_OUTOF10: 8
PAINLEVEL_OUTOF10: 7
PAINLEVEL_OUTOF10: 10
PAINLEVEL_OUTOF10: 8
PAINLEVEL_OUTOF10: 0
PAINLEVEL_OUTOF10: 0

## 2020-01-13 ASSESSMENT — PAIN DESCRIPTION - ORIENTATION
ORIENTATION: LEFT

## 2020-01-13 ASSESSMENT — PAIN DESCRIPTION - PROGRESSION
CLINICAL_PROGRESSION: GRADUALLY WORSENING
CLINICAL_PROGRESSION: GRADUALLY WORSENING

## 2020-01-13 ASSESSMENT — PAIN DESCRIPTION - FREQUENCY
FREQUENCY: CONTINUOUS
FREQUENCY: CONTINUOUS

## 2020-01-13 ASSESSMENT — PAIN - FUNCTIONAL ASSESSMENT
PAIN_FUNCTIONAL_ASSESSMENT: 0-10
PAIN_FUNCTIONAL_ASSESSMENT: PREVENTS OR INTERFERES SOME ACTIVE ACTIVITIES AND ADLS
PAIN_FUNCTIONAL_ASSESSMENT: PREVENTS OR INTERFERES SOME ACTIVE ACTIVITIES AND ADLS

## 2020-01-13 ASSESSMENT — PAIN DESCRIPTION - DESCRIPTORS
DESCRIPTORS: ACHING
DESCRIPTORS: ACHING;DISCOMFORT
DESCRIPTORS: ACHING;DISCOMFORT

## 2020-01-13 ASSESSMENT — PAIN DESCRIPTION - LOCATION
LOCATION: FOOT
LOCATION: FOOT

## 2020-01-13 NOTE — PROGRESS NOTES
Transport here to take pt. Down via wheel chair to MRI. Pt. Stable when leaving the floor.  Delmy Barajas RN

## 2020-01-13 NOTE — CONSULTS
CONSULT    Admit Date:  1/11/2020    Subjective:  59 y.o. male who is seen for evaluation of cellulitis and ulcer on the left foot. Originally stepped on a nail through his shoe a couple weeks ago. Did OK at first and then redness, swelling and pain developed and he presented to the ER. States only slightly improved since being on IV antibiotics. Past Medical History:        Diagnosis Date    Arthritis     Diabetes mellitus ([address]     Hx of blood clots     Hyperlipidemia     Hypertension        Past Surgical History:        Procedure Laterality Date    ANKLE FRACTURE SURGERY Right 11/10/2019    ORIF right ankle    ANKLE FRACTURE SURGERY Right 11/10/2019    ANKLE OPEN REDUCTION INTERNAL FIXATION performed by Stevie Katz DO at [facility]         Current Medications:     aspirin  81 mg Oral Daily    clindamycin (CLEOCIN) IV  900 mg Intravenous Q8H    gabapentin  800 mg Oral TID    insulin glargine  40 Units Subcutaneous Nightly    lisinopril  10 mg Oral Daily    simvastatin  10 mg Oral Nightly    doxazosin  1 mg Oral Nightly    Vitamin D  1,000 Units Oral Daily    sodium chloride flush  10 mL Intravenous 2 times per day    enoxaparin  40 mg Subcutaneous Daily    insulin lispro  0-12 Units Subcutaneous TID WC    insulin lispro  0-6 Units Subcutaneous Nightly    influenza virus vaccine  0.5 mL Intramuscular Once       Allergies:  Keflex [cephalexin]    Social History:    Social History     Tobacco Use    Smoking status: Current Every Day Smoker     Packs/day: 1.00     Types: Cigarettes    Smokeless tobacco: Former User   Substance Use Topics    Alcohol use: No    Drug use: Yes     Types: Methamphetamines, Opiates , Marijuana     Comment: Last use 4 or 5 days ago       Family History:   History reviewed. No pertinent family history.     Review of Systems    CONSTITUTIONAL:  negative  EYES:  negative  HEENT:  negative  RESPIRATORY: negative  CARDIOVASCULAR:  negative  GASTROINTESTINAL:  negative  GENITOURINARY:  negative  INTEGUMENT/BREAST:  positive for ulcer  MUSCULOSKELETAL: positive for pain  NEUROLOGICAL:  positive for numbness      Objective:   BP (!) 155/82   Pulse 96   Temp 97.6 °F (36.4 °C) (Oral)   Resp 18   Ht 6' 1\" (1.854 m)   Wt 188 lb 3.2 oz (85.4 kg)   SpO2 96%   BMI 24.83 kg/m²     Data:  CBC:   Recent Labs     01/11/20 2230 01/12/20 0412 01/13/20  0507   WBC 8.6 6.5 4.7   HGB 13.6 12.2* 12.2*   HCT 42.2 36.7* 35.3*   MCV 89.9 88.4 86.9    180 163     BMP:   Recent Labs     01/11/20 2230 01/12/20  0409 01/13/20  0507   * 128* 129*   K 4.1 4.1 4.3   CL 88* 97* 95*   CO2 28 23 24   PHOS  --   --  3.0   BUN 10 9 10   CREATININE 0.8 0.7* 0.6*     LIVER PROFILE:   Recent Labs     01/11/20 2230 01/12/20  0409   AST 26 170*   ALT 42* 92*   BILITOT 0.3 0.3   ALKPHOS 125 192*     PT/INR:   Recent Labs     01/11/20 2230 01/12/20 0409 01/12/20  0412   PROT 7.3 5.5*  --    INR  --   --  1.06     HgBA1c:  Lab Results   Component Value Date    LABA1C 15.2 11/08/2019       Cultures: blood x2 - NGSF    Imaging: xray left foot -   There is a 2 mm radiopaque density within the plantar soft tissues of the   forefoot between the 3rd and 4th digit which may reflect a foreign body. There is mild soft tissue swelling in the forefoot. Calcaneal spur along the   plantar aspect. No acute fracture or dislocation. Joint spaces and   alignment otherwise maintained. No erosive changes or acute periostitis. Impression   2 mm radiopaque density within the plantar soft tissues of the forefoot   between the 3rd and 4th digit which may reflect a foreign body. Mild soft   tissue swelling without acute osseous abnormality.          Physical Exam:    General Appearance: alert and oriented to person, place and time, well developed and well- nourished, in no acute distress  Skin: warm and dry, no rash or erythema  Head: normocephalic and atraumatic  Eyes: pupils equal, round, and reactive to light, extraocular eye movements intact, conjunctivae normal  ENT: tympanic membrane, external ear and ear canal normal bilaterally, nose without deformity, nasal mucosa and turbinates normal without polyps  Neck: supple and non-tender without mass, no thyromegaly or thyroid nodules, no cervical lymphadenopathy  Pulmonary/Chest: clear to auscultation bilaterally- no wheezes, rales or rhonchi, normal air movement, no respiratory distress  Cardiovascular: normal rate, regular rhythm, normal S1 and S2, no murmurs, rubs, clicks, or gallops, distal pulses intact, no carotid bruits  Abdomen: soft, non-tender, non-distended, normal bowel sounds, no masses or organomegaly    DP/PT palpable left foot  Large area of erythema and edema encompassing forefoot and extending towards ankle region. Slightly improved based on comparison to images in EPIC. + increase in skin temperature noted. Ulcer noted on the dorsal and plantar aspect 4th ray with serosanguinous drainage noted. No malodor noted. No crepitus noted. Muscle strength 5/5 left LE. Assessment:  Patient Active Problem List   Diagnosis Code    Syncope and collapse R55    Thyroid nodule E04.1    Syncope R55    Closed fracture of right ankle S82.891A    Hyperglycemia R73.9    Methamphetamine abuse ([facility]) F15.10    Elevated d-dimer R79.89    Type 2 diabetes mellitus without complication, with long-term current use of insulin ([facility]) E11.9, Z79.4    Essential hypertension I10    Hyperlipidemia E78.5    Sepsis ([facility]) A41.9    Cellulitis of left foot L03.116     Cellulitis/abscess left foot  FB left foot  diabetic foot ulcer left secondary to peripheral neuropathy   diabetes mellitus uncontrolled      Plan  Patient examined. Reviewed labs and imaging. Continue IV antibiotics. Will obtain intraop cultures.    Ordered MRI to further evaluate for severity of underlying soft tissue infection and osteomyelitis. Discussed with patient the need for I&D at a minimum. Also discussed potential need for amputation of partial foot depending on imaging results and continued clinical improvement after I&D. Patient understands. Discussed risks, complications, alternatives and benefits with patient. Understands chance of nonhealing wound, infection, need for further surgery, loss of limb or life. Has been NPO. Plan for surgery this evening. Elevation of leg to decrease edema. Control glucose levels to prevent future complications. Thank you for allowing me to participate in the care of your patient.            Electronically signed by Cullen Cleaning DPM on 1/13/2020 at 7:37 AM.

## 2020-01-13 NOTE — CARE COORDINATION
Case Management Assessment  Initial Evaluation    Date/Time of Evaluation: 1/13/2020 12:55 PM  Assessment Completed by: Sherron Clemons    Patient Name: Carol Stoddard  YOB: 1955  Diagnosis: Severe sepsis ([facility] [address] [A41.9, R65.20]  Date / Time: 1/11/2020 10:15 PM  Admission status/Date:in-pt  Chart Reviewed: Yes      Patient Interviewed: Yes   Family Interviewed:  No      Hospitalization in the last 30 days:  No    Contacts  :     Relationship to Patient:   Phone Number:    Alternate Contact:     Relationship to Patient:     Phone Number:    Met with:    Current PCP  C/ [address] required for SNF : Y, N        3 night stay required - Y, N    ADLS  Support Systems: Spouse/Significant Other  Transportation: self    Meal Preparation: self    Housing  Home Environment: home with spouse,son  Steps: two  Plans to Return to Present Housing: Yes  Other Identified Issues: no    Home Care Information  Currently active with [address] : No  Type of Home Care Services: None  Passport/Waiver : No  :                      Phone Number:    Passport/Waiver Services: no          Durable Medical Equipment   DME Provider: n/a  Equipment: Walker___Cane_x__RTS___ BSC___Shower Chair___  02__ at ____Liter(s)---Uses________HHN___ CPAP___ BiPap___ Hospital Bed___W/C____Other________      Has Home O2 in place on admit:  No  Informed of need to bring portable home O2 tank on day of discharge for nursing to connect prior to leaving:   Not Indicated  Verbalized agreement/Understanding:   Not Indicated    Community Service Affiliation  Dialysis:  No    · Name:  · Location  · Dialysis Schedule:  · Phone:   · Fax: Outpatient PT/OT: No    Cancer Center: No     CHF Clinic: No     Pulmonary Rehab: No  Pain Clinic: No  Community Mental Health: No    Wound Clinic: No     Other: no    DISCHARGE PLAN: Reviewed Chart. Met with the pt. Role of dcp explained.  Pt from home with

## 2020-01-13 NOTE — FLOWSHEET NOTE
01/13/20 1849   Vital Signs   Temp 97.6 °F (36.4 °C)   Temp Source Oral   Pulse 78   Heart Rate Source Monitor   Resp 18   /76   Level of Consciousness 0   MEWS Score 1   Oxygen Therapy   SpO2 94 %   O2 Device Nasal cannula       Pt. Returned from surgery in stable condition. Pt. denies further needs at this time. Will continue to monitor. Call light is within reach.  Ivana Rosas RN Barium started for CT

## 2020-01-13 NOTE — PROGRESS NOTES
Arrived from OR with LMA in place and respirations unlabored. Dressing to left foot dry and intact. No c/o pain. Report received from GILL Bolaños RN

## 2020-01-13 NOTE — PROGRESS NOTES
900 mg Intravenous Q8H    gabapentin  800 mg Oral TID    insulin glargine  40 Units Subcutaneous Nightly    lisinopril  10 mg Oral Daily    simvastatin  10 mg Oral Nightly    doxazosin  1 mg Oral Nightly    Vitamin D  1,000 Units Oral Daily    sodium chloride flush  10 mL Intravenous 2 times per day    enoxaparin  40 mg Subcutaneous Daily    insulin lispro  0-12 Units Subcutaneous TID WC    insulin lispro  0-6 Units Subcutaneous Nightly    influenza virus vaccine  0.5 mL Intramuscular Once       Continuous Infusions:   dextrose      sodium chloride 100 mL/hr at 01/12/20 1438       PRN Meds:  potassium chloride **OR** potassium alternative oral replacement **OR** potassium chloride, magnesium sulfate, sodium chloride flush, glucose, dextrose, glucagon (rDNA), dextrose, acetaminophen, HYDROcodone 5 mg - acetaminophen      Data:  CBC:   Recent Labs     01/11/20 2230 01/12/20  0412 01/13/20  0507   WBC 8.6 6.5 4.7   HGB 13.6 12.2* 12.2*   HCT 42.2 36.7* 35.3*   MCV 89.9 88.4 86.9    180 163     BMP:   Recent Labs     01/11/20 2230 01/12/20  0409 01/13/20  0507   * 128* 129*   K 4.1 4.1 4.3   CL 88* 97* 95*   CO2 28 23 24   PHOS  --   --  3.0   BUN 10 9 10   CREATININE 0.8 0.7* 0.6*     LIVER PROFILE:   Recent Labs     01/11/20 2230 01/12/20  0409   AST 26 170*   ALT 42* 92*   BILITOT 0.3 0.3   ALKPHOS 125 192*     PT/INR:   Recent Labs     01/12/20 0412   PROTIME 12.3   INR 1.06     Cultures: pending      Assessment:  Principal Problem:    Sepsis ([facility] [address]  Active Problems:    Type 2 diabetes mellitus with hyperglycemia, with long-term current use of insulin (HCC)    Essential hypertension    Hyperlipidemia    Cellulitis of left foot    Type 2 diabetes mellitus with foot ulcer, with long-term current use of insulin (HCC)  Resolved Problems:    * No resolved hospital problems.  *      Plan:    Left foot cellulitis  Sepsis  -Secondary to patient with uncontrolled DM  stepping on a nail 2 weeks ago.   - X-rays does show foreign body  -Consult podiatry. Will likely need surgical debridement  -Continue IV antibiotics clindamycin.  -Patient has mild sepsis, with tachycardia, tachypnea, lactic acidosis,   which is now improved with IV hydration.  -We will also order Norco as needed for pain control     Diabetes mellitus, type 2, insulin-dependent  -Uncontrolled with severe hyperglycemia and skin complications.  -Blood sugars over 700 on presentation  -He was not compliant with his home medication  -Resume Lantus as at home , continue sliding scale insulin coverage. - continue IV fluid hydration monitor blood sugars closely.   blood sugars currently in the 300s     Hyponatremia  -This is pseudohyponatremia, secondary to hyperglycemia   -continue IV hydration ,  monitor sodium levels     Heart murmur  - recent  echocardiogram reviewed,  he has a sclerotic aortic valve     Hypertension  -  blood pressure stable     Hyperlipidemia   -on statins         Lovenox for DVT prophylaxis  DIET CARB CONTROL; Carb Control: 4 carb choices (60 gms)/meal   Full Code    Sulma Blanchard MD 1/13/2020 8:42 AM

## 2020-01-13 NOTE — PROGRESS NOTES
Pt. Back from MRI. Pt. denies further needs at this time. Call light is within reach.   Valentina Fontanez RN

## 2020-01-13 NOTE — ANESTHESIA PRE PROCEDURE
PRN Peter Martin MD        Or    potassium bicarb-citric acid (EFFER-K) effervescent tablet 40 mEq  40 mEq Oral PRN Peter Martin MD        Or    potassium chloride 10 mEq/100 mL IVPB (Peripheral Line)  10 mEq Intravenous PRN Peter Martin MD        magnesium sulfate 1 g in dextrose 5% 100 mL IVPB  1 g Intravenous PRN Peter Martin MD   Stopped at 01/13/20 1033    meropenem (MERREM) 1 g in sodium chloride 0.9 % 100 mL IVPB (mini-bag)  1 g Intravenous Q8H Jose Walls  mL/hr at 01/13/20 1633 1 g at 01/13/20 1633    vancomycin 1.5 g in dextrose 5% 300 mL IVPB  1,500 mg Intravenous Q12H Jose Walls MD   Stopped at 01/13/20 1335    aspirin chewable tablet 81 mg  81 mg Oral Daily Peter Martin MD   81 mg at 01/13/20 9873    gabapentin (NEURONTIN) capsule 800 mg  800 mg Oral TID Peter Martin MD   800 mg at 01/13/20 1342    insulin glargine (LANTUS) injection vial 40 Units  40 Units Subcutaneous Nightly Peter Martin MD   40 Units at 01/12/20 2024    lisinopril (PRINIVIL;ZESTRIL) tablet 10 mg  10 mg Oral Daily Peter Martin MD   10 mg at 01/13/20 2478    simvastatin (ZOCOR) tablet 10 mg  10 mg Oral Nightly Peter Martin MD   10 mg at 01/12/20 2021    doxazosin (CARDURA) tablet 1 mg  1 mg Oral Nightly Peter Martin MD   1 mg at 01/12/20 2021    Vitamin D (CHOLECALCIFEROL) tablet 1,000 Units  1,000 Units Oral Daily Peter Martin MD   1,000 Units at 01/13/20 5823    sodium chloride flush 0.9 % injection 10 mL  10 mL Intravenous 2 times per day Peter Martin MD   10 mL at 01/13/20 0947    sodium chloride flush 0.9 % injection 10 mL  10 mL Intravenous PRN Peter Martin MD        enoxaparin (LOVENOX) injection 40 mg  40 mg Subcutaneous Daily Peter Martin MD   Stopped at 01/13/20 1059    glucose (GLUTOSE) 40 % oral gel 15 g  15 g Oral PRN Peter Martin MD        dextrose 50 % IV solution  12.5 g Intravenous PRN Nada Headings Jacqueline Diaz MD        glucagon (rDNA) injection 1 mg  1 mg Intramuscular PRN Johnie Erazo MD        dextrose 5 % solution  100 mL/hr Intravenous PRN Johnie Erazo MD        0.9 % sodium chloride infusion   Intravenous Continuous Johnie Erazo  mL/hr at 01/13/20 1343      acetaminophen (TYLENOL) tablet 650 mg  650 mg Oral Q4H PRN Johnie Erazo MD   650 mg at 01/13/20 0211    insulin lispro (HUMALOG) injection vial 0-12 Units  0-12 Units Subcutaneous TID WC Johnie Erazo MD   6 Units at 01/13/20 1133    insulin lispro (HUMALOG) injection vial 0-6 Units  0-6 Units Subcutaneous Nightly Johnie Erazo MD   5 Units at 01/12/20 2025    influenza quadrivalent split vaccine (FLUZONE;FLUARIX;FLULAVAL;AFLURIA) injection 0.5 mL  0.5 mL Intramuscular Once Vijay Nick MD        HYDROcodone-acetaminophen [facility]) 5-325 MG per tablet 1 tablet  1 tablet Oral Q4H PRN Vijay Nick MD   1 tablet at 01/13/20 1342       Allergies: Allergies   Allergen Reactions    Keflex [Cephalexin] Swelling       Problem List:    Patient Active Problem List   Diagnosis Code    Syncope and collapse R55    Thyroid nodule E04.1    Syncope R55    Closed fracture of right ankle S82.891A    Severe hyperglycemia due to diabetes mellitus ([facility]) E11.65    Methamphetamine abuse ([facility]) F15.10    Elevated d-dimer R79.89    Type 2 diabetes mellitus with hyperglycemia, with long-term current use of insulin ([facility]) E11.65, Z79.4    Essential hypertension I10    Hyperlipidemia E78.5    Septicemia ([facility]) A41.9    Cellulitis of left foot L03. 80    Type 2 diabetes mellitus with foot ulcer, with long-term current use of insulin ([facility]) E11.621, L97.509, Z79.4    Cellulitis of toe of left foot L03.032       Past Medical History:        Diagnosis Date    Arthritis     Diabetes mellitus ([facility] [address]     Hx of blood clots     Hyperlipidemia     Hypertension     Type 2 diabetes mellitus with foot ulcer, with long-term current use of insulin [facility] 1/13/2020       Past Surgical History:        Procedure Laterality Date    ANKLE FRACTURE SURGERY Right 11/10/2019    ORIF right ankle    ANKLE FRACTURE SURGERY Right 11/10/2019    ANKLE OPEN REDUCTION INTERNAL FIXATION performed by Javier Bobby DO at [place] [address]         Social History:    Social History     Tobacco Use    Smoking status: Current Every Day Smoker     Packs/day: 1.00     Types: Cigarettes    Smokeless tobacco: Former User   Substance Use Topics    Alcohol use: No                                Ready to quit: No  Counseling given: Yes      Vital Signs (Current):   Vitals:    01/13/20 0200 01/13/20 0630 01/13/20 0935 01/13/20 1554   BP: (!) 155/82  118/72 119/75   Pulse: 96  82 76   Resp: 18  16 16   Temp: 102.4 °F (39.1 °C) 97.6 °F (36.4 °C) 97.9 °F (36.6 °C) 96.8 °F (36 °C)   TempSrc: Oral Oral Oral Oral   SpO2: 96%  96% 98%   Weight: 188 lb 3.2 oz (85.4 kg)      Height:                                                  BP Readings from Last 3 Encounters:   01/13/20 119/75   01/09/20 (!) 145/83   12/07/19 (!) 144/89       NPO Status:                                                                                 BMI:   Wt Readings from Last 3 Encounters:   01/13/20 188 lb 3.2 oz (85.4 kg)   01/08/20 174 lb (78.9 kg)   12/07/19 174 lb (78.9 kg)     Body mass index is 24.83 kg/m².     CBC:   Lab Results   Component Value Date    WBC 4.7 01/13/2020    RBC 4.06 01/13/2020    HGB 12.2 01/13/2020    HCT 35.3 01/13/2020    MCV 86.9 01/13/2020    RDW 13.7 01/13/2020     01/13/2020       CMP:   Lab Results   Component Value Date     01/13/2020    K 4.3 01/13/2020    K 4.1 01/12/2020    CL 95 01/13/2020    CO2 24 01/13/2020    BUN 10 01/13/2020    CREATININE 0.6 01/13/2020    GFRAA >60 01/13/2020    AGRATIO 1.4 01/12/2020    LABGLOM >60 01/13/2020    GLUCOSE 241 01/13/2020    PROT 5.5 01/12/2020    CALCIUM 7.7 01/13/2020    BILITOT Smoking status: Current Every Day Smoker     Packs/day: 1.00     Types: Cigarettes    Smokeless tobacco: Former User   Substance Use Topics    Alcohol use: No       LABS:    CBC  Lab Results   Component Value Date/Time    WBC 4.7 01/13/2020 05:07 AM    HGB 12.2 (L) 01/13/2020 05:07 AM    HCT 35.3 (L) 01/13/2020 05:07 AM     01/13/2020 05:07 AM     RENAL  Lab Results   Component Value Date/Time     (L) 01/13/2020 05:07 AM    K 4.3 01/13/2020 05:07 AM    K 4.1 01/12/2020 04:09 AM    CL 95 (L) 01/13/2020 05:07 AM    CO2 24 01/13/2020 05:07 AM    BUN 10 01/13/2020 05:07 AM    CREATININE 0.6 (L) 01/13/2020 05:07 AM    GLUCOSE 241 (H) 01/13/2020 05:07 AM     COAGS  Lab Results   Component Value Date/Time    PROTIME 12.3 01/12/2020 04:12 AM    INR 1.06 01/12/2020 04:12 AM       Sharda Salinas MD   1/13/2020

## 2020-01-13 NOTE — FLOWSHEET NOTE
01/13/20 0940   Pain Assessment   Pain Assessment 0-10   Pain Level 7   Pain Location Foot   Pain Orientation Left   Pain Type Acute pain   Pain Descriptors Aching;Discomfort   Pain Frequency Continuous   Pain Onset On-going   Clinical Progression Gradually worsening   Functional Pain Assessment Prevents or interferes some active activities and ADLs     Pain as shown above. PRN Norco given per [facility] order. Pt. Assessment completed- see flow sheet. Pt. denies further needs at this time. Will continue to monitor. Call light is within reach.   Satish Hernandez RN

## 2020-01-13 NOTE — PROGRESS NOTES
Spoke with Dr. Armando Vaughan and updated that pt. Angry at this time and wants to eat. States that he is going to eat if something doesn't happen soon. Dr. Armando Vaughan states that surgery is scheduled for 1700 and that if pt. Chooses to eat surgery will be pushed of. He also states that surgery tomorrow will be after 1700 and to let pt. Know. Pt. Updated. Pt. States he will wait until [id number] and if he is not in surgery he is leaving. Pt. Also states that he may change his mind and decide to leave before that. Pt. Made aware that leaving AMA would not benefit him. Pt. Agreeable to stay at this time.  Howie Kawasaki, RN

## 2020-01-14 LAB
ANION GAP SERPL CALCULATED.3IONS-SCNC: 11 MMOL/L (ref 3–16)
BUN BLDV-MCNC: 8 MG/DL (ref 7–20)
CALCIUM SERPL-MCNC: 7.8 MG/DL (ref 8.3–10.6)
CHLORIDE BLD-SCNC: 96 MMOL/L (ref 99–110)
CO2: 26 MMOL/L (ref 21–32)
CREAT SERPL-MCNC: 0.7 MG/DL (ref 0.8–1.3)
GFR AFRICAN AMERICAN: >60
GFR NON-AFRICAN AMERICAN: >60
GLUCOSE BLD-MCNC: 113 MG/DL (ref 70–99)
GLUCOSE BLD-MCNC: 131 MG/DL (ref 70–99)
GLUCOSE BLD-MCNC: 135 MG/DL (ref 70–99)
GLUCOSE BLD-MCNC: 254 MG/DL (ref 70–99)
GLUCOSE BLD-MCNC: 267 MG/DL (ref 70–99)
HCT VFR BLD CALC: 35.5 % (ref 40.5–52.5)
HEMOGLOBIN: 12.1 G/DL (ref 13.5–17.5)
MAGNESIUM: 1.8 MG/DL (ref 1.8–2.4)
MCH RBC QN AUTO: 29.6 PG (ref 26–34)
MCHC RBC AUTO-ENTMCNC: 34 G/DL (ref 31–36)
MCV RBC AUTO: 87.1 FL (ref 80–100)
PDW BLD-RTO: 13.7 % (ref 12.4–15.4)
PERFORMED ON: ABNORMAL
PHOSPHORUS: 3.8 MG/DL (ref 2.5–4.9)
PLATELET # BLD: 158 K/UL (ref 135–450)
PMV BLD AUTO: 8.7 FL (ref 5–10.5)
POTASSIUM SERPL-SCNC: 4.1 MMOL/L (ref 3.5–5.1)
RBC # BLD: 4.08 M/UL (ref 4.2–5.9)
SODIUM BLD-SCNC: 133 MMOL/L (ref 136–145)
WBC # BLD: 4.4 K/UL (ref 4–11)

## 2020-01-14 PROCEDURE — 36415 COLL VENOUS BLD VENIPUNCTURE: CPT

## 2020-01-14 PROCEDURE — 2580000003 HC RX 258: Performed by: PODIATRIST

## 2020-01-14 PROCEDURE — 83735 ASSAY OF MAGNESIUM: CPT

## 2020-01-14 PROCEDURE — 6360000002 HC RX W HCPCS: Performed by: PODIATRIST

## 2020-01-14 PROCEDURE — 1200000000 HC SEMI PRIVATE

## 2020-01-14 PROCEDURE — 80048 BASIC METABOLIC PNL TOTAL CA: CPT

## 2020-01-14 PROCEDURE — G0008 ADMIN INFLUENZA VIRUS VAC: HCPCS | Performed by: PODIATRIST

## 2020-01-14 PROCEDURE — 85027 COMPLETE CBC AUTOMATED: CPT

## 2020-01-14 PROCEDURE — 90686 IIV4 VACC NO PRSV 0.5 ML IM: CPT | Performed by: PODIATRIST

## 2020-01-14 PROCEDURE — 6370000000 HC RX 637 (ALT 250 FOR IP): Performed by: PODIATRIST

## 2020-01-14 PROCEDURE — 84100 ASSAY OF PHOSPHORUS: CPT

## 2020-01-14 PROCEDURE — 99232 SBSQ HOSP IP/OBS MODERATE 35: CPT | Performed by: INTERNAL MEDICINE

## 2020-01-14 RX ADMIN — MEROPENEM 1 G: 1 INJECTION, POWDER, FOR SOLUTION INTRAVENOUS at 01:18

## 2020-01-14 RX ADMIN — MEROPENEM 1 G: 1 INJECTION, POWDER, FOR SOLUTION INTRAVENOUS at 17:17

## 2020-01-14 RX ADMIN — DOXAZOSIN 1 MG: 2 TABLET ORAL at 19:57

## 2020-01-14 RX ADMIN — ASPIRIN 81 MG 81 MG: 81 TABLET ORAL at 08:23

## 2020-01-14 RX ADMIN — INSULIN LISPRO 6 UNITS: 100 INJECTION, SOLUTION INTRAVENOUS; SUBCUTANEOUS at 17:18

## 2020-01-14 RX ADMIN — OXYCODONE HYDROCHLORIDE AND ACETAMINOPHEN 2 TABLET: 5; 325 TABLET ORAL at 11:47

## 2020-01-14 RX ADMIN — OXYCODONE HYDROCHLORIDE AND ACETAMINOPHEN 2 TABLET: 5; 325 TABLET ORAL at 07:24

## 2020-01-14 RX ADMIN — GABAPENTIN 800 MG: 400 CAPSULE ORAL at 08:23

## 2020-01-14 RX ADMIN — Medication 10 ML: at 19:57

## 2020-01-14 RX ADMIN — OXYCODONE HYDROCHLORIDE AND ACETAMINOPHEN 2 TABLET: 5; 325 TABLET ORAL at 17:17

## 2020-01-14 RX ADMIN — INSULIN GLARGINE 40 UNITS: 100 INJECTION, SOLUTION SUBCUTANEOUS at 20:03

## 2020-01-14 RX ADMIN — GABAPENTIN 800 MG: 400 CAPSULE ORAL at 19:57

## 2020-01-14 RX ADMIN — SODIUM CHLORIDE: 900 INJECTION, SOLUTION INTRAVENOUS at 08:24

## 2020-01-14 RX ADMIN — SODIUM CHLORIDE: 900 INJECTION, SOLUTION INTRAVENOUS at 22:27

## 2020-01-14 RX ADMIN — GABAPENTIN 800 MG: 400 CAPSULE ORAL at 14:49

## 2020-01-14 RX ADMIN — Medication 1.5 G: at 12:00

## 2020-01-14 RX ADMIN — OXYCODONE HYDROCHLORIDE AND ACETAMINOPHEN 2 TABLET: 5; 325 TABLET ORAL at 22:49

## 2020-01-14 RX ADMIN — INFLUENZA A VIRUS A/BRISBANE/02/2018 IVR-190 (H1N1) ANTIGEN (PROPIOLACTONE INACTIVATED), INFLUENZA A VIRUS A/KANSAS/14/2017 X-327 (H3N2) ANTIGEN (PROPIOLACTONE INACTIVATED), INFLUENZA B VIRUS B/MARYLAND/15/2016 ANTIGEN (PROPIOLACTONE INACTIVATED), INFLUENZA B VIRUS B/PHUKET/3073/2013 BVR-1B ANTIGEN (PROPIOLACTONE INACTIVATED) 0.5 ML: 15; 15; 15; 15 INJECTION, SUSPENSION INTRAMUSCULAR at 08:23

## 2020-01-14 RX ADMIN — ENOXAPARIN SODIUM 40 MG: 40 INJECTION SUBCUTANEOUS at 08:25

## 2020-01-14 RX ADMIN — SIMVASTATIN 10 MG: 10 TABLET, FILM COATED ORAL at 19:57

## 2020-01-14 RX ADMIN — OXYCODONE HYDROCHLORIDE AND ACETAMINOPHEN 2 TABLET: 5; 325 TABLET ORAL at 01:37

## 2020-01-14 RX ADMIN — Medication 1.5 G: at 22:30

## 2020-01-14 RX ADMIN — VITAMIN D 1000 UNITS: 25 TAB ORAL at 08:23

## 2020-01-14 RX ADMIN — LISINOPRIL 10 MG: 10 TABLET ORAL at 08:23

## 2020-01-14 RX ADMIN — INSULIN LISPRO 3 UNITS: 100 INJECTION, SOLUTION INTRAVENOUS; SUBCUTANEOUS at 19:58

## 2020-01-14 RX ADMIN — MEROPENEM 1 G: 1 INJECTION, POWDER, FOR SOLUTION INTRAVENOUS at 08:24

## 2020-01-14 ASSESSMENT — PAIN DESCRIPTION - FREQUENCY
FREQUENCY: CONTINUOUS
FREQUENCY: CONTINUOUS

## 2020-01-14 ASSESSMENT — PAIN - FUNCTIONAL ASSESSMENT
PAIN_FUNCTIONAL_ASSESSMENT: PREVENTS OR INTERFERES SOME ACTIVE ACTIVITIES AND ADLS
PAIN_FUNCTIONAL_ASSESSMENT: PREVENTS OR INTERFERES SOME ACTIVE ACTIVITIES AND ADLS

## 2020-01-14 ASSESSMENT — PAIN DESCRIPTION - DESCRIPTORS
DESCRIPTORS: STABBING;SHOOTING
DESCRIPTORS: ACHING;DISCOMFORT;SORE
DESCRIPTORS: ACHING
DESCRIPTORS: ACHING

## 2020-01-14 ASSESSMENT — PAIN SCALES - GENERAL
PAINLEVEL_OUTOF10: 8
PAINLEVEL_OUTOF10: 10
PAINLEVEL_OUTOF10: 8
PAINLEVEL_OUTOF10: 7
PAINLEVEL_OUTOF10: 8

## 2020-01-14 ASSESSMENT — PAIN DESCRIPTION - PAIN TYPE
TYPE: ACUTE PAIN

## 2020-01-14 ASSESSMENT — PAIN DESCRIPTION - PROGRESSION
CLINICAL_PROGRESSION: GRADUALLY WORSENING
CLINICAL_PROGRESSION: GRADUALLY WORSENING

## 2020-01-14 ASSESSMENT — PAIN DESCRIPTION - LOCATION
LOCATION: FOOT

## 2020-01-14 ASSESSMENT — PAIN DESCRIPTION - ORIENTATION
ORIENTATION: LEFT

## 2020-01-14 ASSESSMENT — PAIN DESCRIPTION - ONSET
ONSET: ON-GOING
ONSET: ON-GOING

## 2020-01-14 NOTE — PROGRESS NOTES
Vancomycin Day: 2    Patient's labs, cultures, vitals, and vancomycin regimen reviewed. No changes today.   Trough due on 1/15 at [address] [employer] 1/14/202011:36 AM  .

## 2020-01-14 NOTE — FLOWSHEET NOTE
01/14/20 1717   Pain Assessment   Pain Assessment 0-10   Pain Level 7   Pain Location Foot   Pain Orientation Left   Pain Type Acute pain   Pain Descriptors Aching   Pain Frequency Continuous   Pain Onset On-going   Clinical Progression Gradually worsening   Functional Pain Assessment Prevents or interferes some active activities and ADLs     Pain as above. PRN Pain mediation given per [facility] order. Pt. denies further needs at this time. Will continue to monitor. Call light is within reach.   Brooklyn Banks RN

## 2020-01-14 NOTE — PROGRESS NOTES
4 x 1.5 x 1 cm. Diffuse cellulitis. 2. Very mild marrow edema in the 3rd proximal phalanx with normal T1 signal   compatible with noninfectious reactive osteitis. Osteomyelitis is unlikely. XR FOOT LEFT (MIN 3 VIEWS)   Final Result   2 mm radiopaque density within the plantar soft tissues of the forefoot   between the 3rd and 4th digit which may reflect a foreign body. Mild soft   tissue swelling without acute osseous abnormality. Assessment:  Principal Problem:    Septicemia ([facility] [address]  Active Problems:    Severe hyperglycemia due to diabetes mellitus ([address]    Type 2 diabetes mellitus with hyperglycemia, with long-term current use of insulin (HCC)    Essential hypertension    Hyperlipidemia    Cellulitis of left foot    Type 2 diabetes mellitus with foot ulcer, with long-term current use of insulin ([facility])    Cellulitis of toe of left foot  Resolved Problems:    * No resolved hospital problems. *      Plan:    Left foot cellulitis  Sepsis  -Secondary to patient with uncontrolled DM  stepping on a nail 2 weeks ago. - X-rays does show foreign body  -Consult podiatry. Will likely need surgical debridement  -Continue IV antibiotics clindamycin.  -Patient has mild sepsis, with tachycardia, tachypnea, lactic acidosis,   which is now improved with IV hydration.  -Patient went to the operating room yesterday. I&D was done.     Diabetes mellitus, type 2, insulin-dependent. Sugars are coming down with treatment.  -Uncontrolled with severe hyperglycemia and skin complications.  -Blood sugars over 700 on presentation  -He was not compliant with his home medication  -Resume Lantus as at home , continue sliding scale insulin coverage.   - continue IV fluid hydration monitor blood sugars closely.      Hyponatremia  -This is pseudohyponatremia, secondary to hyperglycemia   -continue IV hydration ,  monitor sodium levels     Heart murmur  - recent  echocardiogram reviewed,  he has a sclerotic aortic

## 2020-01-14 NOTE — PROGRESS NOTES
Bedside report given to [facility]. Pt. denies further needs at this time. Call light is within reach.   Amy Villafana RN

## 2020-01-14 NOTE — PROGRESS NOTES
Call from [facility] lab. Pt. + MRSA. Perfect serve sent to Dr. Arash Junior and read by him at [phone].  Ivana Rosas RN

## 2020-01-15 LAB
ANION GAP SERPL CALCULATED.3IONS-SCNC: 11 MMOL/L (ref 3–16)
BUN BLDV-MCNC: 9 MG/DL (ref 7–20)
CALCIUM SERPL-MCNC: 8 MG/DL (ref 8.3–10.6)
CHLORIDE BLD-SCNC: 97 MMOL/L (ref 99–110)
CO2: 28 MMOL/L (ref 21–32)
CREAT SERPL-MCNC: 0.7 MG/DL (ref 0.8–1.3)
GFR AFRICAN AMERICAN: >60
GFR NON-AFRICAN AMERICAN: >60
GLUCOSE BLD-MCNC: 134 MG/DL (ref 70–99)
GLUCOSE BLD-MCNC: 151 MG/DL (ref 70–99)
GLUCOSE BLD-MCNC: 239 MG/DL (ref 70–99)
GLUCOSE BLD-MCNC: 346 MG/DL (ref 70–99)
GLUCOSE BLD-MCNC: 357 MG/DL (ref 70–99)
GLUCOSE BLD-MCNC: 98 MG/DL (ref 70–99)
HCT VFR BLD CALC: 36.4 % (ref 40.5–52.5)
HEMOGLOBIN: 12.2 G/DL (ref 13.5–17.5)
MAGNESIUM: 1.7 MG/DL (ref 1.8–2.4)
MCH RBC QN AUTO: 29.5 PG (ref 26–34)
MCHC RBC AUTO-ENTMCNC: 33.5 G/DL (ref 31–36)
MCV RBC AUTO: 88.2 FL (ref 80–100)
PDW BLD-RTO: 13.9 % (ref 12.4–15.4)
PERFORMED ON: ABNORMAL
PERFORMED ON: NORMAL
PHOSPHORUS: 2.9 MG/DL (ref 2.5–4.9)
PLATELET # BLD: 165 K/UL (ref 135–450)
PMV BLD AUTO: 8.7 FL (ref 5–10.5)
POTASSIUM SERPL-SCNC: 4.3 MMOL/L (ref 3.5–5.1)
RBC # BLD: 4.13 M/UL (ref 4.2–5.9)
SODIUM BLD-SCNC: 136 MMOL/L (ref 136–145)
VANCOMYCIN TROUGH: 11.3 UG/ML (ref 10–20)
WBC # BLD: 3.4 K/UL (ref 4–11)

## 2020-01-15 PROCEDURE — 85027 COMPLETE CBC AUTOMATED: CPT

## 2020-01-15 PROCEDURE — 6370000000 HC RX 637 (ALT 250 FOR IP): Performed by: PODIATRIST

## 2020-01-15 PROCEDURE — 97165 OT EVAL LOW COMPLEX 30 MIN: CPT

## 2020-01-15 PROCEDURE — 97530 THERAPEUTIC ACTIVITIES: CPT

## 2020-01-15 PROCEDURE — 2580000003 HC RX 258: Performed by: PODIATRIST

## 2020-01-15 PROCEDURE — 6360000002 HC RX W HCPCS: Performed by: PODIATRIST

## 2020-01-15 PROCEDURE — 80202 ASSAY OF VANCOMYCIN: CPT

## 2020-01-15 PROCEDURE — 36415 COLL VENOUS BLD VENIPUNCTURE: CPT

## 2020-01-15 PROCEDURE — 97161 PT EVAL LOW COMPLEX 20 MIN: CPT

## 2020-01-15 PROCEDURE — 84100 ASSAY OF PHOSPHORUS: CPT

## 2020-01-15 PROCEDURE — 1200000000 HC SEMI PRIVATE

## 2020-01-15 PROCEDURE — 80048 BASIC METABOLIC PNL TOTAL CA: CPT

## 2020-01-15 PROCEDURE — 83735 ASSAY OF MAGNESIUM: CPT

## 2020-01-15 RX ADMIN — OXYCODONE HYDROCHLORIDE AND ACETAMINOPHEN 2 TABLET: 5; 325 TABLET ORAL at 19:31

## 2020-01-15 RX ADMIN — GABAPENTIN 800 MG: 400 CAPSULE ORAL at 21:10

## 2020-01-15 RX ADMIN — OXYCODONE HYDROCHLORIDE AND ACETAMINOPHEN 2 TABLET: 5; 325 TABLET ORAL at 23:30

## 2020-01-15 RX ADMIN — ENOXAPARIN SODIUM 40 MG: 40 INJECTION SUBCUTANEOUS at 09:31

## 2020-01-15 RX ADMIN — GABAPENTIN 800 MG: 400 CAPSULE ORAL at 09:31

## 2020-01-15 RX ADMIN — Medication 1.5 G: at 11:42

## 2020-01-15 RX ADMIN — SODIUM CHLORIDE: 900 INJECTION, SOLUTION INTRAVENOUS at 09:32

## 2020-01-15 RX ADMIN — SIMVASTATIN 10 MG: 10 TABLET, FILM COATED ORAL at 21:10

## 2020-01-15 RX ADMIN — Medication 10 ML: at 21:10

## 2020-01-15 RX ADMIN — INSULIN LISPRO 5 UNITS: 100 INJECTION, SOLUTION INTRAVENOUS; SUBCUTANEOUS at 21:10

## 2020-01-15 RX ADMIN — ASPIRIN 81 MG 81 MG: 81 TABLET ORAL at 09:31

## 2020-01-15 RX ADMIN — DOXAZOSIN 1 MG: 2 TABLET ORAL at 21:09

## 2020-01-15 RX ADMIN — LISINOPRIL 10 MG: 10 TABLET ORAL at 09:31

## 2020-01-15 RX ADMIN — INSULIN GLARGINE 40 UNITS: 100 INJECTION, SOLUTION SUBCUTANEOUS at 21:11

## 2020-01-15 RX ADMIN — Medication 1.5 G: at 23:30

## 2020-01-15 RX ADMIN — OXYCODONE HYDROCHLORIDE AND ACETAMINOPHEN 2 TABLET: 5; 325 TABLET ORAL at 15:09

## 2020-01-15 RX ADMIN — INSULIN LISPRO 4 UNITS: 100 INJECTION, SOLUTION INTRAVENOUS; SUBCUTANEOUS at 12:25

## 2020-01-15 RX ADMIN — Medication 10 ML: at 09:31

## 2020-01-15 RX ADMIN — MEROPENEM 1 G: 1 INJECTION, POWDER, FOR SOLUTION INTRAVENOUS at 09:31

## 2020-01-15 RX ADMIN — MEROPENEM 1 G: 1 INJECTION, POWDER, FOR SOLUTION INTRAVENOUS at 17:52

## 2020-01-15 RX ADMIN — MEROPENEM 1 G: 1 INJECTION, POWDER, FOR SOLUTION INTRAVENOUS at 00:49

## 2020-01-15 RX ADMIN — OXYCODONE HYDROCHLORIDE AND ACETAMINOPHEN 2 TABLET: 5; 325 TABLET ORAL at 03:45

## 2020-01-15 RX ADMIN — OXYCODONE HYDROCHLORIDE AND ACETAMINOPHEN 2 TABLET: 5; 325 TABLET ORAL at 09:44

## 2020-01-15 RX ADMIN — INSULIN LISPRO 8 UNITS: 100 INJECTION, SOLUTION INTRAVENOUS; SUBCUTANEOUS at 16:05

## 2020-01-15 RX ADMIN — VITAMIN D 1000 UNITS: 25 TAB ORAL at 09:31

## 2020-01-15 ASSESSMENT — PAIN DESCRIPTION - PAIN TYPE
TYPE: ACUTE PAIN
TYPE: CHRONIC PAIN

## 2020-01-15 ASSESSMENT — PAIN DESCRIPTION - ORIENTATION
ORIENTATION: LEFT

## 2020-01-15 ASSESSMENT — PAIN DESCRIPTION - ONSET
ONSET: ON-GOING

## 2020-01-15 ASSESSMENT — PAIN DESCRIPTION - PROGRESSION
CLINICAL_PROGRESSION: GRADUALLY WORSENING

## 2020-01-15 ASSESSMENT — PAIN DESCRIPTION - LOCATION
LOCATION: FOOT

## 2020-01-15 ASSESSMENT — PAIN DESCRIPTION - DESCRIPTORS
DESCRIPTORS: DISCOMFORT;PRESSURE
DESCRIPTORS: CONSTANT
DESCRIPTORS: STABBING
DESCRIPTORS: DISCOMFORT;PRESSURE

## 2020-01-15 ASSESSMENT — PAIN SCALES - GENERAL
PAINLEVEL_OUTOF10: 8
PAINLEVEL_OUTOF10: 3
PAINLEVEL_OUTOF10: 7

## 2020-01-15 ASSESSMENT — PAIN - FUNCTIONAL ASSESSMENT
PAIN_FUNCTIONAL_ASSESSMENT: PREVENTS OR INTERFERES WITH MANY ACTIVE NOT PASSIVE ACTIVITIES
PAIN_FUNCTIONAL_ASSESSMENT: PREVENTS OR INTERFERES WITH MANY ACTIVE NOT PASSIVE ACTIVITIES
PAIN_FUNCTIONAL_ASSESSMENT: PREVENTS OR INTERFERES SOME ACTIVE ACTIVITIES AND ADLS

## 2020-01-15 ASSESSMENT — PAIN DESCRIPTION - FREQUENCY
FREQUENCY: INTERMITTENT

## 2020-01-15 NOTE — PROGRESS NOTES
History reviewed. No pertinent family history. Review of Systems    CONSTITUTIONAL:  negative  EYES:  negative  HEENT:  negative  RESPIRATORY:  negative  CARDIOVASCULAR:  negative  GASTROINTESTINAL:  negative  GENITOURINARY:  negative  INTEGUMENT/BREAST:  positive for ulcer  MUSCULOSKELETAL: positive for pain  NEUROLOGICAL:  positive for numbness      Objective:   BP (!) 117/58   Pulse 84   Temp 100.2 °F (37.9 °C) (Oral)   Resp 18   Ht 6' 1\" (1.854 m)   Wt 186 lb 6.4 oz (84.6 kg)   SpO2 95%   BMI 24.59 kg/m²     Data:  CBC:   Recent Labs     01/13/20  0507 01/14/20  0550 01/15/20  0423   WBC 4.7 4.4 3.4*   HGB 12.2* 12.1* 12.2*   HCT 35.3* 35.5* 36.4*   MCV 86.9 87.1 88.2    158 165     BMP:   Recent Labs     01/13/20  0507 01/14/20  0550 01/15/20  0423   * 133* 136   K 4.3 4.1 4.3   CL 95* 96* 97*   CO2 24 26 28   PHOS 3.0 3.8 2.9   BUN 10 8 9   CREATININE 0.6* 0.7* 0.7*     LIVER PROFILE:   No results for input(s): AST, ALT, LIPASE, BILIDIR, BILITOT, ALKPHOS in the last 72 hours. Invalid input(s): AMYLASE,  ALB  PT/INR:   No results for input(s): PROT, INR in the last 72 hours. HgBA1c:  Lab Results   Component Value Date    LABA1C 13.8 01/12/2020       Cultures: blood x2 - NGSF  Wound -   Gram Stain Result 2+ WBC's (Polymorphonuclear)   3+ RBC's   2+ Gram positive cocci   Abnormal Anaerobic Culture Further report to follow Organism Staph aureus MRSAAbnormal WOUND/ABSCESS --Abnormal Light growth   PBP2= POSITIVE   Sensitivity to follow   CONTACT PRECAUTIONS INDICATED   Abnormal Organism Klebsiella pneumoniaeAbnormal WOUND/ABSCESS -- Rare growth   Sensitivity to follow       Imaging: xray left foot -   There is a 2 mm radiopaque density within the plantar soft tissues of the   forefoot between the 3rd and 4th digit which may reflect a foreign body. There is mild soft tissue swelling in the forefoot. Calcaneal spur along the   plantar aspect. No acute fracture or dislocation.   Joint

## 2020-01-15 NOTE — PROGRESS NOTES
PROGRESS NOTE    Admit Date:  1/11/2020    Subjective:  59 y.o. male who is seen for evaluation of cellulitis and ulcer on the left foot. S/P debridement 1/13/20. States foot is painful. Does feel a little better overall. Past Medical History:        Diagnosis Date    Arthritis     Diabetes mellitus ([facility]     Hx of blood clots     Hyperlipidemia     Hypertension     Type 2 diabetes mellitus with foot ulcer, with long-term current use of insulin ([facility] 1/13/2020       Past Surgical History:        Procedure Laterality Date    ANKLE FRACTURE SURGERY Right 11/10/2019    ORIF right ankle    ANKLE FRACTURE SURGERY Right 11/10/2019    ANKLE OPEN REDUCTION INTERNAL FIXATION performed by Colten Estevez DO at [address] OTHER SURGICAL HISTORY  01/13/2020    incision and debridement left foot       Current Medications:     meropenem (MERREM) 1 g IVPB (mini-bag)  1 g Intravenous Q8H    vancomycin  1,500 mg Intravenous Q12H    aspirin  81 mg Oral Daily    gabapentin  800 mg Oral TID    insulin glargine  40 Units Subcutaneous Nightly    lisinopril  10 mg Oral Daily    simvastatin  10 mg Oral Nightly    doxazosin  1 mg Oral Nightly    Vitamin D  1,000 Units Oral Daily    sodium chloride flush  10 mL Intravenous 2 times per day    enoxaparin  40 mg Subcutaneous Daily    insulin lispro  0-12 Units Subcutaneous TID WC    insulin lispro  0-6 Units Subcutaneous Nightly       Allergies:  Keflex [cephalexin]    Social History:    Social History     Tobacco Use    Smoking status: Current Every Day Smoker     Packs/day: 1.00     Types: Cigarettes    Smokeless tobacco: Former User   Substance Use Topics    Alcohol use: No    Drug use: Yes     Types: Methamphetamines, Opiates , Marijuana     Comment: Last use 4 or 5 days ago       Family History:   History reviewed. No pertinent family history.     Review of Systems    CONSTITUTIONAL:  negative  EYES:

## 2020-01-15 NOTE — PROGRESS NOTES
Inpatient Occupational Therapy  Evaluation and Treatment    Unit: PCU  Date:  1/15/2020  Patient Name:    Carol Stoddard  Admitting diagnosis:  Severe sepsis ([facility] [A41.9, R65.20]  Admit Date:  1/11/2020  Precautions/Restrictions/WB Status/ Lines/ Wounds/ Oxygen:fall risk, IV, bed/chair alarm, WB restrictions (NWB RLE, heel WB L foot in surgical shoe) , telemetry and contact precautions   S/p I&D L foot 1/13/2020 - reports he is to be heel WB L foot  S/p R lateral malleolus ORIF 11/10/2019 (Past ortho appt 12/3/19 - Pt to be NWB in [employer] Walking Boot)    Treatment Time:  14:22-14:47  Treatment Number: 1   Billable Treatment Time: 15 minutes   Total Treatment Time:   25  minutes    Patient Goals for Therapy:  \"possibly to go to rehab  \"      Discharge Recommendations: Acute Rehab (ARU)/ Inpatient Rehab Facility (IRF)  DME needs for discharge: defer to facility       Therapy recommendations for staff:   Assist of 1 (contact guard assist) with use of standard walker (SW) for all transfers to/from BSC/chair      Home Health S4 Level Recommendation:  NA  AM-PAC Score: 16    Preadmission Environment    Pt. Lives with family (ex-wife)  Home environment:    one story home  Steps to enter first floor: 2 steps to enter and no Rail  Bathroom: Tub/Shower unit and Shower Chair , hand held shower  Equipment owned: [facility] and crutches     Preadmission Status:  Pt. Able to drive: Yes  Pt Fully independent with ADLs: Yes  Pt. Required assistance from family for: Cleaning, Cooking and Laundry   Pt. Fully independent for transfers and gait and walked with crutches and intermittent cane use   History of falls No     Pain   Yes  Location: L foot 6-7/10                          R ankle 6-7/10  Pain Medicine Status: RN notified     Cognition    A&O x4   Able to follow 2 step commands  Subjective  Patient lying supine in bed with no family present  Pt agreeable to this OT eval & tx.      Upper Extremity ROM:    WFL,  pt able to goals include: PLOF and Pt cooperative  Barriers to achieving goals include:  Pain      Plan: To be seen 3-5 x/wk while in acute care setting for therapeutic exercises, bed mobility, transfers, dressing, bathing, family/patient education with adaptive equipment, breathing technique instruction.      Marc Finney OTR/L #[id number]            If patient discharges from this facility prior to next visit, this note will serve as the Discharge Summary

## 2020-01-15 NOTE — FLOWSHEET NOTE
01/14/20 1945   Vital Signs   Temp 97.3 °F (36.3 °C)   Temp Source Oral   Pulse 68   Heart Rate Source Monitor   Resp 16   /75   Level of Consciousness 0   MEWS Score 1   Oxygen Therapy   SpO2 96 %   O2 Device None (Room air)   Pt. Resting in bed. Call light in reach. Shift assessment completed see flow sheet. Denies any needs at this time. Will continue to monitor.

## 2020-01-15 NOTE — PROGRESS NOTES
underlying abscess versus phlegmon measuring   approximately 4 x 1.5 x 1 cm. Diffuse cellulitis. 2. Very mild marrow edema in the 3rd proximal phalanx with normal T1 signal   compatible with noninfectious reactive osteitis. Osteomyelitis is unlikely. XR FOOT LEFT (MIN 3 VIEWS)   Final Result   2 mm radiopaque density within the plantar soft tissues of the forefoot   between the 3rd and 4th digit which may reflect a foreign body. Mild soft   tissue swelling without acute osseous abnormality. Assessment:  Principal Problem:    Septicemia ([address]  Active Problems:    Cellulitis of left foot    Severe hyperglycemia due to diabetes mellitus ([address]    Type 2 diabetes mellitus with hyperglycemia, with long-term current use of insulin ([address]    Essential hypertension    Hyperlipidemia    Type 2 diabetes mellitus with foot ulcer, with long-term current use of insulin (HCC)    Cellulitis of toe of left foot  Resolved Problems:    * No resolved hospital problems. *      Plan:    Left foot cellulitis  Sepsis  -Secondary to patient with uncontrolled DM  stepping on a nail 2 weeks ago. - X-rays does show foreign body  -Consulted podiatry. S/P I and D 1/13  -Continue IV antibiotics  - > on Vanc and Merrem   -Patient has mild sepsis, with tachycardia, tachypnea, lactic acidosis,  which is now improved with IV hydration.       Diabetes mellitus, type 2, insulin-dependent - > blood sugars are improving   - was uncontrolled with severe hyperglycemia and skin complications.  -Blood sugars over 700 on presentation  -He was not compliant with his home medication  -Resumed Lantus as at home , continue sliding scale insulin coverage.   - continue IV fluid hydration,  monitor blood sugars closely.      Hyponatremia  -This is pseudohyponatremia, secondary to hyperglycemia   -continue IV hydration ,  monitor sodium levels     Heart murmur  - recent  echocardiogram reviewed,  he has a sclerotic aortic valve     Hypertension  -  blood pressure stable     Hyperlipidemia   -on statins       Plan   -May go to [address] to reevaluate wound in 2 days   -PT, OT consult .  Pt considering SNF      Lovenox for DVT prophylaxis  DIET CARB CONTROL; Carb Control: 4 carb choices (60 gms)/meal   Full Code          Tevin Gabriel MD 1/15/2020 1:58 PM

## 2020-01-15 NOTE — PROGRESS NOTES
PT, POC, importance of continued activity, DC recommendations, safety awareness, transfer techniques and calling for assist with mobility. Assessment  Pt seen for Physical Therapy evaluation in acute care setting. Pt demonstrated decreased Activity tolerance, Balance, ROM, Safety and Strength and decreased independence with Ambulation, Bed Mobility  and Transfers. Pt requiring CGA and unable to stand > 2 min prior to requiring seated rest. Pt with increased difficulty in mobility 2/2 to recent I&D of L foot. Pt would benefit from intensive inpatient rehab to safely progress his IND with functional mobility. Goals : To be met in 3 visits:  1). Independent with LE Ex x 10 reps    To be met in 6 visits:  1). Supine to/from sit: Independent  2). Sit to/from stand: Independent  3). Bed to chair: Supervision  4). Gait: Ambulate 50 ft.  with  Supervision  and use of LRAD  5). Tolerate B LE exercises 3 sets of 10-15 reps  6). Ascend/descend 2 steps with Min A with use of no Rail and LRAD. Rehabilitation Potential: Fair  Strengths for achieving goals include:   Pt cooperative  Barriers to achieving goals include:    Pain     Plan    To be seen 3-5 x / week  while in acute care setting for therapeutic exercises, bed mobility, transfers, progressive gait training, balance training, and family/patient education. Sarmad Zhang, PT, DPT #[id number]    If patient discharges from this facility prior to next visit, this note will serve as the Discharge Summary.

## 2020-01-16 VITALS
RESPIRATION RATE: 16 BRPM | WEIGHT: 179.4 LBS | SYSTOLIC BLOOD PRESSURE: 132 MMHG | TEMPERATURE: 97.2 F | DIASTOLIC BLOOD PRESSURE: 74 MMHG | BODY MASS INDEX: 23.78 KG/M2 | HEART RATE: 87 BPM | HEIGHT: 73 IN | OXYGEN SATURATION: 98 %

## 2020-01-16 LAB
ANION GAP SERPL CALCULATED.3IONS-SCNC: 9 MMOL/L (ref 3–16)
BLOOD CULTURE, ROUTINE: NORMAL
BUN BLDV-MCNC: 9 MG/DL (ref 7–20)
CALCIUM SERPL-MCNC: 8.2 MG/DL (ref 8.3–10.6)
CHLORIDE BLD-SCNC: 98 MMOL/L (ref 99–110)
CO2: 30 MMOL/L (ref 21–32)
CREAT SERPL-MCNC: 0.6 MG/DL (ref 0.8–1.3)
CULTURE, BLOOD 2: NORMAL
GFR AFRICAN AMERICAN: >60
GFR NON-AFRICAN AMERICAN: >60
GLUCOSE BLD-MCNC: 102 MG/DL (ref 70–99)
GLUCOSE BLD-MCNC: 183 MG/DL (ref 70–99)
GLUCOSE BLD-MCNC: 84 MG/DL (ref 70–99)
HCT VFR BLD CALC: 34.6 % (ref 40.5–52.5)
HEMOGLOBIN: 11.7 G/DL (ref 13.5–17.5)
MAGNESIUM: 1.8 MG/DL (ref 1.8–2.4)
MCH RBC QN AUTO: 29.4 PG (ref 26–34)
MCHC RBC AUTO-ENTMCNC: 33.7 G/DL (ref 31–36)
MCV RBC AUTO: 87.3 FL (ref 80–100)
PDW BLD-RTO: 13.8 % (ref 12.4–15.4)
PERFORMED ON: ABNORMAL
PERFORMED ON: NORMAL
PHOSPHORUS: 3.7 MG/DL (ref 2.5–4.9)
PLATELET # BLD: 168 K/UL (ref 135–450)
PMV BLD AUTO: 8.3 FL (ref 5–10.5)
POTASSIUM SERPL-SCNC: 4.2 MMOL/L (ref 3.5–5.1)
RBC # BLD: 3.97 M/UL (ref 4.2–5.9)
SODIUM BLD-SCNC: 137 MMOL/L (ref 136–145)
WBC # BLD: 2.8 K/UL (ref 4–11)

## 2020-01-16 PROCEDURE — 6360000002 HC RX W HCPCS: Performed by: PODIATRIST

## 2020-01-16 PROCEDURE — 85027 COMPLETE CBC AUTOMATED: CPT

## 2020-01-16 PROCEDURE — 6370000000 HC RX 637 (ALT 250 FOR IP): Performed by: PODIATRIST

## 2020-01-16 PROCEDURE — 84100 ASSAY OF PHOSPHORUS: CPT

## 2020-01-16 PROCEDURE — 2580000003 HC RX 258: Performed by: PODIATRIST

## 2020-01-16 PROCEDURE — 36415 COLL VENOUS BLD VENIPUNCTURE: CPT

## 2020-01-16 PROCEDURE — 80048 BASIC METABOLIC PNL TOTAL CA: CPT

## 2020-01-16 PROCEDURE — 0JBR0ZZ EXCISION OF LEFT FOOT SUBCUTANEOUS TISSUE AND FASCIA, OPEN APPROACH: ICD-10-PCS | Performed by: PODIATRIST

## 2020-01-16 PROCEDURE — 83735 ASSAY OF MAGNESIUM: CPT

## 2020-01-16 RX ORDER — LINEZOLID 600 MG/1
600 TABLET, FILM COATED ORAL 2 TIMES DAILY
Qty: 20 TABLET | Refills: 0 | Status: SHIPPED | OUTPATIENT
Start: 2020-01-16 | End: 2020-01-26

## 2020-01-16 RX ORDER — OXYCODONE HYDROCHLORIDE AND ACETAMINOPHEN 5; 325 MG/1; MG/1
1 TABLET ORAL EVERY 6 HOURS PRN
Qty: 25 TABLET | Refills: 0 | Status: SHIPPED | OUTPATIENT
Start: 2020-01-16 | End: 2020-01-23

## 2020-01-16 RX ORDER — CIPROFLOXACIN 500 MG/1
500 TABLET, FILM COATED ORAL 2 TIMES DAILY
Qty: 20 TABLET | Refills: 0 | Status: SHIPPED | OUTPATIENT
Start: 2020-01-16 | End: 2020-01-26

## 2020-01-16 RX ORDER — LISINOPRIL 10 MG/1
10 TABLET ORAL DAILY
Qty: 30 TABLET | Refills: 1 | Status: SHIPPED | OUTPATIENT
Start: 2020-01-16

## 2020-01-16 RX ADMIN — VITAMIN D 1000 UNITS: 25 TAB ORAL at 09:47

## 2020-01-16 RX ADMIN — Medication 10 ML: at 09:47

## 2020-01-16 RX ADMIN — ENOXAPARIN SODIUM 40 MG: 40 INJECTION SUBCUTANEOUS at 09:47

## 2020-01-16 RX ADMIN — Medication 1.5 G: at 12:50

## 2020-01-16 RX ADMIN — LISINOPRIL 10 MG: 10 TABLET ORAL at 09:47

## 2020-01-16 RX ADMIN — MEROPENEM 1 G: 1 INJECTION, POWDER, FOR SOLUTION INTRAVENOUS at 09:47

## 2020-01-16 RX ADMIN — MEROPENEM 1 G: 1 INJECTION, POWDER, FOR SOLUTION INTRAVENOUS at 01:27

## 2020-01-16 RX ADMIN — ASPIRIN 81 MG 81 MG: 81 TABLET ORAL at 09:47

## 2020-01-16 RX ADMIN — GABAPENTIN 800 MG: 400 CAPSULE ORAL at 09:47

## 2020-01-16 RX ADMIN — OXYCODONE HYDROCHLORIDE AND ACETAMINOPHEN 2 TABLET: 5; 325 TABLET ORAL at 09:47

## 2020-01-16 RX ADMIN — OXYCODONE HYDROCHLORIDE AND ACETAMINOPHEN 2 TABLET: 5; 325 TABLET ORAL at 13:47

## 2020-01-16 ASSESSMENT — PAIN SCALES - GENERAL
PAINLEVEL_OUTOF10: 8
PAINLEVEL_OUTOF10: 9

## 2020-01-16 NOTE — CARE COORDINATION
[facility]  Received referral regarding HC services from [place]. Pt is a [employer] coverage and [facility] will need to be arranged through [place] physicians office, Dr. Sarah Benton. Telephone call to [place] call center and spoke with Odalis Bustillo, Triage Nurse. Informed Haley of pt plan for d/c home today needing [facility] Services SN, PT/OT. 4:30  At the request of triage nurse, faxed [facility] order, d/c summary, AVS and H&P to Dr. Hernandez All office Hillary Yung .[phone]. Office number [phone]. Informed Ada Ceja CM.    1/17 Received call from [place] office Jackie FORD stating Pt's [facility] MD has received HC orders, however, will not be setting up [facility]. [address] states she has contacted pt to inform and pt will need to follow up with [facility] MD if interested in rehab placement. Lars Benjamin has set pt up with [facility]. in past and [facility]. nurses were asked for money by pt's family and IV drug use noted in home.         Electronically signed by Yaneth Steiner RN on 1/16/2020 at 3:51 PM

## 2020-01-16 NOTE — DISCHARGE INSTR - COC
Continuity of Care Form    Patient Name: Guicho Suarez   :  1955  MRN:  [id number]    Admit date:  2020  Discharge date:  2020    Code Status Order: Full Code   Advance Directives:   Advance Care Flowsheet Documentation     Date/Time Healthcare Directive Type of Healthcare Directive Copy in [address] [place] [address] [address] Agent's Name Healthcare Agent's Phone Number    20 1648  No, patient does not have an advance directive for healthcare treatment  --  --  --  --  --    20 0353  No, patient does not have an advance directive for healthcare treatment  --  --  --  --  --          Admitting Physician:  Karmen Garcia MD  PCP: Seymour Rodgers    [facility]Room[facility] /1773[facility]88  Discharging Unit Phone Number: [phone]    Emergency Contact:   Extended Emergency Contact Information  Primary Emergency Contact: Jenna Shore  Address: [address] [place] [address] Phone: [phone]  Mobile Phone: [phone]  Relation: Child    Past Surgical History:  Past Surgical History:   Procedure Laterality Date    ANKLE FRACTURE SURGERY Right 11/10/2019    ORIF right ankle    ANKLE FRACTURE SURGERY Right 11/10/2019    ANKLE OPEN REDUCTION INTERNAL FIXATION performed by Alejandra Harris DO [facility] [address] [facility] [address] OTHER SURGICAL HISTORY  2020    incision and debridement left foot       Immunization History:   Immunization History   Administered Date(s) Administered    Influenza, Quadv, IM, PF (6 mo and older Fluzone, Flulaval, Fluarix, and 3 yrs and older Afluria) 2020    Tdap (Boostrix, Adacel) 2019, 2020       Active Problems:  Patient Active Problem List   Diagnosis Code    Syncope and collapse R55    Thyroid nodule E04.1    Syncope R55    Closed fracture of right ankle S82.891A    Severe hyperglycemia due to diabetes mellitus ([facility] [address] E11.65    Methamphetamine abuse ([facility] F15.10    Elevated d-dimer R79.89    Type 2 diabetes mellitus with hyperglycemia, with long-term current use of insulin ([facility]) E11.65, Z79.4    Essential hypertension I10    Hyperlipidemia E78.5    Septicemia ([facility]) A41.9    Cellulitis of left foot L03. 80    Type 2 diabetes mellitus with foot ulcer, with long-term current use of insulin ([facility]) E11.621, L97.509, Z79.4    Cellulitis of toe of left foot L03.032       Isolation/Infection:   Isolation          Contact        Patient Infection Status     Infection Onset Added Last Indicated Last Indicated By Review Planned Expiration Resolved Resolved By    [facility] 01/13/20 01/14/20 01/13/20 Anaerobic and Aerobic Culture              Nurse Assessment:  Last Vital Signs: /74   Pulse 87   Temp 97.2 °F (36.2 °C) (Oral)   Resp 16   Ht 6' 1\" (1.854 m)   Wt 179 lb 6.4 oz (81.4 kg)   SpO2 98%   BMI 23.67 kg/m²     Last documented pain score (0-10 scale): Pain Level: 9  Last Weight:   Wt Readings from Last 1 Encounters:   01/16/20 179 lb 6.4 oz (81.4 kg)     Mental Status:  oriented, alert, coherent, logical and thought processes intact    IV Access:  - None    Nursing Mobility/ADLs:  Walking   Assisted  Transfer  Assisted  Bathing  Assisted  Dressing  Assisted  Toileting  Assisted  Feeding  Assisted  Med Admin  Assisted  Med Delivery   whole    Wound Care Documentation and Therapy:        Elimination:  Continence:   · Bowel: Yes  · Bladder: Yes  Urinary Catheter: None   Colostomy/Ileostomy/Ileal Conduit: No       Date of Last BM: 1/16/2020    Intake/Output Summary (Last 24 hours) at 1/16/2020 1324  Last data filed at 1/16/2020 0907  Gross per 24 hour   Intake 600 ml   Output 1350 ml   Net -750 ml     I/O last 3 completed shifts: In: 9620 [P.O.:582; I.V.:762]  Out: 3050 [Urine:3050]    Safety Concerns:      At Risk for Falls    Impairments/Disabilities:      Left foot wound     Nutrition Therapy:  Current

## 2020-01-16 NOTE — PROGRESS NOTES
Patient verbal to staff stating \"wants to go home now, asked for papers twice. \" Writer in with patient to review discharge instructions at this time. Peripheral IV removed per protocol without complication. Patient belongings packed and transport called. Patient did not complete discharge checklist at this time. Meds to beds used, prescriptions for Percocet, Zyvox and ciprofloxacin given to patient.

## 2020-01-16 NOTE — PROGRESS NOTES
history. Review of Systems    CONSTITUTIONAL:  negative  EYES:  negative  HEENT:  negative  RESPIRATORY:  negative  CARDIOVASCULAR:  negative  GASTROINTESTINAL:  negative  GENITOURINARY:  negative  INTEGUMENT/BREAST:  positive for ulcer  MUSCULOSKELETAL: positive for pain  NEUROLOGICAL:  positive for numbness      Objective:   /74   Pulse 87   Temp 97.2 °F (36.2 °C) (Oral)   Resp 16   Ht 6' 1\" (1.854 m)   Wt 179 lb 6.4 oz (81.4 kg)   SpO2 98%   BMI 23.67 kg/m²     Data:  CBC:   Recent Labs     01/14/20  0550 01/15/20  0423 01/16/20  0516   WBC 4.4 3.4* 2.8*   HGB 12.1* 12.2* 11.7*   HCT 35.5* 36.4* 34.6*   MCV 87.1 88.2 87.3    165 168     BMP:   Recent Labs     01/14/20  0550 01/15/20  0423 01/16/20  0516   * 136 137   K 4.1 4.3 4.2   CL 96* 97* 98*   CO2 26 28 30   PHOS 3.8 2.9 3.7   BUN 8 9 9   CREATININE 0.7* 0.7* 0.6*     LIVER PROFILE:   No results for input(s): AST, ALT, LIPASE, BILIDIR, BILITOT, ALKPHOS in the last 72 hours. Invalid input(s): AMYLASE,  ALB  PT/INR:   No results for input(s): PROT, INR in the last 72 hours.   HgBA1c:  Lab Results   Component Value Date    LABA1C 13.8 01/12/2020       Cultures: blood x2 - NGSF  Wound - Staph aureus mrsa (1)     Antibiotic Interpretation JESSICA Status   ciprofloxacin Sensitive <=0.5 mcg/mL    clindamycin Intermediate 1 mcg/mL    erythromycin Resistant >=8 mcg/mL    oxacillin Resistant >=4 mcg/mL    tetracycline Resistant >=16 mcg/mL    trimethoprim-sulfamethoxazole Sensitive <=10 mcg/mL    vancomycin Sensitive 1 mcg/mL    Klebsiella pneumoniae (2)     Antibiotic Interpretation JESSICA Status   ampicillin Resistant >=32 mcg/mL    ceFAZolin Sensitive <=4 mcg/mL    cefepime Sensitive <=0.12 mcg/mL    cefTRIAXone Sensitive <=0.25 mcg/mL    ciprofloxacin Sensitive <=0.25 mcg/mL    ertapenem Sensitive <=0.12 mcg/mL    gentamicin Sensitive <=1 mcg/mL    levofloxacin Sensitive <=0.12 mcg/mL    piperacillin-tazobactam Sensitive 8 mcg/mL trimethoprim-sulfamethoxazole Sensitive <=20 mcg/mL    Enterococcus faecalis (3)     Antibiotic Interpretation JESSICA Status   ampicillin Sensitive <=2 mcg/mL    vancomycin Sensitive 1 mcg/mL            Imaging: xray left foot -   There is a 2 mm radiopaque density within the plantar soft tissues of the   forefoot between the 3rd and 4th digit which may reflect a foreign body. There is mild soft tissue swelling in the forefoot. Calcaneal spur along the   plantar aspect. No acute fracture or dislocation. Joint spaces and   alignment otherwise maintained. No erosive changes or acute periostitis. Impression   2 mm radiopaque density within the plantar soft tissues of the forefoot   between the 3rd and 4th digit which may reflect a foreign body. Mild soft   tissue swelling without acute osseous abnormality. Physical Exam:    General Appearance: alert and oriented to person, place and time, well developed and well- nourished, in no acute distress  Skin: warm and dry, no rash or erythema  Head: normocephalic and atraumatic  Eyes: pupils equal, round, and reactive to light, extraocular eye movements intact, conjunctivae normal  ENT: tympanic membrane, external ear and ear canal normal bilaterally, nose without deformity, nasal mucosa and turbinates normal without polyps  Neck: supple and non-tender without mass, no thyromegaly or thyroid nodules, no cervical lymphadenopathy  Pulmonary/Chest: clear to auscultation bilaterally- no wheezes, rales or rhonchi, normal air movement, no respiratory distress  Cardiovascular: normal rate, regular rhythm, normal S1 and S2, no murmurs, rubs, clicks, or gallops, distal pulses intact, no carotid bruits  Abdomen: soft, non-tender, non-distended, normal bowel sounds, no masses or organomegaly    Dressing clean, dry and intact. No calf tenderness with compression. No proximal erythema or edema. No malodor noted. Mild erythema and edema left foot.  No increase in

## 2020-01-16 NOTE — PLAN OF CARE
Problem: Pain:  Description  Pain management should include both nonpharmacologic and pharmacologic interventions.   Goal: Pain level will decrease  Description  Pain level will decrease  Outcome: Ongoing  Goal: Control of acute pain  Description  Control of acute pain  Outcome: Ongoing  Goal: Control of chronic pain  Description  Control of chronic pain  Outcome: Ongoing  Goal: Patient's pain/discomfort is manageable  Description  Patient's pain/discomfort is manageable  Outcome: Ongoing     Problem: Infection:  Goal: Will remain free from infection  Description  Will remain free from infection  Outcome: Ongoing     Problem: Safety:  Goal: Free from accidental physical injury  Description  Free from accidental physical injury  Outcome: Ongoing  Goal: Free from intentional harm  Description  Free from intentional harm  Outcome: Ongoing     Problem: Daily Care:  Goal: Daily care needs are met  Description  Daily care needs are met  Outcome: Ongoing     Problem: Skin Integrity:  Goal: Skin integrity will stabilize  Description  Skin integrity will stabilize  Outcome: Ongoing     Problem: Discharge Planning:  Goal: Patients continuum of care needs are met  Description  Patients continuum of care needs are met  Outcome: Ongoing     Problem: Falls - Risk of:  Goal: Will remain free from falls  Description  Will remain free from falls  Outcome: Ongoing  Goal: Absence of physical injury  Description  Absence of physical injury  Outcome: Ongoing

## 2020-01-16 NOTE — OP NOTE
[address]                 [address] [address]                                OPERATIVE REPORT    PATIENT NAME: Umair Sahni                      :        1955  MED REC NO:   [id number]                          ROOM:       125  ACCOUNT NO:   [de-identified]                           ADMIT DATE: 2020  PROVIDER:     Deepika Orellana DPM    DATE OF PROCEDURE:  2020    PREOPERATIVE DIAGNOSES:  1. Diabetic foot ulcerations, left foot. 2.  Cellulitis, left foot. 3.  Osteomyelitis. 4.  Diabetes mellitus, uncontrolled. POSTOPERATIVE DIAGNOSES:  1. Diabetic foot ulcerations, left foot. 2.  Cellulitis, left foot. 3.  Osteomyelitis. 4.  Diabetes mellitus, uncontrolled. NAME OF THE PROCEDURE:  Incision and debridement, left foot. SURGEON:  Deepika Orellana DPM    ANESTHESIA:  Local, MAC. HEMOSTASIS:  Ankle tourniquet to 250 mmHg. ESTIMATED BLOOD LOSS:  Less than 50 mL. REPORT OF OPERATION:  The patient was brought in the operating room and  placed on the operating table in supine position. Under mild IV  sedation, the left forefoot was anesthetized with 20 mL of 1:1 mixture  of 1% lidocaine plain and 0.5% Marcaine plain. Foot was then scrubbed,  prepped, and draped in usual sterile manner. Esmarch was then utilized  to exsanguinate the left foot. Ankle tourniquet was then inflated to  250 mmHg. Attention was then directed to the dorsal and plantar aspect of the left  forefoot where the ulceration identified on both the region of the third  web space as well as the plantar aspect of the third and fourth ray. At  this time, the scalpel was then utilized to excise the necrotic skin on  both the superior and plantar aspects of the foot. It was noted  immediately upon penetration of the skin both plantarly and superiorly  and that a large amount of thick purulence was expressed.   Aerobic and  anaerobic cultures were obtained. There was an extremely large amount  of purulence noted in the third and fourth digits. We were not able to  visualize any foreign body in the region; however, a very large amount  of necrotic tissue was excised from both the lateral aspect of the third  digit as well as the medial aspect of the fourth digit. The ulceration  was noted to be a through-and-through ulceration. There was an  extensive amount of necrotic tissue noted as well on the plantar aspect  of the left forefoot. This was sharply excised with a combination of  the scalpel as well as the rongeur. The incision was superiorly and  plantarly measured approximately 1.5 to 2 cm in length. It did not  appear that the underlying bones were exposed; however, there was an  extensive amount of soft tissue necrosis. Surgical sites were then  copiously irrigated with sterile saline via the pulse lavage. All  bleeders were cauterized as necessary. Surgical sites plantarly and  superiorly were then packed with iodoform gauze packing. This was then  covered with gauze, fluffs, Kerlix, ABD, and Coban. Tourniquet was then  deflated. The patient tolerated the procedure and anesthesia well and transferred  to the recovery room with vital signs stable and vascular status intact. There was evidence for prompt hyperemic response to the digits of the  left foot. Following a brief period of postoperative monitoring, the  patient will be transferred back to the floor under the care of the  medical service. The patient does understand that he has a high risk of needing repeat  surgical intervention to resolve his infection.         SOULEYMANE QUIJANO DPM    D: 01/15/2020 19:45:07       T: 01/16/2020 5:08:08     [email or web address]  Job#: [id number]     Doc#: [id number]    CC:

## 2020-01-16 NOTE — PROGRESS NOTES
Attempted to schedule follow up appointment for patient with PCP with [facility], unable to get through to schedule appointment at this time. Will update patient and educate to schedule within 7 days of discharge.

## 2020-01-16 NOTE — DISCHARGE SUMMARY
hydrated with IV fluids and blood sugars are improving.       Hospital Course:      Left foot abscess, cellulitis  Sepsis  -Secondary to patient with uncontrolled DM  stepping on a nail 2 weeks ago. Patricia Johnson does show foreign body  -Consulted podiatry.   S/P I and D of left foot abscess on  1/13  -Continued IV antibiotics  - > on Vanc and Merrem   -Patient has mild sepsis, with tachycardia, tachypnea, lactic acidosis,  which is now resolved  with IV abx and IV  Hydration. - seen by podiatry today . Wound cultures pending  . Dressing changed . Ok to DC on Po abx , with close follow up in wound clinic      Diabetes mellitus, type 2, insulin-dependent - > blood sugars are improving   -blood sugar was uncontrolled with severe hyperglycemia and skin complications.  -Blood sugars over 700 on presentation  -He was not compliant with his home medication  -Resumed Lantus as at home , continue sliding scale insulin coverage.  - continue IV fluid hydration,  monitored blood sugars closely.   - improved now . DC on lantus,metformin- home regimen     Hyponatremia  -This is pseudohyponatremia, secondary to hyperglycemia   -continue IV hydration ,  monitor sodium levels.  Improved      Heart murmur  - recent  echocardiogram reviewed,  he has a sclerotic aortic valve     Hypertension  -  blood pressure stable     Hyperlipidemia   -on statins       Lovenox for DVT prophylaxis  DIET CARB CONTROL; Carb Control: 4 carb choices (60 gms)/meal  Full Code          Consults: Podiatry      Significant Diagnostic Studies:   Data:  CBC:   Recent Labs     01/14/20  0550 01/15/20  0423 01/16/20  0516   WBC 4.4 3.4* 2.8*   RBC 4.08* 4.13* 3.97*   HGB 12.1* 12.2* 11.7*   HCT 35.5* 36.4* 34.6*   MCV 87.1 88.2 87.3   RDW 13.7 13.9 13.8    165 168     BMP:   Recent Labs     01/14/20  0550 01/15/20  0423 01/16/20  0516   * 136 137   K 4.1 4.3 4.2   CL 96* 97* 98*   CO2 26 28 30   PHOS 3.8 2.9 3.7   BUN 8 9 9   CREATININE 0.7* 0.7* 0.6* Radiology    MRI FOOT LEFT WO CONTRAST   Final Result   1. Focus of susceptibility artifact in the plantar aspect of the 3rd   interdigital space corresponding to the foreign body seen on the recent   radiographs. Possible underlying abscess versus phlegmon measuring   approximately 4 x 1.5 x 1 cm. Diffuse cellulitis. 2. Very mild marrow edema in the 3rd proximal phalanx with normal T1 signal   compatible with noninfectious reactive osteitis. Osteomyelitis is unlikely.           XR FOOT LEFT (MIN 3 VIEWS)   Final Result   2 mm radiopaque density within the plantar soft tissues of the forefoot   between the 3rd and 4th digit which may reflect a foreign body. Mild soft   tissue swelling without acute osseous abnormality.               Treatments: as above        Discharge Exam:  Blood pressure 132/74, pulse 87, temperature 97.2 °F (36.2 °C), temperature source Oral, resp. rate 16, height 6' 1\" (1.854 m), weight 179 lb 6.4 oz (81.4 kg), SpO2 98 %. General appearance: alert, appears stated age and cooperative  Head: Normocephalic, without obvious abnormality, atraumatic  Eyes: conjunctivae/corneas clear. PERRL, EOM's intact. Neck: no adenopathy, no carotid bruit, no JVD, supple, symmetrical, trachea midline and thyroid not enlarged, symmetric, no tenderness/mass/nodules  Lungs: clear to auscultation bilaterally  Heart: regular rate and rhythm, S1, S2 normal, + murmur, no click, rub or gallop  Abdomen: soft, non-tender; bowel sounds normal; no masses,  no organomegaly  Extremities: Left foot in dressing .   Pulses: 2+ and symmetric  Skin: see above  Neurologic: Grossly normal      Disposition: home with [place] [address]      Patient Instructions:      Medication List      START taking these medications    ciprofloxacin 500 MG tablet  Commonly known as:  CIPRO  Take 1 tablet by mouth 2 times daily for 10 days     linezolid 600 MG tablet  Commonly known as:  ZYVOX  Take 1 tablet by mouth 2 times daily for 10 days oxyCODONE-acetaminophen 5-325 MG per tablet  Commonly known as:  PERCOCET  Take 1 tablet by mouth every 6 hours as needed for Pain for up to 7 days.         CONTINUE taking these medications    aspirin 81 MG tablet     gabapentin 400 MG capsule  Commonly known as:  NEURONTIN     glipiZIDE 10 MG tablet  Commonly known as:  GLUCOTROL     insulin glargine 100 UNIT/ML injection vial  Commonly known as:  LANTUS     lisinopril 10 MG tablet  Commonly known as:  PRINIVIL  Take 1 tablet by mouth daily     metFORMIN 1000 MG tablet  Commonly known as:  GLUCOPHAGE     simvastatin 10 MG tablet  Commonly known as:  ZOCOR     terazosin 1 MG capsule  Commonly known as:  HYTRIN     Vitamin D (Cholecalciferol) 25 MCG (1000 UT) Tabs        STOP taking these medications    clindamycin 150 MG capsule  Commonly known as:  CLEOCIN     ibuprofen 800 MG tablet  Commonly known as:  ADVIL;MOTRIN     meloxicam 15 MG tablet  Commonly known as:  MOBIC           Where to Get Your Medications      You can get these medications from any pharmacy    Bring a paper prescription for each of these medications  · ciprofloxacin 500 MG tablet  · linezolid 600 MG tablet  · lisinopril 10 MG tablet  · oxyCODONE-acetaminophen 5-325 MG per tablet           Follow-up with  PCP and podiatrist in wound clinic in 1 week     Signed:  Stew Roberson  1/16/2020  1:25 PM

## 2020-01-18 LAB
ANAEROBIC CULTURE: ABNORMAL
GRAM STAIN RESULT: ABNORMAL
ORGANISM: ABNORMAL
WOUND/ABSCESS: ABNORMAL

## 2020-01-23 NOTE — ANESTHESIA POSTPROCEDURE EVALUATION
Department of Anesthesiology  Postprocedure Note    Patient: Manjit Obrien  MRN: [id number]  [place]: 1955  Date of evaluation: 1/23/2020  Time:  11:28 AM     Procedure Summary     Date:  01/13/20 Room / Location:  hospitals / [facility]    Anesthesia Start:  9985 Anesthesia Stop:  0199    Procedure:  INCISION AND DEBRIDEMENT LEFT FOOT (N/A Foot) Diagnosis:  (DIABETIC FOOT ULCER, OSTEOMYELITIS)    Surgeon:  Nora Strauss DPM Responsible Provider:  Caterina Quigley MD    Anesthesia Type:  general ASA Status:  3 - Emergent          Anesthesia Type: general    Brian Phase I: Brian Score: 10    Brian Phase II:      Last vitals: Reviewed and per EMR flowsheets. Anesthesia Post Evaluation    Comments: Postoperative Anesthesia Note    Name:    Manjit Obrien  MRN:      [id number]    Empty flowsheet group.        LABS:    CBC  Lab Results       Component                Value               Date/Time                  WBC                      2.8 (L)             01/16/2020 05:16 AM        HGB                      11.7 (L)            01/16/2020 05:16 AM        HCT                      34.6 (L)            01/16/2020 05:16 AM        PLT                      168                 01/16/2020 05:16 AM   RENAL  Lab Results       Component                Value               Date/Time                  NA                       137                 01/16/2020 05:16 AM        K                        4.2                 01/16/2020 05:16 AM        K                        4.1                 01/12/2020 04:09 AM        CL                       98 (L)              01/16/2020 05:16 AM        CO2                      30                  01/16/2020 05:16 AM        BUN                      9                   01/16/2020 05:16 AM        CREATININE               0.6 (L)             01/16/2020 05:16 AM        GLUCOSE                  102 (H)             01/16/2020 05:16 AM   COAGS  Lab Results       Component Value               Date/Time                  PROTIME                  12.3                01/12/2020 04:12 AM        INR                      1.06                01/12/2020 04:12 AM     Intake & Output:  @15TPFU@    Nausea & Vomiting:  No    Level of Consciousness:  Awake    Pain Assessment:  Adequate analgesia    Anesthesia Complications:  No apparent anesthetic complications    SUMMARY      Vital signs stable  OK to discharge from Stage I post anesthesia care.   Care transferred from Anesthesiology department on discharge from perioperative area

## 2020-02-04 ENCOUNTER — HOSPITAL ENCOUNTER (INPATIENT)
Age: 65
LOS: 6 days | Discharge: NURSING, REHAB OR LONG-TERM CARE | DRG: 638 | End: 2020-02-11
Attending: EMERGENCY MEDICINE | Admitting: INTERNAL MEDICINE
Payer: OTHER GOVERNMENT

## 2020-02-04 ENCOUNTER — APPOINTMENT (OUTPATIENT)
Dept: GENERAL RADIOLOGY | Age: 65
DRG: 638 | End: 2020-02-04
Payer: OTHER GOVERNMENT

## 2020-02-04 LAB
A/G RATIO: 1.4 (ref 1.1–2.2)
ALBUMIN SERPL-MCNC: 3.8 G/DL (ref 3.4–5)
ALP BLD-CCNC: 163 U/L (ref 40–129)
ALT SERPL-CCNC: 72 U/L (ref 10–40)
ANION GAP SERPL CALCULATED.3IONS-SCNC: 13 MMOL/L (ref 3–16)
AST SERPL-CCNC: 46 U/L (ref 15–37)
BASOPHILS ABSOLUTE: 0 K/UL (ref 0–0.2)
BASOPHILS RELATIVE PERCENT: 1 %
BILIRUB SERPL-MCNC: 0.3 MG/DL (ref 0–1)
BUN BLDV-MCNC: 12 MG/DL (ref 7–20)
CALCIUM SERPL-MCNC: 8.8 MG/DL (ref 8.3–10.6)
CHLORIDE BLD-SCNC: 92 MMOL/L (ref 99–110)
CO2: 25 MMOL/L (ref 21–32)
CREAT SERPL-MCNC: 0.8 MG/DL (ref 0.8–1.3)
EOSINOPHILS ABSOLUTE: 0.3 K/UL (ref 0–0.6)
EOSINOPHILS RELATIVE PERCENT: 8.1 %
GFR AFRICAN AMERICAN: >60
GFR NON-AFRICAN AMERICAN: >60
GLOBULIN: 2.7 G/DL
GLUCOSE BLD-MCNC: 491 MG/DL (ref 70–99)
HCT VFR BLD CALC: 38.9 % (ref 40.5–52.5)
HEMOGLOBIN: 12.9 G/DL (ref 13.5–17.5)
LACTIC ACID, SEPSIS: 2.6 MMOL/L (ref 0.4–1.9)
LYMPHOCYTES ABSOLUTE: 1.2 K/UL (ref 1–5.1)
LYMPHOCYTES RELATIVE PERCENT: 30.1 %
MCH RBC QN AUTO: 28.9 PG (ref 26–34)
MCHC RBC AUTO-ENTMCNC: 33.3 G/DL (ref 31–36)
MCV RBC AUTO: 87 FL (ref 80–100)
MONOCYTES ABSOLUTE: 0.6 K/UL (ref 0–1.3)
MONOCYTES RELATIVE PERCENT: 14.2 %
NEUTROPHILS ABSOLUTE: 1.9 K/UL (ref 1.7–7.7)
NEUTROPHILS RELATIVE PERCENT: 46.6 %
PDW BLD-RTO: 14 % (ref 12.4–15.4)
PLATELET # BLD: 229 K/UL (ref 135–450)
PMV BLD AUTO: 8 FL (ref 5–10.5)
POTASSIUM SERPL-SCNC: 4.5 MMOL/L (ref 3.5–5.1)
RBC # BLD: 4.47 M/UL (ref 4.2–5.9)
SODIUM BLD-SCNC: 130 MMOL/L (ref 136–145)
TOTAL PROTEIN: 6.5 G/DL (ref 6.4–8.2)
WBC # BLD: 4.1 K/UL (ref 4–11)

## 2020-02-04 PROCEDURE — 99285 EMERGENCY DEPT VISIT HI MDM: CPT

## 2020-02-04 PROCEDURE — 83605 ASSAY OF LACTIC ACID: CPT

## 2020-02-04 PROCEDURE — 73630 X-RAY EXAM OF FOOT: CPT

## 2020-02-04 PROCEDURE — 87040 BLOOD CULTURE FOR BACTERIA: CPT

## 2020-02-04 PROCEDURE — 85025 COMPLETE CBC W/AUTO DIFF WBC: CPT

## 2020-02-04 PROCEDURE — 85652 RBC SED RATE AUTOMATED: CPT

## 2020-02-04 PROCEDURE — 36415 COLL VENOUS BLD VENIPUNCTURE: CPT

## 2020-02-04 PROCEDURE — 80053 COMPREHEN METABOLIC PANEL: CPT

## 2020-02-04 PROCEDURE — 86140 C-REACTIVE PROTEIN: CPT

## 2020-02-04 PROCEDURE — 87150 DNA/RNA AMPLIFIED PROBE: CPT

## 2020-02-04 RX ORDER — 0.9 % SODIUM CHLORIDE 0.9 %
30 INTRAVENOUS SOLUTION INTRAVENOUS ONCE
Status: COMPLETED | OUTPATIENT
Start: 2020-02-04 | End: 2020-02-05

## 2020-02-04 RX ORDER — MORPHINE SULFATE 4 MG/ML
4 INJECTION, SOLUTION INTRAMUSCULAR; INTRAVENOUS
Status: DISCONTINUED | OUTPATIENT
Start: 2020-02-04 | End: 2020-02-05 | Stop reason: HOSPADM

## 2020-02-04 RX ORDER — ONDANSETRON 2 MG/ML
4 INJECTION INTRAMUSCULAR; INTRAVENOUS
Status: DISCONTINUED | OUTPATIENT
Start: 2020-02-04 | End: 2020-02-05 | Stop reason: HOSPADM

## 2020-02-04 ASSESSMENT — PAIN SCALES - GENERAL: PAINLEVEL_OUTOF10: 7

## 2020-02-04 ASSESSMENT — PAIN DESCRIPTION - PAIN TYPE: TYPE: ACUTE PAIN

## 2020-02-05 LAB
A/G RATIO: 1.5 (ref 1.1–2.2)
ALBUMIN SERPL-MCNC: 3.3 G/DL (ref 3.4–5)
ALP BLD-CCNC: 129 U/L (ref 40–129)
ALT SERPL-CCNC: 60 U/L (ref 10–40)
ANION GAP SERPL CALCULATED.3IONS-SCNC: 8 MMOL/L (ref 3–16)
AST SERPL-CCNC: 35 U/L (ref 15–37)
BASOPHILS ABSOLUTE: 0.1 K/UL (ref 0–0.2)
BASOPHILS RELATIVE PERCENT: 0.8 %
BILIRUB SERPL-MCNC: <0.2 MG/DL (ref 0–1)
BUN BLDV-MCNC: 9 MG/DL (ref 7–20)
C-REACTIVE PROTEIN: 14.7 MG/L (ref 0–5.1)
CALCIUM SERPL-MCNC: 8 MG/DL (ref 8.3–10.6)
CHLORIDE BLD-SCNC: 101 MMOL/L (ref 99–110)
CO2: 26 MMOL/L (ref 21–32)
CREAT SERPL-MCNC: 0.7 MG/DL (ref 0.8–1.3)
EOSINOPHILS ABSOLUTE: 0.5 K/UL (ref 0–0.6)
EOSINOPHILS RELATIVE PERCENT: 6.8 %
GFR AFRICAN AMERICAN: >60
GFR NON-AFRICAN AMERICAN: >60
GLOBULIN: 2.2 G/DL
GLUCOSE BLD-MCNC: 126 MG/DL (ref 70–99)
GLUCOSE BLD-MCNC: 179 MG/DL (ref 70–99)
GLUCOSE BLD-MCNC: 197 MG/DL
GLUCOSE BLD-MCNC: 197 MG/DL (ref 70–99)
GLUCOSE BLD-MCNC: 198 MG/DL (ref 70–99)
GLUCOSE BLD-MCNC: 202 MG/DL (ref 70–99)
GLUCOSE BLD-MCNC: 259 MG/DL (ref 70–99)
GLUCOSE BLD-MCNC: 273 MG/DL (ref 70–99)
HCT VFR BLD CALC: 34.3 % (ref 40.5–52.5)
HEMOGLOBIN: 11.6 G/DL (ref 13.5–17.5)
LACTIC ACID, SEPSIS: 1.8 MMOL/L (ref 0.4–1.9)
LYMPHOCYTES ABSOLUTE: 1.2 K/UL (ref 1–5.1)
LYMPHOCYTES RELATIVE PERCENT: 18.5 %
MCH RBC QN AUTO: 29.2 PG (ref 26–34)
MCHC RBC AUTO-ENTMCNC: 33.9 G/DL (ref 31–36)
MCV RBC AUTO: 86.3 FL (ref 80–100)
MONOCYTES ABSOLUTE: 0.7 K/UL (ref 0–1.3)
MONOCYTES RELATIVE PERCENT: 10.9 %
NEUTROPHILS ABSOLUTE: 4.2 K/UL (ref 1.7–7.7)
NEUTROPHILS RELATIVE PERCENT: 63 %
PDW BLD-RTO: 13.8 % (ref 12.4–15.4)
PERFORMED ON: ABNORMAL
PLATELET # BLD: 214 K/UL (ref 135–450)
PMV BLD AUTO: 8.2 FL (ref 5–10.5)
POTASSIUM REFLEX MAGNESIUM: 4.3 MMOL/L (ref 3.5–5.1)
RBC # BLD: 3.97 M/UL (ref 4.2–5.9)
REPORT: NORMAL
SEDIMENTATION RATE, ERYTHROCYTE: 17 MM/HR (ref 0–20)
SODIUM BLD-SCNC: 135 MMOL/L (ref 136–145)
TOTAL PROTEIN: 5.5 G/DL (ref 6.4–8.2)
WBC # BLD: 6.7 K/UL (ref 4–11)

## 2020-02-05 PROCEDURE — 6370000000 HC RX 637 (ALT 250 FOR IP): Performed by: INTERNAL MEDICINE

## 2020-02-05 PROCEDURE — 83605 ASSAY OF LACTIC ACID: CPT

## 2020-02-05 PROCEDURE — 6360000002 HC RX W HCPCS: Performed by: EMERGENCY MEDICINE

## 2020-02-05 PROCEDURE — 87205 SMEAR GRAM STAIN: CPT

## 2020-02-05 PROCEDURE — 80053 COMPREHEN METABOLIC PANEL: CPT

## 2020-02-05 PROCEDURE — 36415 COLL VENOUS BLD VENIPUNCTURE: CPT

## 2020-02-05 PROCEDURE — 6360000002 HC RX W HCPCS: Performed by: INTERNAL MEDICINE

## 2020-02-05 PROCEDURE — 87077 CULTURE AEROBIC IDENTIFY: CPT

## 2020-02-05 PROCEDURE — 2580000003 HC RX 258: Performed by: NURSE PRACTITIONER

## 2020-02-05 PROCEDURE — 2580000003 HC RX 258: Performed by: INTERNAL MEDICINE

## 2020-02-05 PROCEDURE — 6360000002 HC RX W HCPCS: Performed by: NURSE PRACTITIONER

## 2020-02-05 PROCEDURE — 87186 SC STD MICRODIL/AGAR DIL: CPT

## 2020-02-05 PROCEDURE — 96361 HYDRATE IV INFUSION ADD-ON: CPT

## 2020-02-05 PROCEDURE — 86403 PARTICLE AGGLUT ANTBDY SCRN: CPT

## 2020-02-05 PROCEDURE — 96365 THER/PROPH/DIAG IV INF INIT: CPT

## 2020-02-05 PROCEDURE — 1200000000 HC SEMI PRIVATE

## 2020-02-05 PROCEDURE — 87070 CULTURE OTHR SPECIMN AEROBIC: CPT

## 2020-02-05 PROCEDURE — 96375 TX/PRO/DX INJ NEW DRUG ADDON: CPT

## 2020-02-05 PROCEDURE — 6370000000 HC RX 637 (ALT 250 FOR IP): Performed by: EMERGENCY MEDICINE

## 2020-02-05 PROCEDURE — 87040 BLOOD CULTURE FOR BACTERIA: CPT

## 2020-02-05 PROCEDURE — 85025 COMPLETE CBC W/AUTO DIFF WBC: CPT

## 2020-02-05 PROCEDURE — 2580000003 HC RX 258: Performed by: EMERGENCY MEDICINE

## 2020-02-05 PROCEDURE — 6370000000 HC RX 637 (ALT 250 FOR IP): Performed by: NURSE PRACTITIONER

## 2020-02-05 RX ORDER — VITAMIN B COMPLEX
1000 TABLET ORAL DAILY
Status: DISCONTINUED | OUTPATIENT
Start: 2020-02-05 | End: 2020-02-11 | Stop reason: HOSPADM

## 2020-02-05 RX ORDER — SODIUM CHLORIDE 0.9 % (FLUSH) 0.9 %
10 SYRINGE (ML) INJECTION EVERY 12 HOURS SCHEDULED
Status: DISCONTINUED | OUTPATIENT
Start: 2020-02-05 | End: 2020-02-11 | Stop reason: HOSPADM

## 2020-02-05 RX ORDER — INSULIN GLARGINE 100 [IU]/ML
40 INJECTION, SOLUTION SUBCUTANEOUS NIGHTLY
Status: DISCONTINUED | OUTPATIENT
Start: 2020-02-05 | End: 2020-02-11 | Stop reason: HOSPADM

## 2020-02-05 RX ORDER — DOXAZOSIN 2 MG/1
1 TABLET ORAL DAILY
Status: DISCONTINUED | OUTPATIENT
Start: 2020-02-05 | End: 2020-02-11 | Stop reason: HOSPADM

## 2020-02-05 RX ORDER — ACETAMINOPHEN 325 MG/1
650 TABLET ORAL EVERY 4 HOURS PRN
Status: DISCONTINUED | OUTPATIENT
Start: 2020-02-05 | End: 2020-02-11 | Stop reason: HOSPADM

## 2020-02-05 RX ORDER — DEXTROSE MONOHYDRATE 25 G/50ML
12.5 INJECTION, SOLUTION INTRAVENOUS PRN
Status: DISCONTINUED | OUTPATIENT
Start: 2020-02-05 | End: 2020-02-11 | Stop reason: HOSPADM

## 2020-02-05 RX ORDER — SODIUM CHLORIDE 0.9 % (FLUSH) 0.9 %
10 SYRINGE (ML) INJECTION PRN
Status: DISCONTINUED | OUTPATIENT
Start: 2020-02-05 | End: 2020-02-11 | Stop reason: HOSPADM

## 2020-02-05 RX ORDER — SIMVASTATIN 10 MG
10 TABLET ORAL NIGHTLY
Status: DISCONTINUED | OUTPATIENT
Start: 2020-02-05 | End: 2020-02-11 | Stop reason: HOSPADM

## 2020-02-05 RX ORDER — ONDANSETRON 2 MG/ML
4 INJECTION INTRAMUSCULAR; INTRAVENOUS EVERY 6 HOURS PRN
Status: DISCONTINUED | OUTPATIENT
Start: 2020-02-05 | End: 2020-02-11 | Stop reason: HOSPADM

## 2020-02-05 RX ORDER — ASPIRIN 81 MG/1
81 TABLET, CHEWABLE ORAL DAILY
Status: DISCONTINUED | OUTPATIENT
Start: 2020-02-05 | End: 2020-02-11 | Stop reason: HOSPADM

## 2020-02-05 RX ORDER — NICOTINE POLACRILEX 4 MG
15 LOZENGE BUCCAL PRN
Status: DISCONTINUED | OUTPATIENT
Start: 2020-02-05 | End: 2020-02-11 | Stop reason: HOSPADM

## 2020-02-05 RX ORDER — TRAMADOL HYDROCHLORIDE 50 MG/1
100 TABLET ORAL EVERY 8 HOURS PRN
Status: DISCONTINUED | OUTPATIENT
Start: 2020-02-05 | End: 2020-02-06 | Stop reason: ALTCHOICE

## 2020-02-05 RX ORDER — GABAPENTIN 400 MG/1
800 CAPSULE ORAL 3 TIMES DAILY
Status: DISCONTINUED | OUTPATIENT
Start: 2020-02-05 | End: 2020-02-11 | Stop reason: HOSPADM

## 2020-02-05 RX ORDER — SODIUM CHLORIDE 9 MG/ML
INJECTION, SOLUTION INTRAVENOUS CONTINUOUS
Status: DISCONTINUED | OUTPATIENT
Start: 2020-02-05 | End: 2020-02-07

## 2020-02-05 RX ORDER — LISINOPRIL 10 MG/1
10 TABLET ORAL DAILY
Status: DISCONTINUED | OUTPATIENT
Start: 2020-02-05 | End: 2020-02-11 | Stop reason: HOSPADM

## 2020-02-05 RX ORDER — DEXTROSE MONOHYDRATE 50 MG/ML
100 INJECTION, SOLUTION INTRAVENOUS PRN
Status: DISCONTINUED | OUTPATIENT
Start: 2020-02-05 | End: 2020-02-11 | Stop reason: HOSPADM

## 2020-02-05 RX ADMIN — INSULIN LISPRO 2 UNITS: 100 INJECTION, SOLUTION INTRAVENOUS; SUBCUTANEOUS at 02:17

## 2020-02-05 RX ADMIN — INSULIN GLARGINE 40 UNITS: 100 INJECTION, SOLUTION SUBCUTANEOUS at 21:04

## 2020-02-05 RX ADMIN — ASPIRIN 81 MG 81 MG: 81 TABLET ORAL at 09:26

## 2020-02-05 RX ADMIN — INSULIN LISPRO 7 UNITS: 100 INJECTION, SOLUTION INTRAVENOUS; SUBCUTANEOUS at 09:25

## 2020-02-05 RX ADMIN — MEROPENEM 1 G: 1 INJECTION, POWDER, FOR SOLUTION INTRAVENOUS at 09:27

## 2020-02-05 RX ADMIN — VANCOMYCIN HYDROCHLORIDE 1250 MG: 1 INJECTION, POWDER, LYOPHILIZED, FOR SOLUTION INTRAVENOUS at 01:12

## 2020-02-05 RX ADMIN — GABAPENTIN 800 MG: 400 CAPSULE ORAL at 21:05

## 2020-02-05 RX ADMIN — INSULIN HUMAN 8 UNITS: 100 INJECTION, SOLUTION PARENTERAL at 00:03

## 2020-02-05 RX ADMIN — LISINOPRIL 10 MG: 10 TABLET ORAL at 09:27

## 2020-02-05 RX ADMIN — INSULIN LISPRO 6 UNITS: 100 INJECTION, SOLUTION INTRAVENOUS; SUBCUTANEOUS at 17:02

## 2020-02-05 RX ADMIN — VITAMIN D, TAB 1000IU (100/BT) 1000 UNITS: 25 TAB at 09:27

## 2020-02-05 RX ADMIN — INSULIN LISPRO 3 UNITS: 100 INJECTION, SOLUTION INTRAVENOUS; SUBCUTANEOUS at 12:26

## 2020-02-05 RX ADMIN — ONDANSETRON 4 MG: 2 INJECTION INTRAMUSCULAR; INTRAVENOUS at 00:03

## 2020-02-05 RX ADMIN — Medication 10 ML: at 21:04

## 2020-02-05 RX ADMIN — GABAPENTIN 800 MG: 400 CAPSULE ORAL at 15:06

## 2020-02-05 RX ADMIN — MEROPENEM 1 G: 1 INJECTION, POWDER, FOR SOLUTION INTRAVENOUS at 00:12

## 2020-02-05 RX ADMIN — SODIUM CHLORIDE: 9 INJECTION, SOLUTION INTRAVENOUS at 02:20

## 2020-02-05 RX ADMIN — SIMVASTATIN 10 MG: 10 TABLET, FILM COATED ORAL at 21:05

## 2020-02-05 RX ADMIN — PIPERACILLIN AND TAZOBACTAM 3.38 G: 3; .375 INJECTION, POWDER, LYOPHILIZED, FOR SOLUTION INTRAVENOUS at 15:03

## 2020-02-05 RX ADMIN — INSULIN LISPRO 7 UNITS: 100 INJECTION, SOLUTION INTRAVENOUS; SUBCUTANEOUS at 12:26

## 2020-02-05 RX ADMIN — VANCOMYCIN HYDROCHLORIDE 1250 MG: 10 INJECTION, POWDER, LYOPHILIZED, FOR SOLUTION INTRAVENOUS at 23:47

## 2020-02-05 RX ADMIN — MORPHINE SULFATE 4 MG: 4 INJECTION, SOLUTION INTRAMUSCULAR; INTRAVENOUS at 00:02

## 2020-02-05 RX ADMIN — SODIUM CHLORIDE 2448 ML: 9 INJECTION, SOLUTION INTRAVENOUS at 00:03

## 2020-02-05 RX ADMIN — VANCOMYCIN HYDROCHLORIDE 1250 MG: 10 INJECTION, POWDER, LYOPHILIZED, FOR SOLUTION INTRAVENOUS at 12:28

## 2020-02-05 RX ADMIN — DOXAZOSIN 1 MG: 2 TABLET ORAL at 09:27

## 2020-02-05 RX ADMIN — TRAMADOL HYDROCHLORIDE 100 MG: 50 TABLET, FILM COATED ORAL at 21:09

## 2020-02-05 RX ADMIN — TRAMADOL HYDROCHLORIDE 100 MG: 50 TABLET, FILM COATED ORAL at 09:26

## 2020-02-05 RX ADMIN — GABAPENTIN 800 MG: 400 CAPSULE ORAL at 09:27

## 2020-02-05 RX ADMIN — INSULIN LISPRO 9 UNITS: 100 INJECTION, SOLUTION INTRAVENOUS; SUBCUTANEOUS at 09:25

## 2020-02-05 RX ADMIN — INSULIN LISPRO 7 UNITS: 100 INJECTION, SOLUTION INTRAVENOUS; SUBCUTANEOUS at 17:03

## 2020-02-05 RX ADMIN — PIPERACILLIN AND TAZOBACTAM 3.38 G: 3; .375 INJECTION, POWDER, LYOPHILIZED, FOR SOLUTION INTRAVENOUS at 21:03

## 2020-02-05 RX ADMIN — SODIUM CHLORIDE: 9 INJECTION, SOLUTION INTRAVENOUS at 15:03

## 2020-02-05 ASSESSMENT — PAIN SCALES - GENERAL
PAINLEVEL_OUTOF10: 7
PAINLEVEL_OUTOF10: 0
PAINLEVEL_OUTOF10: 8
PAINLEVEL_OUTOF10: 4
PAINLEVEL_OUTOF10: 6

## 2020-02-05 ASSESSMENT — PAIN DESCRIPTION - LOCATION: LOCATION: FOOT;TOE (COMMENT WHICH ONE)

## 2020-02-05 ASSESSMENT — PAIN DESCRIPTION - ORIENTATION: ORIENTATION: LEFT

## 2020-02-05 NOTE — H&P
Hospital Medicine History & Physical      PCP: Safia Fowler    Date of Admission: 2/4/2020    Date of Service: Pt seen/examined on 2/5/2020 and Admitted to Inpatient with expected LOS greater than two midnights due to medical therapy. Chief Complaint:    Chief Complaint   Patient presents with    Post-op Problem     Pt reports had surgery with Dr Kip Benton approx 3 weeks ago on left foot following stepping on a nail and getting an infection. Pt reports over the past 24 hours the pain and reddness has returned and worsened, pt also reports blisters forming on left foot. History Of Present Illness:  anything    59 y.o. male, with PMH of HTN, HLD, DM 2, polysubstance abuse, and left foot infection, who presented to [facility] with left foot infection. History was obtained from the patient review of the EMR. The patient states that he stepped on a nail over 4 weeks ago and was previously treated for infection in his foot between the third and fourth toe. He was seen by Dr. Kip Benton, podiatry, at [facility] and had an I&D of this wound on 1/16/2020. He states that he was on antibiotics at [facility], vancomycin and Merrem, and was discharged on oral Cipro and linezolid as his previous wound culture showed MRSA, Klebsiella, and enterococcus. He states that his recovery has been going very well until last night when he noticed that the top of his foot was very red, blistering, and seeping out serous drainage. He states that even upon waking in the morning, his foot looked normal.  He decided to come into the ED for further evaluation as he did not want the infection to get worse. The patient denies having any fever or chills. No loss of sensation in the foot. He is able to wiggle his toes. Currently has a nonadherent pad with Kerlix wrapped around his left foot. The patient denied any other associated symptoms as well as any aggravating and/or alleviating factors.  At the time of this assessment, the patient was resting comfortably in bed. He currently denies any chest pain, back pain, abdominal pain, shortness of breath, numbness, tingling, N/V/C/D, fever and/or chills. The patient does admit to previously not having cared about his health at all. He states that he used to not monitor his blood sugar or even take his medications. He is very motivated to start taking better care of himself. Of note, the patient does state that he is an active drug user. He uses heroin and methamphetamine on an intermittent basis, last use being the day before yesterday. He denies ever having gone through any type of withdrawal symptoms. He states that he would like information about checking into rehab facility after discharge from the hospital.    Past Medical History:          Diagnosis Date    Arthritis     Diabetes mellitus ([facility]     Hx of blood clots     Hyperlipidemia     Hypertension     MRSA (methicillin resistant staph aureus) culture positive 01/12/2020    LEFT FOOT ULCER    Type 2 diabetes mellitus with foot ulcer, with long-term current use of insulin ([facility] [address] 1/13/2020       Past Surgical History:          Procedure Laterality Date    ANKLE FRACTURE SURGERY Right 11/10/2019    ORIF right ankle    ANKLE FRACTURE SURGERY Right 11/10/2019    ANKLE OPEN REDUCTION INTERNAL FIXATION performed by Yolande Mccullough DO at [address] N/A 1/13/2020    INCISION AND DEBRIDEMENT LEFT FOOT [facility] Delores Burns DPM [facility] [address]  01/13/2020    incision and debridement left foot       Medications Prior to Admission:      Prior to Admission medications    Medication Sig Start Date End Date Taking?  Authorizing Provider   lisinopril (PRINIVIL) 10 MG tablet Take 1 tablet by mouth daily 1/16/20  Yes Wesley Jordan MD   Vitamin D, Cholecalciferol, 25 MCG (1000 UT) TABS Take 1 tablet by mouth daily   Yes Historical Provider, MD Regular rate and rhythm with normal S1/S2 without murmurs, rubs or gallops. Abdomen: Soft, non-tender, non-distended with normal bowel sounds. Musculoskeletal:  No clubbing, cyanosis or edema bilaterally. Full range of motion without deformity. Skin: Tenderness, erythematous, and edematous left distal foot with blister and serous drainage noted. Nonadherent pad + kerlix placed per nursing. Neurologic:  Neurovascularly intact without any focal sensory/motor deficits. Cranial nerves: II-XII intact, grossly non-focal.  Psychiatric:  Alert and oriented, thought content appropriate, normal insight  Capillary Refill: Brisk,< 3 seconds   Peripheral Pulses: +2 palpable, equal bilaterally       Labs:     Recent Labs     02/04/20  2242   WBC 4.1   HGB 12.9*   HCT 38.9*        Recent Labs     02/04/20  2242   *   K 4.5   CL 92*   CO2 25   BUN 12   CREATININE 0.8   CALCIUM 8.8     Recent Labs     02/04/20  2242   AST 46*   ALT 72*   BILITOT 0.3   ALKPHOS 163*     No results for input(s): INR in the last 72 hours. No results for input(s): Dae Prayer in the last 72 hours. Urinalysis:      Lab Results   Component Value Date    NITRU Negative 11/08/2019    WBCUA 3-5 05/31/2018    BACTERIA 1+ 05/31/2018    RBCUA None seen 05/31/2018    BLOODU Negative 11/08/2019    SPECGRAV <=1.005 11/08/2019    GLUCOSEU >=1000 11/08/2019       Radiology:     CXR: I have reviewed the CXR with the following interpretation: n/a  EKG:  I have reviewed the EKG with the following interpretation: n/a    XR FOOT LEFT (MIN 3 VIEWS)   Final Result   1. No fracture or malalignment.    2. Interval foreign body removal.             ASSESSMENT:    Active Hospital Problems    Diagnosis Date Noted    Cellulitis of left foot [L03.116] 01/12/2020    Type 2 diabetes mellitus with hyperglycemia, with long-term current use of insulin (HCC) [E11.65, Z79.4]     Hyperlipidemia [E78.5]     Essential hypertension [I10]      PLAN:    Left

## 2020-02-05 NOTE — PROGRESS NOTES
4 Eyes Skin Assessment     The patient is being assess for  Admission    I agree that 2 RN's have performed a thorough Head to Toe Skin Assessment on the patient. ALL assessment sites listed below have been assessed. Areas assessed by both nurses: Brett Woo RN   [x]   Head, Face, and Ears   [x]   Shoulders, Back, and Chest  [x]   Arms, Elbows, and Hands   [x]   Coccyx, Sacrum, and Ischum  [x]   Legs, Feet, and Heels        -Left foot cellulitis- weeping, redness  -abrasions on BLE and BUE  - rash on tejal area  -burn on right side tejal area    Does the Patient have Skin Breakdown?   No         Chris Prevention initiated:  NA   Wound Care Orders initiated:  Yes      [address]  nurse consulted for Pressure Injury (Stage 3,4, Unstageable, DTI, NWPT, and Complex wounds):  Yes      Nurse 1 eSignature: Electronically signed by Luis Garza RN on 2/5/20 at 1:48 AM    **SHARE this note so that the co-signing nurse is able to place an eSignature**    Nurse 2 eSignature: Electronically signed by Tiff Saldaña RN on 2/5/20 at 3:23 AM

## 2020-02-05 NOTE — CARE COORDINATION
CASE MANAGEMENT INITIAL ASSESSMENT      Reviewed chart and met with patient today, re: Triggered by age and diagnosis  Explained Case Management role/services. Family present:   Primary contact information:     Admit date/status:   Diagnosis:   Is this a Readmission?:  (If yes, why did you come back to the hospital? E.g.   Alarming symptoms, medication issues, PCP recs)    Insurance:   Precert required for SNF - Y, N        3 night stay required - Y, N    Living arrangements, Adls, care needs, prior to admission:     Transportation:     Durable Medical Equipment at home: Walker__Cane__RTS__ BSC__Shower Chair__  02__ HHN__ CPAP__  BiPap__  Hospital Bed__ W/C___ Other__________    Services in the home and/or outpatient, prior to admission:     Dialysis Facility (if applicable)   · Name:  · Address:  · Dialysis Schedule:  · Phone:  · Fax:    PT/OT recs:     Hospital Exemption Notification (HEN):     Barriers to discharge:     Plan/comments:     ECOC on chart for MD signature

## 2020-02-05 NOTE — ED NOTES
Report given to LILIANA Rebollar at this time. VS as noted. Questions/concerns addressed. Pt denies any concerns at this time.      Vilma Lund RN  02/05/20 1934

## 2020-02-05 NOTE — CONSULTS
Podiatry Consult Note    History of Present Illness: The patient is a 59year old gentleman with uncontrolled DM and cellulitis at the left foot. Approximately five weeks ago, he stepped on a nail. Two weeks after that, he had an I&D with Dr. Fiorella Adrian at [facility] to wash out an infection. The patient reports he was doing very well after this until two days ago when he noticed increased redness, pain and swelling at the left foot. As a result, he presented to [facility] ED and was admitted. At the time of encounter, the patient denied n/v/f/c and SOB.     Past Medical History:        Diagnosis Date    Arthritis     Diabetes mellitus ([facility] [address]     Hx of blood clots     Hyperlipidemia     Hypertension     MRSA (methicillin resistant staph aureus) culture positive 01/12/2020    LEFT FOOT ULCER    Type 2 diabetes mellitus with foot ulcer, with long-term current use of insulin ([facility] [address] 1/13/2020       Past Surgical History:        Procedure Laterality Date    ANKLE FRACTURE SURGERY Right 11/10/2019    ORIF right ankle    ANKLE FRACTURE SURGERY Right 11/10/2019    ANKLE OPEN REDUCTION INTERNAL FIXATION performed by Marisela Villalobos DO at [address] [facility] N/A 1/13/2020    INCISION AND DEBRIDEMENT LEFT FOOT performed by Bebeto Shrestha DPM at [facility]  01/13/2020    incision and debridement left foot       Current Medications:    Current Facility-Administered Medications: aspirin chewable tablet 81 mg, 81 mg, Oral, Daily  gabapentin (NEURONTIN) capsule 800 mg, 800 mg, Oral, TID  insulin glargine (LANTUS) injection vial 40 Units, 40 Units, Subcutaneous, Nightly  lisinopril (PRINIVIL;ZESTRIL) tablet 10 mg, 10 mg, Oral, Daily  simvastatin (ZOCOR) tablet 10 mg, 10 mg, Oral, Nightly  doxazosin (CARDURA) tablet 1 mg, 1 mg, Oral, Daily  Vitamin D (CHOLECALCIFEROL) tablet 1,000 Units, 1,000 Units, Oral, Daily  0.9 % sodium chloride [address]  Office: [phone]  Cell: [phone]

## 2020-02-05 NOTE — CONSULTS
table with yellow clear urine. BP (!) 159/86   Pulse 81   Temp 97.9 °F (36.6 °C) (Oral)   Resp 16   Ht 6' 1\" (1.854 m)   Wt 200 lb 6.4 oz (90.9 kg) Comment: 2 heated blankets, sheet, 2 pillows  SpO2 98%   BMI 26.44 kg/m²     LABS:  WBC:    Lab Results   Component Value Date    WBC 6.7 02/05/2020     H/H:    Lab Results   Component Value Date    HGB 11.6 02/05/2020    HCT 34.3 02/05/2020     PTT:  No results found for: APTT, PTT[APTT}  PT/INR:    Lab Results   Component Value Date    PROTIME 12.3 01/12/2020    INR 1.06 01/12/2020     HgBA1c:    Lab Results   Component Value Date    LABA1C 13.8 01/12/2020       Assessment  Left foot swollen. Dorsal foot red with small bulla, draining serous fluid. In between web space of toes red and macerated. Toes swollen. Plantar foot, 2 closed and dried scabbed trauma wounds. Lower leg red and swollen. Scattered dry scabs on lower leg and arms. See photos.    Chris Risk Score: Chris Scale Score: 20    Patient Active Problem List   Diagnosis    Syncope and collapse    Thyroid nodule    Syncope    Closed fracture of right ankle    Severe hyperglycemia due to diabetes mellitus (HCC)    Methamphetamine abuse (HCC)    Elevated d-dimer    Type 2 diabetes mellitus with hyperglycemia, with long-term current use of insulin ([address]    Essential hypertension    Hyperlipidemia    Septicemia ([address]    Cellulitis of left foot    Type 2 diabetes mellitus with foot ulcer, with long-term current use of insulin (HCC)    Cellulitis of toe of left foot       Measurements:     Incision 11/10/19 Ankle Right (Active)   Number of days: 87       Incision 01/13/20 Foot Left (Active)   Wound Image    2/5/2020 12:49 PM   Wound Assessment Painful;Drainage;Red 2/5/2020 12:49 PM   Melita-wound Assessment Blanchable erythema;Edema 2/5/2020 12:49 PM   Wound Length (cm) 7.8 cm 2/5/2020 12:49 PM   Wound Width (cm) 8.5 cm 2/5/2020 12:49 PM   Wound Depth (cm) 0 cm 2/5/2020 12:49 PM   Wound Volume (cm^3) 0 cm^3 2/5/2020 12:49 PM   Closure None 2/5/2020 12:49 PM   Drainage Amount Moderate 2/5/2020 12:49 PM   Drainage Description Serosanguinous; Serous 2/5/2020 12:49 PM   Odor None 2/5/2020 12:49 PM   Dressing/Treatment Alginate with Ag;Foam;Dry dressing; Adhesive bandage; Ace wrap 2/5/2020 12:49 PM   Dressing Changed Changed/New 2/5/2020 12:49 PM   Dressing Status Changed 2/5/2020 12:49 PM   Dressing Change Due 02/06/20 2/5/2020 12:49 PM   Number of days: 22     Left plantar foot:      Left dorsal foot:            Response to treatment:  With complaints of pain. Pain Assessment:  Severity:  5 / 10  Quality of pain: sharp, burning  Wound Pain Timing/Severity: intermittent  Premedicated: No    Plan   Plan of Care: Incision 01/13/20 Foot Left-Dressing/Treatment: Alginate with Ag, Foam, Dry dressing, Adhesive bandage, Ace wrap     Recommend:  Clean left foot wound with normal saline. Apply OpticelAg In between web space of toes and under toes, Apply foam dressing to dorsal foot. Cover with 4x4 dressings, kerlix then ace wrap from toes to knee (2 ace wraps please). Change daily. Wound care to follow. Call wound care for deterioration [phone], Pager [phone]. Specialty Bed Required : N/A   [] Low Air Loss   [] Pressure Redistribution  [] Fluid Immersion  [] Bariatric  [] Total Pressure Relief  [] Other:     Current Diet: DIET CARB CONTROL; Carb Control: 4 carb choices (60 gms)/meal  Dietician consult:  N/A    Discharge Plan:  Placement for patient upon discharge: skilled nursing    Patient appropriate for Outpatient [address]: Yes    Referrals:  [x]  / discharge planner, may need assistance with dressing changes. [] [address] [place] [place] [employer] [facility]  [] Supplies  [] Other    Patient/Caregiver Teaching: Instructed and demonstrated treatment.   Level of patient/caregiver understanding able to:   [] Indicates understanding       [x] Needs reinforcement  [] Unsuccessful      [] Verbal Understanding  [] Demonstrated understanding       [] No evidence of learning  [] Refused teaching         [] N/A       Electronically signed by Francheska Fitzgerald RN, MSN, Arianna Silverman on 2/5/2020 at 12:51 PM

## 2020-02-05 NOTE — ED PROVIDER NOTES
15 mg/kg Intravenous Once Janet Tierney MD        meropenem [facility]) 1 g in sodium chloride 0.9 % 100 mL IVPB (mini-bag)  1 g Intravenous Once Janet Tierney  mL/hr at 02/05/20 0012 1 g at 02/05/20 0012    morphine (PF) injection 4 mg  4 mg Intravenous Q1H PRN Janet Tierney MD   4 mg at 02/05/20 0002    ondansetron [facility]) injection 4 mg  4 mg Intravenous Q1H PRN Janet Tierney MD   4 mg at 02/05/20 0003     Current Outpatient Medications   Medication Sig Dispense Refill    lisinopril (PRINIVIL) 10 MG tablet Take 1 tablet by mouth daily 30 tablet 1    Vitamin D, Cholecalciferol, 25 MCG (1000 UT) TABS Take 1 tablet by mouth daily      terazosin (HYTRIN) 1 MG capsule Take 1 mg by mouth nightly      gabapentin (NEURONTIN) 400 MG capsule Take 800 mg by mouth 3 times daily. .      glipiZIDE (GLUCOTROL) 10 MG tablet Take 10 mg by mouth 2 times daily (before meals).  metFORMIN (GLUCOPHAGE) 1000 MG tablet Take 1,000 mg by mouth 2 times daily (with meals).  insulin glargine (LANTUS) 100 UNIT/ML injection vial Inject 40 Units into the skin nightly       aspirin 81 MG tablet Take 81 mg by mouth daily.  simvastatin (ZOCOR) 10 MG tablet Take  by mouth nightly. Allergies   Allergen Reactions    Keflex [Cephalexin] Swelling       REVIEW OF SYSTEMS  10 systems reviewed, pertinent positives per HPI otherwise noted to be negative. PHYSICAL EXAM  BP (!) 175/98   Pulse 76   Temp 97.3 °F (36.3 °C) (Oral)   Resp 18   Ht 6' 1\" (1.854 m)   Wt 180 lb (81.6 kg)   SpO2 97%   BMI 23.75 kg/m²    GENERAL APPEARANCE: Awake and alert. Cooperative. No distress. HENT: Normocephalic. Atraumatic. Mucous membranes are dry. NECK: Supple. EYES: PERRL. EOM's grossly intact. HEART/CHEST: RRR. No murmurs. No chest wall tenderness. LUNGS: Respirations unlabored. CTAB. Good air exchange. Speaking comfortably in full sentences. ABDOMEN: No tenderness. Soft. Non-distended. No masses. suggest necrotizing fasciitis or soft tissue gas on x-ray. Patient is notable hyperglycemia is not associated with anion gap. He was given insulin for this and fluids per sepsis protocol should also help. Based on his cephalosporin allergy he was given vancomycin and meropenem upfront and blood cultures are pending. He is unlikely to succeed as an outpatient given his history of failure of outpatient treatment and the apparent aggressive nature of the cellulitis. SEP-1 CORE MEASURE DATA    Classification: severe sepsis    Amount of fluids ordered: at least 30mL/kg based on entered actual weight at time of triage    Time at which sepsis was identified: 11:30pm    Broad-spectrum antibiotics chosen: vanc and merrem based on sepsis order-set for a suspected source of: Skin and Soft Tissue with previous cultures reviewed    Repeat lactate level: pending    On reassessment after fluid resuscitation:   pending, to be completed by inpatient team    During the patient's ED course, the patient was given:  Medications   0.9 % sodium chloride IV bolus 2,448 mL (2,448 mLs Intravenous New Bag 2/5/20 0003)   vancomycin (VANCOCIN) 1,250 mg in dextrose 5 % 250 mL IVPB (has no administration in time range)   meropenem (MERREM) 1 g in sodium chloride 0.9 % 100 mL IVPB (mini-bag) (1 g Intravenous New Bag 2/5/20 0012)   morphine (PF) injection 4 mg (4 mg Intravenous Given 2/5/20 0002)   ondansetron (ZOFRAN) injection 4 mg (4 mg Intravenous Given 2/5/20 0003)   insulin regular (HUMULIN R;NOVOLIN R) injection 8 Units (8 Units Intravenous Given 2/5/20 0003)        CLINICAL IMPRESSION  1. Cellulitis of left foot    2. Severe sepsis ([address]    3. Hyperglycemia    4. Essential hypertension        Blood pressure (!) 175/98, pulse 76, temperature 97.3 °F (36.3 °C), temperature source Oral, resp. rate 18, height 6' 1\" (1.854 m), weight 180 lb (81.6 kg), SpO2 97 %. DISPOSITION  Kezia Mendoza was admitted in fair condition.     I have discussed the findings of today's workup with the patient and addressed the patient's questions and concerns. The plan is to admit to the hospital at this time under the hospitalist service. I spoke with ANTHONY with hospitalist team who accepted the patient and will take over the patient's care. The patient is agreeable with this plan. The total critical care time spent while evaluating and treating this patient was at least 35 minutes. This excludes time spent doing separately billable procedures. This includes time at the bedside, data interpretation, medication management, obtaining critical history from collateral sources if the patient is unable to provide it directly, and physician consultation. Specifics of interventions taken and potentially life-threatening diagnostic considerations are listed above in the medical decision making. DISCLAIMER: This chart was created using Dragon dictation software. Efforts were made by me to ensure accuracy, however some errors may be present due to limitations of this technology and occasionally words are not transcribed correctly.         Ernst Lambert MD  02/05/20 3802

## 2020-02-05 NOTE — FLOWSHEET NOTE
02/05/20 1115   Encounter Summary   Services provided to: Patient   Referral/Consult From: Nurse   Support System Family members   Continue Visiting   (Not interested at this time in AD or LW.)   Complexity of Encounter Moderate   Length of Encounter 15 minutes   Spiritual Assessment Completed Yes   Advance Care Planning Yes   Routine   Type Initial   Assessment Approachable   Intervention Active listening;Explored feelings, thoughts, concerns;Nurtured hope;Sustaining presence/ Ministry of presence   Outcome Expressed gratitude;Engaged in conversation   Advance Directives (For Healthcare)   Healthcare Directive No, patient does not have an advance directive for healthcare treatment   Information on Healthcare Directives Requested Yes   Advance Directives Unable to complete; Documents given;Documents explained;Pt. not interested at this time

## 2020-02-05 NOTE — PROGRESS NOTES
Patient from ED, report from [facility]. Four eye assessment with Ken Constantino RN, see four eye note and assessment. Assessment completed and documented. VSS. A/ox4. C/o 4/10 left foot pain between 3rd and 4th toes. Left foot wound weeping, foam placed on wound and wrapped with kirlex, LLE elevated. Hx drug abuse. Uses urinal.  Denies further needs at this time. Bed locked and in lowest position. Bedside table and call light within reach. Will continue to monitor.

## 2020-02-06 ENCOUNTER — APPOINTMENT (OUTPATIENT)
Dept: MRI IMAGING | Age: 65
DRG: 638 | End: 2020-02-06
Payer: OTHER GOVERNMENT

## 2020-02-06 LAB
A/G RATIO: 1.3 (ref 1.1–2.2)
ALBUMIN SERPL-MCNC: 3 G/DL (ref 3.4–5)
ALP BLD-CCNC: 119 U/L (ref 40–129)
ALT SERPL-CCNC: 46 U/L (ref 10–40)
ANION GAP SERPL CALCULATED.3IONS-SCNC: 9 MMOL/L (ref 3–16)
AST SERPL-CCNC: 34 U/L (ref 15–37)
BASOPHILS ABSOLUTE: 0.1 K/UL (ref 0–0.2)
BASOPHILS RELATIVE PERCENT: 1.4 %
BILIRUB SERPL-MCNC: 0.4 MG/DL (ref 0–1)
BUN BLDV-MCNC: 13 MG/DL (ref 7–20)
CALCIUM SERPL-MCNC: 8.5 MG/DL (ref 8.3–10.6)
CHLORIDE BLD-SCNC: 100 MMOL/L (ref 99–110)
CO2: 29 MMOL/L (ref 21–32)
CREAT SERPL-MCNC: 0.8 MG/DL (ref 0.8–1.3)
EOSINOPHILS ABSOLUTE: 0.5 K/UL (ref 0–0.6)
EOSINOPHILS RELATIVE PERCENT: 10.3 %
GFR AFRICAN AMERICAN: >60
GFR NON-AFRICAN AMERICAN: >60
GLOBULIN: 2.4 G/DL
GLUCOSE BLD-MCNC: 133 MG/DL (ref 70–99)
GLUCOSE BLD-MCNC: 135 MG/DL (ref 70–99)
GLUCOSE BLD-MCNC: 188 MG/DL (ref 70–99)
GLUCOSE BLD-MCNC: 196 MG/DL (ref 70–99)
GLUCOSE BLD-MCNC: 229 MG/DL (ref 70–99)
GLUCOSE BLD-MCNC: 237 MG/DL (ref 70–99)
HCT VFR BLD CALC: 35.4 % (ref 40.5–52.5)
HEMOGLOBIN: 12.1 G/DL (ref 13.5–17.5)
LV EF: 55 %
LVEF MODALITY: NORMAL
LYMPHOCYTES ABSOLUTE: 1.3 K/UL (ref 1–5.1)
LYMPHOCYTES RELATIVE PERCENT: 26.5 %
MCH RBC QN AUTO: 29.2 PG (ref 26–34)
MCHC RBC AUTO-ENTMCNC: 34.2 G/DL (ref 31–36)
MCV RBC AUTO: 85.4 FL (ref 80–100)
MONOCYTES ABSOLUTE: 0.6 K/UL (ref 0–1.3)
MONOCYTES RELATIVE PERCENT: 13.1 %
NEUTROPHILS ABSOLUTE: 2.4 K/UL (ref 1.7–7.7)
NEUTROPHILS RELATIVE PERCENT: 48.7 %
PDW BLD-RTO: 13.9 % (ref 12.4–15.4)
PERFORMED ON: ABNORMAL
PLATELET # BLD: 219 K/UL (ref 135–450)
PMV BLD AUTO: 8.3 FL (ref 5–10.5)
POTASSIUM REFLEX MAGNESIUM: 4.1 MMOL/L (ref 3.5–5.1)
RBC # BLD: 4.15 M/UL (ref 4.2–5.9)
SODIUM BLD-SCNC: 138 MMOL/L (ref 136–145)
TOTAL PROTEIN: 5.4 G/DL (ref 6.4–8.2)
VANCOMYCIN TROUGH: 11.2 UG/ML (ref 10–20)
WBC # BLD: 4.9 K/UL (ref 4–11)

## 2020-02-06 PROCEDURE — 6360000002 HC RX W HCPCS: Performed by: NURSE PRACTITIONER

## 2020-02-06 PROCEDURE — 36415 COLL VENOUS BLD VENIPUNCTURE: CPT

## 2020-02-06 PROCEDURE — 6370000000 HC RX 637 (ALT 250 FOR IP): Performed by: INTERNAL MEDICINE

## 2020-02-06 PROCEDURE — 1200000000 HC SEMI PRIVATE

## 2020-02-06 PROCEDURE — 93306 TTE W/DOPPLER COMPLETE: CPT

## 2020-02-06 PROCEDURE — 80053 COMPREHEN METABOLIC PANEL: CPT

## 2020-02-06 PROCEDURE — 85025 COMPLETE CBC W/AUTO DIFF WBC: CPT

## 2020-02-06 PROCEDURE — 6360000002 HC RX W HCPCS: Performed by: INTERNAL MEDICINE

## 2020-02-06 PROCEDURE — 80202 ASSAY OF VANCOMYCIN: CPT

## 2020-02-06 PROCEDURE — 73718 MRI LOWER EXTREMITY W/O DYE: CPT

## 2020-02-06 PROCEDURE — 2580000003 HC RX 258: Performed by: INTERNAL MEDICINE

## 2020-02-06 PROCEDURE — 6370000000 HC RX 637 (ALT 250 FOR IP): Performed by: NURSE PRACTITIONER

## 2020-02-06 PROCEDURE — 2580000003 HC RX 258: Performed by: NURSE PRACTITIONER

## 2020-02-06 RX ORDER — BUPRENORPHINE AND NALOXONE 2; .5 MG/1; MG/1
1 FILM, SOLUBLE BUCCAL; SUBLINGUAL ONCE
Status: COMPLETED | OUTPATIENT
Start: 2020-02-06 | End: 2020-02-06

## 2020-02-06 RX ORDER — BUPRENORPHINE AND NALOXONE 2; .5 MG/1; MG/1
1 FILM, SOLUBLE BUCCAL; SUBLINGUAL DAILY
Status: DISCONTINUED | OUTPATIENT
Start: 2020-02-07 | End: 2020-02-07

## 2020-02-06 RX ORDER — MORPHINE SULFATE 2 MG/ML
2 INJECTION, SOLUTION INTRAMUSCULAR; INTRAVENOUS ONCE
Status: COMPLETED | OUTPATIENT
Start: 2020-02-06 | End: 2020-02-06

## 2020-02-06 RX ORDER — OXYCODONE HYDROCHLORIDE 5 MG/1
5 TABLET ORAL EVERY 6 HOURS PRN
Status: DISCONTINUED | OUTPATIENT
Start: 2020-02-06 | End: 2020-02-11 | Stop reason: HOSPADM

## 2020-02-06 RX ADMIN — BUPRENORPHINE HYDROCHLORIDE, NALOXONE HYDROCHLORIDE 1 FILM: 2; .5 FILM, SOLUBLE BUCCAL; SUBLINGUAL at 10:58

## 2020-02-06 RX ADMIN — SODIUM CHLORIDE: 9 INJECTION, SOLUTION INTRAVENOUS at 21:51

## 2020-02-06 RX ADMIN — PIPERACILLIN AND TAZOBACTAM 3.38 G: 3; .375 INJECTION, POWDER, LYOPHILIZED, FOR SOLUTION INTRAVENOUS at 14:48

## 2020-02-06 RX ADMIN — GABAPENTIN 800 MG: 400 CAPSULE ORAL at 10:58

## 2020-02-06 RX ADMIN — PIPERACILLIN AND TAZOBACTAM 3.38 G: 3; .375 INJECTION, POWDER, LYOPHILIZED, FOR SOLUTION INTRAVENOUS at 23:39

## 2020-02-06 RX ADMIN — Medication 10 ML: at 10:58

## 2020-02-06 RX ADMIN — SODIUM CHLORIDE: 9 INJECTION, SOLUTION INTRAVENOUS at 05:58

## 2020-02-06 RX ADMIN — VITAMIN D, TAB 1000IU (100/BT) 1000 UNITS: 25 TAB at 10:57

## 2020-02-06 RX ADMIN — DOXAZOSIN 1 MG: 2 TABLET ORAL at 10:57

## 2020-02-06 RX ADMIN — INSULIN LISPRO 6 UNITS: 100 INJECTION, SOLUTION INTRAVENOUS; SUBCUTANEOUS at 12:47

## 2020-02-06 RX ADMIN — INSULIN LISPRO 7 UNITS: 100 INJECTION, SOLUTION INTRAVENOUS; SUBCUTANEOUS at 12:47

## 2020-02-06 RX ADMIN — Medication 10 ML: at 21:18

## 2020-02-06 RX ADMIN — PIPERACILLIN AND TAZOBACTAM 3.38 G: 3; .375 INJECTION, POWDER, LYOPHILIZED, FOR SOLUTION INTRAVENOUS at 05:58

## 2020-02-06 RX ADMIN — VANCOMYCIN HYDROCHLORIDE 1250 MG: 10 INJECTION, POWDER, LYOPHILIZED, FOR SOLUTION INTRAVENOUS at 12:46

## 2020-02-06 RX ADMIN — ASPIRIN 81 MG 81 MG: 81 TABLET ORAL at 10:57

## 2020-02-06 RX ADMIN — INSULIN LISPRO 3 UNITS: 100 INJECTION, SOLUTION INTRAVENOUS; SUBCUTANEOUS at 19:05

## 2020-02-06 RX ADMIN — ENOXAPARIN SODIUM 40 MG: 40 INJECTION SUBCUTANEOUS at 10:57

## 2020-02-06 RX ADMIN — INSULIN LISPRO 7 UNITS: 100 INJECTION, SOLUTION INTRAVENOUS; SUBCUTANEOUS at 19:05

## 2020-02-06 RX ADMIN — INSULIN LISPRO 3 UNITS: 100 INJECTION, SOLUTION INTRAVENOUS; SUBCUTANEOUS at 21:16

## 2020-02-06 RX ADMIN — OXYCODONE 5 MG: 5 TABLET ORAL at 14:48

## 2020-02-06 RX ADMIN — Medication 1.5 G: at 21:49

## 2020-02-06 RX ADMIN — INSULIN GLARGINE 40 UNITS: 100 INJECTION, SOLUTION SUBCUTANEOUS at 21:16

## 2020-02-06 RX ADMIN — GABAPENTIN 800 MG: 400 CAPSULE ORAL at 14:47

## 2020-02-06 RX ADMIN — LISINOPRIL 10 MG: 10 TABLET ORAL at 10:58

## 2020-02-06 RX ADMIN — SIMVASTATIN 10 MG: 10 TABLET, FILM COATED ORAL at 21:16

## 2020-02-06 RX ADMIN — GABAPENTIN 800 MG: 400 CAPSULE ORAL at 21:16

## 2020-02-06 RX ADMIN — OXYCODONE 5 MG: 5 TABLET ORAL at 21:16

## 2020-02-06 RX ADMIN — MORPHINE SULFATE 2 MG: 2 INJECTION, SOLUTION INTRAMUSCULAR; INTRAVENOUS at 06:43

## 2020-02-06 ASSESSMENT — PAIN SCALES - GENERAL
PAINLEVEL_OUTOF10: 9
PAINLEVEL_OUTOF10: 8
PAINLEVEL_OUTOF10: 7

## 2020-02-06 NOTE — PROGRESS NOTES
Hospitalist Progress Note      PCP: Jessi Sotelo    Date of Admission: 2/4/2020    Chief Complaint on Admission:   Post-op Problem       Pt reports had surgery with Dr Kamini Polo approx 3 weeks ago on left foot following stepping on a nail and getting an infection. Pt reports over the past 24 hours the pain and reddness has returned and worsened, pt also reports blisters forming on left foot.         History Of Present Illness:  anything     59 y.o. male, with PMH of HTN, HLD, DM 2, polysubstance abuse, and left foot infection, who presented to [facility] with left foot infection. History was obtained from the patient review of the EMR. The patient states that he stepped on a nail over 4 weeks ago and was previously treated for infection in his foot between the third and fourth toe. He was seen by Dr. Kamini Polo, podiatry, at [facility] and had an I&D of this wound on 1/16/2020. He states that he was on antibiotics at [facility], vancomycin and Merrem, and was discharged on oral Cipro and linezolid as his previous wound culture showed MRSA, Klebsiella, and enterococcus. He states that his recovery has been going very well until last night when he noticed that the top of his foot was very red, blistering, and seeping out serous drainage. He states that even upon waking in the morning, his foot looked normal.  He decided to come into the ED for further evaluation as he did not want the infection to get worse. The patient denies having any fever or chills. No loss of sensation in the foot. He is able to wiggle his toes. Currently has a nonadherent pad with Kerlix wrapped around his left foot. The patient denied any other associated symptoms as well as any aggravating and/or alleviating factors. At the time of this assessment, the patient was resting comfortably in bed. He currently denies any chest pain, back pain, abdominal pain, shortness of breath, numbness, tingling, N/V/C/D, fever and/or chills. 02/04/20 2242 02/05/20  0537 02/06/20  0532   WBC 4.1 6.7 4.9   HGB 12.9* 11.6* 12.1*   HCT 38.9* 34.3* 35.4*    214 219      Recent Labs     02/04/20 2242 02/05/20  0537 02/06/20  0532   * 135* 138   K 4.5 4.3 4.1   CL 92* 101 100   CO2 25 26 29   BUN 12 9 13   CREATININE 0.8 0.7* 0.8     Recent Labs     02/04/20 2242 02/05/20  0537 02/06/20  0532   AST 46* 35 34   ALT 72* 60* 46*   BILITOT 0.3 <0.2 0.4   ALKPHOS 163* 129 119     No results for input(s): INR in the last 72 hours. No results for input(s): CKTOTAL, CKMB, CKMBINDEX, TROPONINI in the last 72 hours. Consults:     IP CONSULT TO HOSPITALIST  PHARMACY TO DOSE VANCOMYCIN  IP CONSULT TO PODIATRY  IP CONSULT TO SPIRITUAL SERVICES  IP CONSULT TO CASE MANAGEMENT    ASSESSMENT AND PLAN      Active Hospital Problems    Diagnosis Date Noted    Cellulitis of left foot [L03.116] 01/12/2020    Type 2 diabetes mellitus with hyperglycemia, with long-term current use of insulin (HCC) [E11.65, Z79.4]     Hyperlipidemia [E78.5]     Essential hypertension [I10]        Diabetic foot ulcer and cellulitis to left foot: Uncontrolled. +MRSA, klebsiella, and enterococcus on previous wound culture. (was discharged home on linezolid and cipro previously at [facility])  -Glycemic control  -Antibiotics: Vancomycin, Zosyn based on previous surgical cultures of MRSA and Klebsiella. -Left foot x-ray: No fracture or malalignment  -Contact precautions  -Podiatry consultation:  MRI ordered to eval for deep tissue abscess formation.   - MRI: 2/6/2020  1. Nonspecific diffuse subcutaneous edema.  Findings may reflect bland edema  versus cellulitis.  Correlate clinically.  No organized drainable collections  are identified. 2. No evidence for osteomyelitis.   3. Mild osteoarthritis.  -Local wound care, wound consultation  -Pain control: Judicious use of opiates.     Diabetes mellitus type 2 uncontrolled with hemoglobin A1c 13.8 on 1/12/2020  -Medications: Lantus, hyperglycemia  -IV fluid bolus completed  -Abs: BMP monitoring      DVT Prophylaxis: lovenox  Diet: DIET CARB CONTROL; Carb Control: 4 carb choices (60 gms)/meal  Code Status: Full Code     PT/OT Eval Status: No need identified     Dispo - Expect 1-2 days pending clinical response to medical therapy.      Matthew Diaz MD

## 2020-02-06 NOTE — CARE COORDINATION
CASE MANAGEMENT INITIAL ASSESSMENT      Reviewed chart and met with patient today, re: Triggered by age and diagnosis and inpatient consult to case management stating patient wants to go to skilled rehab  Explained Case Management role/services. To patient    Family present: None   Primary contact information: Fabby bolden [phone]    Admit date/status: inpatient 2/5/20  Diagnosis: Cellulitis of left foot  Is this a Readmission?: Yes, same diagnosis, unable to go to SNF or get home care last admission due to no coverage. Only has Brenna Zavala and per [place] home care issues in patient's home of IV drug use and family members asking home care providers for money- per chart review of previous encounter    Insurance: VA benefits only and 0% service conncected  Precert required for SNF - Must go through [place]      3 night stay required - N    Living arrangements, Adls, care needs, prior to admission: Lives at home alone in ranch style house, states he still drives    Transportation: TBD    Durable Medical Equipment at home: None    Services in the home and/or outpatient, prior to admission: None    PT/OT recs: Not seen yet this admission    Hospital Exemption Notification (HEN): N/A    Barriers to discharge: Not service connected and only has VA coverage/ barriers to SNF placement if needed and also IV heroin user used 24 hours prior to admission    Plan/comments: Spoke with patient about his request to go to rehab. He is aware he does not have coverage to go to a contracted [place] SNF . He states VA won't send him to rehab. Call placed to [place] and left message for Gordon Mcguire to call writer regarding options for patient at discharge if skilled rehab or home care is needed. Orders would have to come from [place] PCP Areli Almonte office # [phone]. Provided substance abuse resources to patient regarding his heroin use. Admittedly used 24 hour prior to admission per H&P.  Currently on IVABX's and will not be a candidate for

## 2020-02-06 NOTE — PROGRESS NOTES
Pt axo. Assessment completed as charted. Pt c/o pain. C/o dressing being uncomfortable. Educated pt on need to leave dressing in place. Pt amiable at this time. Will continue to monitor. Call light in reach.

## 2020-02-07 LAB
A/G RATIO: 1.3 (ref 1.1–2.2)
ALBUMIN SERPL-MCNC: 3.1 G/DL (ref 3.4–5)
ALP BLD-CCNC: 105 U/L (ref 40–129)
ALT SERPL-CCNC: 45 U/L (ref 10–40)
ANION GAP SERPL CALCULATED.3IONS-SCNC: 8 MMOL/L (ref 3–16)
AST SERPL-CCNC: 31 U/L (ref 15–37)
BASOPHILS ABSOLUTE: 0.1 K/UL (ref 0–0.2)
BASOPHILS RELATIVE PERCENT: 1.3 %
BILIRUB SERPL-MCNC: 0.4 MG/DL (ref 0–1)
BLOOD CULTURE, ROUTINE: ABNORMAL
BLOOD CULTURE, ROUTINE: ABNORMAL
BUN BLDV-MCNC: 14 MG/DL (ref 7–20)
CALCIUM SERPL-MCNC: 8.5 MG/DL (ref 8.3–10.6)
CHLORIDE BLD-SCNC: 95 MMOL/L (ref 99–110)
CO2: 31 MMOL/L (ref 21–32)
CREAT SERPL-MCNC: 0.9 MG/DL (ref 0.8–1.3)
EOSINOPHILS ABSOLUTE: 0.6 K/UL (ref 0–0.6)
EOSINOPHILS RELATIVE PERCENT: 11.2 %
GFR AFRICAN AMERICAN: >60
GFR NON-AFRICAN AMERICAN: >60
GLOBULIN: 2.3 G/DL
GLUCOSE BLD-MCNC: 174 MG/DL (ref 70–99)
GLUCOSE BLD-MCNC: 220 MG/DL (ref 70–99)
GLUCOSE BLD-MCNC: 248 MG/DL (ref 70–99)
GLUCOSE BLD-MCNC: 255 MG/DL (ref 70–99)
GLUCOSE BLD-MCNC: 270 MG/DL (ref 70–99)
GLUCOSE BLD-MCNC: 300 MG/DL (ref 70–99)
GLUCOSE BLD-MCNC: 60 MG/DL (ref 70–99)
GRAM STAIN RESULT: ABNORMAL
HCT VFR BLD CALC: 35.4 % (ref 40.5–52.5)
HEMOGLOBIN: 12.1 G/DL (ref 13.5–17.5)
LYMPHOCYTES ABSOLUTE: 1.4 K/UL (ref 1–5.1)
LYMPHOCYTES RELATIVE PERCENT: 28.7 %
MCH RBC QN AUTO: 29.2 PG (ref 26–34)
MCHC RBC AUTO-ENTMCNC: 34.2 G/DL (ref 31–36)
MCV RBC AUTO: 85.5 FL (ref 80–100)
MONOCYTES ABSOLUTE: 0.6 K/UL (ref 0–1.3)
MONOCYTES RELATIVE PERCENT: 12 %
NEUTROPHILS ABSOLUTE: 2.3 K/UL (ref 1.7–7.7)
NEUTROPHILS RELATIVE PERCENT: 46.8 %
ORGANISM: ABNORMAL
PDW BLD-RTO: 14.1 % (ref 12.4–15.4)
PERFORMED ON: ABNORMAL
PLATELET # BLD: 213 K/UL (ref 135–450)
PMV BLD AUTO: 8 FL (ref 5–10.5)
POTASSIUM REFLEX MAGNESIUM: 4.8 MMOL/L (ref 3.5–5.1)
RBC # BLD: 4.14 M/UL (ref 4.2–5.9)
SODIUM BLD-SCNC: 134 MMOL/L (ref 136–145)
TOTAL PROTEIN: 5.4 G/DL (ref 6.4–8.2)
WBC # BLD: 5 K/UL (ref 4–11)
WOUND/ABSCESS: ABNORMAL
WOUND/ABSCESS: ABNORMAL

## 2020-02-07 PROCEDURE — 2580000003 HC RX 258: Performed by: INTERNAL MEDICINE

## 2020-02-07 PROCEDURE — 36415 COLL VENOUS BLD VENIPUNCTURE: CPT

## 2020-02-07 PROCEDURE — 6370000000 HC RX 637 (ALT 250 FOR IP): Performed by: NURSE PRACTITIONER

## 2020-02-07 PROCEDURE — 1200000000 HC SEMI PRIVATE

## 2020-02-07 PROCEDURE — 6360000002 HC RX W HCPCS: Performed by: NURSE PRACTITIONER

## 2020-02-07 PROCEDURE — 80053 COMPREHEN METABOLIC PANEL: CPT

## 2020-02-07 PROCEDURE — 6370000000 HC RX 637 (ALT 250 FOR IP): Performed by: INTERNAL MEDICINE

## 2020-02-07 PROCEDURE — 85025 COMPLETE CBC W/AUTO DIFF WBC: CPT

## 2020-02-07 PROCEDURE — 6360000002 HC RX W HCPCS: Performed by: INTERNAL MEDICINE

## 2020-02-07 PROCEDURE — 99222 1ST HOSP IP/OBS MODERATE 55: CPT | Performed by: INTERNAL MEDICINE

## 2020-02-07 PROCEDURE — 87040 BLOOD CULTURE FOR BACTERIA: CPT

## 2020-02-07 RX ORDER — BUPRENORPHINE AND NALOXONE 2; .5 MG/1; MG/1
1 FILM, SOLUBLE BUCCAL; SUBLINGUAL ONCE
Status: COMPLETED | OUTPATIENT
Start: 2020-02-07 | End: 2020-02-07

## 2020-02-07 RX ORDER — SODIUM CHLORIDE 9 MG/ML
INJECTION, SOLUTION INTRAVENOUS
Status: DISPENSED
Start: 2020-02-07 | End: 2020-02-08

## 2020-02-07 RX ORDER — BUPRENORPHINE AND NALOXONE 2; .5 MG/1; MG/1
2 FILM, SOLUBLE BUCCAL; SUBLINGUAL DAILY
Status: DISCONTINUED | OUTPATIENT
Start: 2020-02-08 | End: 2020-02-09

## 2020-02-07 RX ADMIN — GABAPENTIN 800 MG: 400 CAPSULE ORAL at 21:24

## 2020-02-07 RX ADMIN — SIMVASTATIN 10 MG: 10 TABLET, FILM COATED ORAL at 21:24

## 2020-02-07 RX ADMIN — OXYCODONE 5 MG: 5 TABLET ORAL at 03:27

## 2020-02-07 RX ADMIN — GABAPENTIN 800 MG: 400 CAPSULE ORAL at 14:40

## 2020-02-07 RX ADMIN — PIPERACILLIN AND TAZOBACTAM 3.38 G: 3; .375 INJECTION, POWDER, LYOPHILIZED, FOR SOLUTION INTRAVENOUS at 05:10

## 2020-02-07 RX ADMIN — INSULIN LISPRO 3 UNITS: 100 INJECTION, SOLUTION INTRAVENOUS; SUBCUTANEOUS at 21:24

## 2020-02-07 RX ADMIN — VITAMIN D, TAB 1000IU (100/BT) 1000 UNITS: 25 TAB at 09:27

## 2020-02-07 RX ADMIN — BUPRENORPHINE HYDROCHLORIDE, NALOXONE HYDROCHLORIDE 1 FILM: 2; .5 FILM, SOLUBLE BUCCAL; SUBLINGUAL at 13:05

## 2020-02-07 RX ADMIN — LISINOPRIL 10 MG: 10 TABLET ORAL at 09:27

## 2020-02-07 RX ADMIN — OXYCODONE 5 MG: 5 TABLET ORAL at 23:15

## 2020-02-07 RX ADMIN — INSULIN LISPRO 12 UNITS: 100 INJECTION, SOLUTION INTRAVENOUS; SUBCUTANEOUS at 17:10

## 2020-02-07 RX ADMIN — INSULIN GLARGINE 40 UNITS: 100 INJECTION, SOLUTION SUBCUTANEOUS at 21:24

## 2020-02-07 RX ADMIN — BUPRENORPHINE HYDROCHLORIDE, NALOXONE HYDROCHLORIDE 1 FILM: 2; .5 FILM, SOLUBLE BUCCAL; SUBLINGUAL at 10:42

## 2020-02-07 RX ADMIN — OXYCODONE 5 MG: 5 TABLET ORAL at 17:15

## 2020-02-07 RX ADMIN — ASPIRIN 81 MG 81 MG: 81 TABLET ORAL at 09:27

## 2020-02-07 RX ADMIN — INSULIN LISPRO 7 UNITS: 100 INJECTION, SOLUTION INTRAVENOUS; SUBCUTANEOUS at 13:07

## 2020-02-07 RX ADMIN — GABAPENTIN 800 MG: 400 CAPSULE ORAL at 09:27

## 2020-02-07 RX ADMIN — Medication 1.5 G: at 12:26

## 2020-02-07 RX ADMIN — DOXAZOSIN 1 MG: 2 TABLET ORAL at 09:27

## 2020-02-07 RX ADMIN — INSULIN LISPRO 7 UNITS: 100 INJECTION, SOLUTION INTRAVENOUS; SUBCUTANEOUS at 17:16

## 2020-02-07 RX ADMIN — INSULIN LISPRO 3 UNITS: 100 INJECTION, SOLUTION INTRAVENOUS; SUBCUTANEOUS at 13:07

## 2020-02-07 RX ADMIN — INSULIN LISPRO 7 UNITS: 100 INJECTION, SOLUTION INTRAVENOUS; SUBCUTANEOUS at 08:53

## 2020-02-07 RX ADMIN — ENOXAPARIN SODIUM 40 MG: 40 INJECTION SUBCUTANEOUS at 09:26

## 2020-02-07 RX ADMIN — Medication 1.5 G: at 21:32

## 2020-02-07 RX ADMIN — INSULIN LISPRO 6 UNITS: 100 INJECTION, SOLUTION INTRAVENOUS; SUBCUTANEOUS at 08:50

## 2020-02-07 ASSESSMENT — PAIN SCALES - GENERAL
PAINLEVEL_OUTOF10: 6
PAINLEVEL_OUTOF10: 0
PAINLEVEL_OUTOF10: 8
PAINLEVEL_OUTOF10: 5
PAINLEVEL_OUTOF10: 8

## 2020-02-07 NOTE — PROGRESS NOTES
Pt is a/o x4. VSS. Assessment as charted.      - Pt has a healing IND incision on the plantar side of his LLE and blister/weeping r/t cellulitis on the dorsal side of his LLE that has alginate w/ ag and is ace wrapped. He also has scattered abrasions/ skin tears. He has a burn on his R thigh and a rash on his L groin. - Pt is complaining of pain in his LLE- PRN Sarai was given per MD order.  - Pt was upset that Podiatry was unable to come and see him today- Office was called w/ no answer.      Pt is currently resting in his bed that is locked and in its lowest position with his call light within reach, non-skid socks on, and bed alarm off d/t no staff assistance w/ ambulation. Pt denies any other needs at this time. Will continue to monitor.

## 2020-02-07 NOTE — PROGRESS NOTES
The patient does admit to previously not having cared about his health at all. He states that he used to not monitor his blood sugar or even take his medications. He is very motivated to start taking better care of himself.     Of note, the patient does state that he is an active drug user. He uses heroin and methamphetamine on an intermittent basis, last use being the day before yesterday. He denies ever having gone through any type of withdrawal symptoms. He states that he would like information about checking into rehab facility after discharge from the hospital.    Pt Seen/Examined and Chart Reviewed. Admitting dx: L foot cellulitis    SUBJECTIVE:   Patient complaining of uncontrolled pain. Was threatening to leave AMA if he could not have his pain controlled overnight. Continued discussion regarding his IV heroin use and he is adamant. Riki Brooks \" I do not have a problem and I am not addicted\". Discussed with patient options for pain control using Suboxone as opposed to opiates. Patient was concerned that if he took straight opiates after surgery for pain control, he would not be able to get away from his \"IVHeroin habit\". States that he is realizing he is having withdrawal sx today. Feels anxious, restless, and is irritable. Requesting revision of his suboxone medication.      2/5 Wound culture: MRSA+  2/5 Blood culture: Streptococcus+     Bedside rounding with nursing completed    OBJECTIVE:     Allergies  Keflex [cephalexin]    Medications      Scheduled Meds:   buprenorphine-naloxone  1 Film Sublingual Daily    vancomycin  1,500 mg Intravenous Q12H    aspirin  81 mg Oral Daily    gabapentin  800 mg Oral TID    insulin glargine  40 Units Subcutaneous Nightly    lisinopril  10 mg Oral Daily    simvastatin  10 mg Oral Nightly    doxazosin  1 mg Oral Daily    Vitamin D  1,000 Units Oral Daily    sodium chloride flush  10 mL Intravenous 2 times per day    enoxaparin  40 mg Subcutaneous Daily    normal and estimated at 38 mmHg   (right atrial pressure 15 mmHg).   The IVC is dilated in size (>2.1 cm) and collapses <50% with respiration   consistent with elevated right atrial pressures (15 mmHg) .   No obvious masses, thrombi, or vegetations are noted. Consider BENJAMIN if there   if clinically indicated. - Consult ID:  Appreciate recommendations. BENJAMIN, if clear then descalate to oral abx.       Essential hypertension and hyperlipidemia with no known coronary artery disease: Able without anginal equivalent  -Continue home medication regimen     Hyponatremia, Resolved, likely secondary to hyperglycemia  -IV fluid bolus completed  -Abs: BMP monitoring      DVT Prophylaxis: lovenox  Diet: DIET CARB CONTROL; Carb Control: 4 carb choices (60 gms)/meal  Code Status: Full Code     PT/OT Eval Status: No need identified     Dispo - Expect 1-2 days pending clinical response to medical therapy.      Tessa Johnson MD

## 2020-02-07 NOTE — CONSULTS
Infectious Diseases   Consult Note      Reason for Consult: foot infection, uncontrolled, DM, bacteremia, IVDU   Requesting Physician:   Brandon Lieberman MD      Date of Admission: 2/4/2020  Subjective:   CHIEF COMPLAINT:  None given       HPI:    Guillermo Craig is a 60yoM with history of uncontrolled DM (A1c 13.8 1/12/20), HTN, HLD  Opioid use disorder, last use of heroin was 2/5/20                Admitted 1/11-1/16/20 with L foot cellulitis and FB present. He reported stepping on a nail 2 weeks earlier. I&D of L foot abscess 1/13/20   He was discharged with 10 days cipro and linezolid    He thought the foot was improving. Woke on 2/4/20 and noted the foot to have small blisters with more erythema and pain. Returned to ER 2/4/20. WBC was wnl  MRI negative for abscess / OM. Wound culture with strep sp and MRSA  BC also positive for streptococci. TTE no vegetation. He is afebrile   He thinks the foot is improved. Current abx:  Zosyn 3.375 q8  vanc 1500 q12       Past Surgical History:       Diagnosis Date    Arthritis     Diabetes mellitus ([address]     Hx of blood clots     Hyperlipidemia     Hypertension     MRSA (methicillin resistant staph aureus) culture positive 01/12/2020    LEFT FOOT ULCER    Type 2 diabetes mellitus with foot ulcer, with long-term current use of insulin ([facility] 1/13/2020         Procedure Laterality Date    ANKLE FRACTURE SURGERY Right 11/10/2019    ORIF right ankle    ANKLE FRACTURE SURGERY Right 11/10/2019    ANKLE OPEN REDUCTION INTERNAL FIXATION performed by Marisela Villalobos DO at [address] [facility] N/A 1/13/2020    INCISION AND DEBRIDEMENT LEFT FOOT performed by Bebeto Shrestha DPM at [address] [facility]  01/13/2020    incision and debridement left foot       Social History:    TOBACCO:   reports that he has been smoking cigarettes. He has been smoking about 1.00 pack per day.  He has quit using smokeless tobacco.  ETOH:   reports no history of alcohol use. Family History:   History reviewed. No pertinent family history. There is no family history of autoimmune diseases or significant infectious diseases.       Current Medications:    Current Facility-Administered Medications: oxyCODONE (ROXICODONE) immediate release tablet 5 mg, 5 mg, Oral, Q6H PRN  perflutren lipid microspheres (DEFINITY) injection 1.65 mg, 1.5 mL, Intravenous, ONCE PRN  buprenorphine-naloxone (SUBOXONE) 2-0.5 MG SL film 1 Film, 1 Film, Sublingual, Daily  vancomycin 1.5 g in dextrose 5% 300 mL IVPB, 1,500 mg, Intravenous, Q12H  aspirin chewable tablet 81 mg, 81 mg, Oral, Daily  gabapentin (NEURONTIN) capsule 800 mg, 800 mg, Oral, TID  insulin glargine (LANTUS) injection vial 40 Units, 40 Units, Subcutaneous, Nightly  lisinopril (PRINIVIL;ZESTRIL) tablet 10 mg, 10 mg, Oral, Daily  simvastatin (ZOCOR) tablet 10 mg, 10 mg, Oral, Nightly  doxazosin (CARDURA) tablet 1 mg, 1 mg, Oral, Daily  Vitamin D (CHOLECALCIFEROL) tablet 1,000 Units, 1,000 Units, Oral, Daily  0.9 % sodium chloride infusion, , Intravenous, Continuous  sodium chloride flush 0.9 % injection 10 mL, 10 mL, Intravenous, 2 times per day  sodium chloride flush 0.9 % injection 10 mL, 10 mL, Intravenous, PRN  magnesium hydroxide (MILK OF MAGNESIA) 400 MG/5ML suspension 30 mL, 30 mL, Oral, Daily PRN  ondansetron (ZOFRAN) injection 4 mg, 4 mg, Intravenous, Q6H PRN  enoxaparin (LOVENOX) injection 40 mg, 40 mg, Subcutaneous, Daily  acetaminophen (TYLENOL) tablet 650 mg, 650 mg, Oral, Q4H PRN  glucose (GLUTOSE) 40 % oral gel 15 g, 15 g, Oral, PRN  dextrose 50 % IV solution, 12.5 g, Intravenous, PRN  glucagon (rDNA) injection 1 mg, 1 mg, Intramuscular, PRN  dextrose 5 % solution, 100 mL/hr, Intravenous, PRN  insulin lispro (HUMALOG) injection vial 7 Units, 0.08 Units/kg, Subcutaneous, TID WC  insulin lispro (HUMALOG) injection vial 0-18 Units, 0-18 Units, Subcutaneous, TID WC  insulin lispro LEFT WO CONTRAST   Final Result   1. Focus of susceptibility artifact in the plantar aspect of the 3rd   interdigital space corresponding to the foreign body seen on the recent   radiographs.  Possible underlying abscess versus phlegmon measuring   approximately 4 x 1.5 x 1 cm.  Diffuse cellulitis. 2. Very mild marrow edema in the 3rd proximal phalanx with normal T1 signal   compatible with noninfectious reactive osteitis.  Osteomyelitis is unlikely.           XR FOOT LEFT (MIN 3 VIEWS)   Final Result   2 mm radiopaque density within the plantar soft tissues of the forefoot   between the 3rd and 4th digit which may reflect a foreign body.  Mild soft   tissue swelling without acute osseous abnormality. Assessment:     Patient Active Problem List   Diagnosis    Syncope and collapse    Thyroid nodule    Syncope    Closed fracture of right ankle    Severe hyperglycemia due to diabetes mellitus (HCC)    Methamphetamine abuse (HCC)    Elevated d-dimer    Type 2 diabetes mellitus with hyperglycemia, with long-term current use of insulin ([address]    Essential hypertension    Hyperlipidemia    Septicemia ([address]    Cellulitis of left foot    Type 2 diabetes mellitus with foot ulcer, with long-term current use of insulin (HCC)    Cellulitis of toe of left foot       [facility] is a 61HMR with history of uncontrolled DM, opiod use disorder    Recent admission 1/2020 with L foot cellulitis, abscess, FB  S/p I&D. Mixed kimberly isolated, including strep sp, MRSA, E faecalis, Klebsiella    Admitted now with worsening cellulitis of the L foot  MRI negative for abscess and OM  Improved since admission  Wound culture with MRSA     Bacteremia   One of 2 sets of BC collected on admission with strep sp. He has a murmur  Index of suspicion of endovascular infection / SBE here in the context of IVDU is moderately high.   TTE was negative but BENJAMIN will be needed to fully exclude SBE    Allergy cephalexin Recs:  Continue IV vanc for MRSA soft tissue infection of the L foot. Oral abx at DC will be adequate. The vanc will also cover strep in the [facility]. Pharmacy is dosing the vanc   DC Zosyn    Get repeat [facility]  BENJAMIN will be needed. If negative, transition to po abx at DC will be appropriate  Check HIV, HAV/HBV/HCV    Discussed with patient   D/w RN   We will follow        Ana Luisa Burleson M.D. Thank you for the opportunity to participate in the care of your patient.     Please do not hesitate to contact me:   [phone] office  [phone] mobile

## 2020-02-07 NOTE — PLAN OF CARE
Problem: Falls - Risk of:  Goal: Will remain free from falls  Description  Will remain free from falls  Outcome: Ongoing  Goal: Absence of physical injury  Description  Absence of physical injury  Outcome: Ongoing     Problem: Infection:  Goal: Will remain free from infection  Description  Will remain free from infection  Outcome: Ongoing     Problem: Safety:  Goal: Free from accidental physical injury  Description  Free from accidental physical injury  Outcome: Ongoing  Goal: Free from intentional harm  Description  Free from intentional harm  Outcome: Ongoing     Problem: Daily Care:  Goal: Daily care needs are met  Description  Daily care needs are met  Outcome: Ongoing     Problem: Pain:  Goal: Patient's pain/discomfort is manageable  Description  Patient's pain/discomfort is manageable  Outcome: Ongoing

## 2020-02-07 NOTE — PROGRESS NOTES
Three [facility], Left Foot 2/4/2020:  1. No fracture or malalignment. 2. Interval foreign body removal.     MRI, Left Foot 2/6/2020:  1. Nonspecific diffuse subcutaneous edema.  Findings may reflect bland edema   versus cellulitis.  Correlate clinically.  No organized drainable collections   are identified. 2. No evidence for osteomyelitis. 3. Mild osteoarthritis. Assessment/Plan:   The patient is a pleasant 59 y.o. male with:  1) Ulcer with associated cellulitis, left foot  - Antibiotics per primary team  - X-ray - no acute pathology  - MRI - no organized drainable collections  - WBC 5.0  - ESR 17  - CRP 14.7  - Continue local wound care  - He may weight-bear as tolerated on the left  - The appearance of his foot has improved significantly. Okay for D/C from a Podiatry standpoint on PO antibiotics. He can follow up with his regular Podiatrists, Dr. Makenzie Walls  2) Uncontrolled DM2  - A1C on 1/12/2020 - 13.8  - Poor glycemic control will definitely slow healing.  Discussed this with the patient  [address], [place], [facility]  Office: [phone]  Cell: [phone]

## 2020-02-08 LAB
GLUCOSE BLD-MCNC: 119 MG/DL (ref 70–99)
GLUCOSE BLD-MCNC: 241 MG/DL (ref 70–99)
GLUCOSE BLD-MCNC: 325 MG/DL (ref 70–99)
GLUCOSE BLD-MCNC: 326 MG/DL (ref 70–99)
GLUCOSE BLD-MCNC: 328 MG/DL (ref 70–99)
PERFORMED ON: ABNORMAL
VANCOMYCIN TROUGH: 16.1 UG/ML (ref 10–20)

## 2020-02-08 PROCEDURE — 6370000000 HC RX 637 (ALT 250 FOR IP): Performed by: INTERNAL MEDICINE

## 2020-02-08 PROCEDURE — 86701 HIV-1ANTIBODY: CPT

## 2020-02-08 PROCEDURE — 1200000000 HC SEMI PRIVATE

## 2020-02-08 PROCEDURE — 6360000002 HC RX W HCPCS: Performed by: INTERNAL MEDICINE

## 2020-02-08 PROCEDURE — 6360000002 HC RX W HCPCS: Performed by: NURSE PRACTITIONER

## 2020-02-08 PROCEDURE — 86704 HEP B CORE ANTIBODY TOTAL: CPT

## 2020-02-08 PROCEDURE — 2580000003 HC RX 258: Performed by: INTERNAL MEDICINE

## 2020-02-08 PROCEDURE — 2580000003 HC RX 258: Performed by: NURSE PRACTITIONER

## 2020-02-08 PROCEDURE — 86803 HEPATITIS C AB TEST: CPT

## 2020-02-08 PROCEDURE — 99232 SBSQ HOSP IP/OBS MODERATE 35: CPT | Performed by: INTERNAL MEDICINE

## 2020-02-08 PROCEDURE — 86708 HEPATITIS A ANTIBODY: CPT

## 2020-02-08 PROCEDURE — 80202 ASSAY OF VANCOMYCIN: CPT

## 2020-02-08 PROCEDURE — 86706 HEP B SURFACE ANTIBODY: CPT

## 2020-02-08 PROCEDURE — 87340 HEPATITIS B SURFACE AG IA: CPT

## 2020-02-08 PROCEDURE — 6370000000 HC RX 637 (ALT 250 FOR IP): Performed by: NURSE PRACTITIONER

## 2020-02-08 PROCEDURE — 87390 HIV-1 AG IA: CPT

## 2020-02-08 PROCEDURE — 36415 COLL VENOUS BLD VENIPUNCTURE: CPT

## 2020-02-08 PROCEDURE — 86702 HIV-2 ANTIBODY: CPT

## 2020-02-08 RX ORDER — TRAMADOL HYDROCHLORIDE 50 MG/1
50 TABLET ORAL PRN
Status: ACTIVE | OUTPATIENT
Start: 2020-02-08 | End: 2020-02-11

## 2020-02-08 RX ORDER — CLONIDINE HYDROCHLORIDE 0.1 MG/1
0.1 TABLET ORAL PRN
Status: DISCONTINUED | OUTPATIENT
Start: 2020-02-08 | End: 2020-02-11 | Stop reason: HOSPADM

## 2020-02-08 RX ORDER — TRAZODONE HYDROCHLORIDE 50 MG/1
50 TABLET ORAL NIGHTLY PRN
Status: DISCONTINUED | OUTPATIENT
Start: 2020-02-08 | End: 2020-02-11 | Stop reason: HOSPADM

## 2020-02-08 RX ADMIN — SIMVASTATIN 10 MG: 10 TABLET, FILM COATED ORAL at 20:54

## 2020-02-08 RX ADMIN — INSULIN LISPRO 12 UNITS: 100 INJECTION, SOLUTION INTRAVENOUS; SUBCUTANEOUS at 16:40

## 2020-02-08 RX ADMIN — GABAPENTIN 800 MG: 400 CAPSULE ORAL at 14:30

## 2020-02-08 RX ADMIN — Medication 1.5 G: at 10:38

## 2020-02-08 RX ADMIN — INSULIN GLARGINE 40 UNITS: 100 INJECTION, SOLUTION SUBCUTANEOUS at 21:34

## 2020-02-08 RX ADMIN — LISINOPRIL 10 MG: 10 TABLET ORAL at 08:38

## 2020-02-08 RX ADMIN — ASPIRIN 81 MG 81 MG: 81 TABLET ORAL at 08:38

## 2020-02-08 RX ADMIN — Medication 1.5 G: at 21:39

## 2020-02-08 RX ADMIN — BUPRENORPHINE HYDROCHLORIDE, NALOXONE HYDROCHLORIDE 2 FILM: 2; .5 FILM, SOLUBLE BUCCAL; SUBLINGUAL at 08:35

## 2020-02-08 RX ADMIN — OXYCODONE 5 MG: 5 TABLET ORAL at 18:48

## 2020-02-08 RX ADMIN — VITAMIN D, TAB 1000IU (100/BT) 1000 UNITS: 25 TAB at 08:38

## 2020-02-08 RX ADMIN — INSULIN LISPRO 6 UNITS: 100 INJECTION, SOLUTION INTRAVENOUS; SUBCUTANEOUS at 12:17

## 2020-02-08 RX ADMIN — INSULIN LISPRO 7 UNITS: 100 INJECTION, SOLUTION INTRAVENOUS; SUBCUTANEOUS at 16:41

## 2020-02-08 RX ADMIN — TRAZODONE HYDROCHLORIDE 50 MG: 50 TABLET ORAL at 23:45

## 2020-02-08 RX ADMIN — INSULIN LISPRO 7 UNITS: 100 INJECTION, SOLUTION INTRAVENOUS; SUBCUTANEOUS at 08:41

## 2020-02-08 RX ADMIN — GABAPENTIN 800 MG: 400 CAPSULE ORAL at 20:54

## 2020-02-08 RX ADMIN — GABAPENTIN 800 MG: 400 CAPSULE ORAL at 08:38

## 2020-02-08 RX ADMIN — DOXAZOSIN 1 MG: 2 TABLET ORAL at 08:38

## 2020-02-08 RX ADMIN — INSULIN LISPRO 6 UNITS: 100 INJECTION, SOLUTION INTRAVENOUS; SUBCUTANEOUS at 21:35

## 2020-02-08 RX ADMIN — Medication 10 ML: at 21:00

## 2020-02-08 RX ADMIN — OXYCODONE 5 MG: 5 TABLET ORAL at 12:16

## 2020-02-08 RX ADMIN — ENOXAPARIN SODIUM 40 MG: 40 INJECTION SUBCUTANEOUS at 08:37

## 2020-02-08 RX ADMIN — Medication 10 ML: at 10:12

## 2020-02-08 RX ADMIN — INSULIN LISPRO 7 UNITS: 100 INJECTION, SOLUTION INTRAVENOUS; SUBCUTANEOUS at 12:17

## 2020-02-08 RX ADMIN — INSULIN HUMAN 5 UNITS: 100 INJECTION, SOLUTION PARENTERAL at 03:02

## 2020-02-08 ASSESSMENT — PAIN SCALES - GENERAL
PAINLEVEL_OUTOF10: 7
PAINLEVEL_OUTOF10: 8
PAINLEVEL_OUTOF10: 8

## 2020-02-08 NOTE — PROGRESS NOTES
Hospitalist Progress Note      PCP: Melodi Gitelman    Date of Admission: 2/4/2020    Chief Complaint on Admission:   Post-op Problem       Pt reports had surgery with Dr Henrique Cronin approx 3 weeks ago on left foot following stepping on a nail and getting an infection. Pt reports over the past 24 hours the pain and reddness has returned and worsened, pt also reports blisters forming on left foot.         History Of Present Illness:  anything     59 y.o. male, with PMH of HTN, HLD, DM 2, polysubstance abuse, and left foot infection, who presented to [facility] with left foot infection. History was obtained from the patient review of the EMR. The patient states that he stepped on a nail over 4 weeks ago and was previously treated for infection in his foot between the third and fourth toe. He was seen by Dr. Henrique Cronin, podiatry, at [facility] and had an I&D of this wound on 1/16/2020. He states that he was on antibiotics at [facility], vancomycin and Merrem, and was discharged on oral Cipro and linezolid as his previous wound culture showed MRSA, Klebsiella, and enterococcus. He states that his recovery has been going very well until last night when he noticed that the top of his foot was very red, blistering, and seeping out serous drainage. He states that even upon waking in the morning, his foot looked normal.  He decided to come into the ED for further evaluation as he did not want the infection to get worse. The patient denies having any fever or chills. No loss of sensation in the foot. He is able to wiggle his toes. Currently has a nonadherent pad with Kerlix wrapped around his left foot. The patient denied any other associated symptoms as well as any aggravating and/or alleviating factors. At the time of this assessment, the patient was resting comfortably in bed. He currently denies any chest pain, back pain, abdominal pain, shortness of breath, numbness, tingling, N/V/C/D, fever and/or chills. insight  Capillary Refill: Brisk,< 3 seconds   Peripheral Pulses: +2 palpable, equal bilaterally       Data    Recent Labs     02/06/20  0532 02/07/20  0534   WBC 4.9 5.0   HGB 12.1* 12.1*   HCT 35.4* 35.4*    213      Recent Labs     02/06/20  0532 02/07/20  0534    134*   K 4.1 4.8    95*   CO2 29 31   BUN 13 14   CREATININE 0.8 0.9     Recent Labs     02/06/20  0532 02/07/20  0534   AST 34 31   ALT 46* 45*   BILITOT 0.4 0.4   ALKPHOS 119 105     No results for input(s): INR in the last 72 hours. No results for input(s): CKTOTAL, CKMB, CKMBINDEX, TROPONINI in the last 72 hours. Consults:     IP CONSULT TO HOSPITALIST  PHARMACY TO DOSE VANCOMYCIN  IP CONSULT TO PODIATRY  IP CONSULT TO SPIRITUAL SERVICES  IP CONSULT TO CASE MANAGEMENT  IP CONSULT TO INFECTIOUS DISEASES    ASSESSMENT AND PLAN      Active Hospital Problems    Diagnosis Date Noted    Cellulitis of left foot [L03.116] 01/12/2020    Type 2 diabetes mellitus with hyperglycemia, with long-term current use of insulin (HCC) [E11.65, Z79.4]     Hyperlipidemia [E78.5]     Essential hypertension [I10]        Diabetic foot ulcer and cellulitis to left foot: Uncontrolled. +MRSA, klebsiella, and enterococcus on previous wound culture. (was discharged home on linezolid and cipro previously at [facility])  - Micro: Wound culture: + MRSA 2/5/20  - Optimize Glycemic control  -Antibiotics: Vancomycin:   -Left foot x-ray: No fracture or malalignment  -Podiatry consultation:  MRI ordered to eval for deep tissue abscess formation. No evidence of abscess formation. Podiatry sign off. FU as outpt. - MRI: 2/6/2020  1. Nonspecific diffuse subcutaneous edema.  Findings may reflect bland edema  versus cellulitis.  Correlate clinically.  No organized drainable collections  are identified. 2. No evidence for osteomyelitis. 3. Mild osteoarthritis.  -Local wound care, wound consultation  -Pain control: Judicious use of opiates while using Suboxone.

## 2020-02-08 NOTE — PROGRESS NOTES
Pt stated that he wanted his dressing changed and does not want Dr. Chay Hardy to be his podiatrist and was upset that the wound was not cleaned with normal saline. Physican stated that he wants to keep foot as dry as possible. Physician is aware of pt not wanting him to be his podiatrist anymore and he is the only one on-call for podiatry. This nurse changed dressing and cleaned foot with normal saline. Pt was stating that he would change dressing himself. Suzanne

## 2020-02-08 NOTE — PROGRESS NOTES
Infectious Disease Follow up Notes    CC :  Bacteremia, L DFI, IVDU       Antibiotics:   vanc 1500 q12    Admit Date:   2/4/2020  Hospital Day: 5    Subjective:   He remains afebrile   His L foot is feeling more \"sore\" today. He is otherwise feeling about the same, no cough, chest pain, SOB. No GI/ complaints. Objective:     Patient Vitals for the past 8 hrs:   BP Temp Temp src Pulse Resp SpO2   02/08/20 0831 (!) 148/85 97.6 °F (36.4 °C) Oral 90 18 98 %       EXAM:  General:  Alert, flat affect. NAD     HEENT:  PERRL, sclera anicteric. Poor dentition. No thrush    NECK:  supple, no LAD   LUNGS:  CTA cesar, no W/R/R  CV:   RRR with unchanged murmur  ABD:  Soft, flat, NT. Nl bowel sounds   EXT:  No LE edema. Very faint erythema lower L Leg  Distal L foot with resolving erythema, increased desquamation. No purulence.     SKIN: No acute changes      LINE:  PIV site ok       Scheduled Meds:   buprenorphine-naloxone  2 Film Sublingual Daily    vancomycin  1,500 mg Intravenous Q12H    aspirin  81 mg Oral Daily    gabapentin  800 mg Oral TID    insulin glargine  40 Units Subcutaneous Nightly    lisinopril  10 mg Oral Daily    simvastatin  10 mg Oral Nightly    doxazosin  1 mg Oral Daily    Vitamin D  1,000 Units Oral Daily    sodium chloride flush  10 mL Intravenous 2 times per day    enoxaparin  40 mg Subcutaneous Daily    insulin lispro  0.08 Units/kg Subcutaneous TID WC    insulin lispro  0-18 Units Subcutaneous TID WC    insulin lispro  0-9 Units Subcutaneous Nightly       Continuous Infusions:   dextrose            Data Review:    Lab Results   Component Value Date    WBC 5.0 02/07/2020    HGB 12.1 (L) 02/07/2020    HCT 35.4 (L) 02/07/2020    MCV 85.5 02/07/2020     02/07/2020     Lab Results   Component Value Date    CREATININE 0.9 02/07/2020    BUN 14 02/07/2020     (L) 02/07/2020    K 4.8 Cellulitis of left foot 01/12/2020    Syncope 11/08/2019    Closed fracture of right ankle     Severe hyperglycemia due to diabetes mellitus (HCC)     [facility]     Elevated d-dimer     Type 2 diabetes mellitus with hyperglycemia, with long-term current use of insulin ([facility] [facility]     Essential hypertension     Hyperlipidemia     Syncope and collapse 05/31/2018    Thyroid nodule 05/31/2018     Uncontrolled DM    Opioid use disorder     Recent admission 1/2020 with L foot cellulitis / abscess / FB  S/p I&D 1/13/20. Mixed kimberly isolated, including strep sp, MRSA, E faecalis, Klebsiella     Readmitted 2/4/20 with worsening cellulitis of the L foot  MRI negative for abscess and OM  Wound culture with MRSA      Bacteremia, 1/2 sets of BC collected on admission with strep sp.    +Murmur  TTE negative  Repeat BC negative to date      Allergy cephalexin     Plan:   Continue IV vanc for goal trough around 15. Pharmacy is dosing  Repeat BC are in process, pending. He remains clinically stable  BENJAMIN planned for Monday. Home on po abx if negative, probably clindamycin. If BENJAMIN abnormal, would need a course of IV abx in supervised setting.   Continue local wound care to the L foot which continues to improve      Discussed with patient/family, all questions answered        Onesimo Cottrell MD  Phone: [phone]   Fax : [phone]

## 2020-02-08 NOTE — PROGRESS NOTES
Pt a/o; vss; assessment complete and charted; no needs or complaints at this time; call light in reach; will monitor

## 2020-02-09 LAB
CULTURE, BLOOD 2: NORMAL
GLUCOSE BLD-MCNC: 159 MG/DL (ref 70–99)
GLUCOSE BLD-MCNC: 179 MG/DL (ref 70–99)
GLUCOSE BLD-MCNC: 215 MG/DL (ref 70–99)
GLUCOSE BLD-MCNC: 246 MG/DL (ref 70–99)
GLUCOSE BLD-MCNC: 301 MG/DL (ref 70–99)
HAV AB SERPL IA-ACNC: NEGATIVE
HBV SURFACE AB TITR SER: >1000 MIU/ML
HEPATITIS B SURFACE ANTIGEN INTERPRETATION: NORMAL
HEPATITIS C ANTIBODY INTERPRETATION: REACTIVE
HIV AG/AB: NORMAL
HIV ANTIGEN: NORMAL
HIV-1 ANTIBODY: NORMAL
HIV-2 AB: NORMAL
PERFORMED ON: ABNORMAL

## 2020-02-09 PROCEDURE — 6360000002 HC RX W HCPCS: Performed by: INTERNAL MEDICINE

## 2020-02-09 PROCEDURE — 1200000000 HC SEMI PRIVATE

## 2020-02-09 PROCEDURE — 2580000003 HC RX 258: Performed by: INTERNAL MEDICINE

## 2020-02-09 PROCEDURE — 2580000003 HC RX 258: Performed by: NURSE PRACTITIONER

## 2020-02-09 PROCEDURE — 6370000000 HC RX 637 (ALT 250 FOR IP): Performed by: INTERNAL MEDICINE

## 2020-02-09 PROCEDURE — 6370000000 HC RX 637 (ALT 250 FOR IP): Performed by: NURSE PRACTITIONER

## 2020-02-09 PROCEDURE — 6360000002 HC RX W HCPCS: Performed by: NURSE PRACTITIONER

## 2020-02-09 RX ORDER — BUPRENORPHINE AND NALOXONE 2; .5 MG/1; MG/1
1 FILM, SOLUBLE BUCCAL; SUBLINGUAL ONCE
Status: COMPLETED | OUTPATIENT
Start: 2020-02-09 | End: 2020-02-09

## 2020-02-09 RX ORDER — BUPRENORPHINE AND NALOXONE 2; .5 MG/1; MG/1
3 FILM, SOLUBLE BUCCAL; SUBLINGUAL DAILY
Status: DISCONTINUED | OUTPATIENT
Start: 2020-02-10 | End: 2020-02-11 | Stop reason: HOSPADM

## 2020-02-09 RX ADMIN — OXYCODONE 5 MG: 5 TABLET ORAL at 15:48

## 2020-02-09 RX ADMIN — GABAPENTIN 800 MG: 400 CAPSULE ORAL at 15:46

## 2020-02-09 RX ADMIN — BUPRENORPHINE HYDROCHLORIDE, NALOXONE HYDROCHLORIDE 1 FILM: 2; .5 FILM, SOLUBLE BUCCAL; SUBLINGUAL at 10:03

## 2020-02-09 RX ADMIN — Medication 1.5 G: at 09:57

## 2020-02-09 RX ADMIN — LISINOPRIL 10 MG: 10 TABLET ORAL at 08:15

## 2020-02-09 RX ADMIN — INSULIN LISPRO 10 UNITS: 100 INJECTION, SOLUTION INTRAVENOUS; SUBCUTANEOUS at 17:46

## 2020-02-09 RX ADMIN — INSULIN LISPRO 12 UNITS: 100 INJECTION, SOLUTION INTRAVENOUS; SUBCUTANEOUS at 12:23

## 2020-02-09 RX ADMIN — OXYCODONE 5 MG: 5 TABLET ORAL at 23:44

## 2020-02-09 RX ADMIN — VITAMIN D, TAB 1000IU (100/BT) 1000 UNITS: 25 TAB at 08:15

## 2020-02-09 RX ADMIN — Medication 1.5 G: at 21:29

## 2020-02-09 RX ADMIN — ASPIRIN 81 MG 81 MG: 81 TABLET ORAL at 08:15

## 2020-02-09 RX ADMIN — INSULIN LISPRO 10 UNITS: 100 INJECTION, SOLUTION INTRAVENOUS; SUBCUTANEOUS at 12:24

## 2020-02-09 RX ADMIN — INSULIN LISPRO 3 UNITS: 100 INJECTION, SOLUTION INTRAVENOUS; SUBCUTANEOUS at 08:16

## 2020-02-09 RX ADMIN — GABAPENTIN 800 MG: 400 CAPSULE ORAL at 08:15

## 2020-02-09 RX ADMIN — Medication 10 ML: at 08:16

## 2020-02-09 RX ADMIN — INSULIN LISPRO 3 UNITS: 100 INJECTION, SOLUTION INTRAVENOUS; SUBCUTANEOUS at 17:46

## 2020-02-09 RX ADMIN — TRAZODONE HYDROCHLORIDE 50 MG: 50 TABLET ORAL at 23:41

## 2020-02-09 RX ADMIN — SIMVASTATIN 10 MG: 10 TABLET, FILM COATED ORAL at 21:23

## 2020-02-09 RX ADMIN — INSULIN LISPRO 7 UNITS: 100 INJECTION, SOLUTION INTRAVENOUS; SUBCUTANEOUS at 08:16

## 2020-02-09 RX ADMIN — INSULIN LISPRO 3 UNITS: 100 INJECTION, SOLUTION INTRAVENOUS; SUBCUTANEOUS at 21:23

## 2020-02-09 RX ADMIN — DOXAZOSIN 1 MG: 2 TABLET ORAL at 08:15

## 2020-02-09 RX ADMIN — BUPRENORPHINE HYDROCHLORIDE, NALOXONE HYDROCHLORIDE 2 FILM: 2; .5 FILM, SOLUBLE BUCCAL; SUBLINGUAL at 08:14

## 2020-02-09 RX ADMIN — INSULIN GLARGINE 40 UNITS: 100 INJECTION, SOLUTION SUBCUTANEOUS at 21:22

## 2020-02-09 RX ADMIN — ENOXAPARIN SODIUM 40 MG: 40 INJECTION SUBCUTANEOUS at 08:15

## 2020-02-09 RX ADMIN — GABAPENTIN 800 MG: 400 CAPSULE ORAL at 21:23

## 2020-02-09 RX ADMIN — Medication 10 ML: at 21:30

## 2020-02-09 ASSESSMENT — PAIN SCALES - GENERAL
PAINLEVEL_OUTOF10: 9
PAINLEVEL_OUTOF10: 7

## 2020-02-09 NOTE — PROGRESS NOTES
Pt a/o; vss; assessment complete and charted; pt anxious about returning home, son does drugs and does not want to be around him; reassured pt regarding help with rehab options; call light in reach; will monitor

## 2020-02-09 NOTE — PROGRESS NOTES
Hospitalist Progress Note      PCP: Dago Gonzalez    Date of Admission: 2/4/2020    Chief Complaint on Admission:   Post-op Problem       Pt reports had surgery with Dr Marcellus Blancas approx 3 weeks ago on left foot following stepping on a nail and getting an infection. Pt reports over the past 24 hours the pain and reddness has returned and worsened, pt also reports blisters forming on left foot.         History Of Present Illness:  anything     59 y.o. male, with PMH of HTN, HLD, DM 2, polysubstance abuse, and left foot infection, who presented to [facility] with left foot infection. History was obtained from the patient review of the EMR. The patient states that he stepped on a nail over 4 weeks ago and was previously treated for infection in his foot between the third and fourth toe. He was seen by Dr. Marcellus Blancas, podiatry, at [facility] and had an I&D of this wound on 1/16/2020. He states that he was on antibiotics at [facility], vancomycin and Merrem, and was discharged on oral Cipro and linezolid as his previous wound culture showed MRSA, Klebsiella, and enterococcus. He states that his recovery has been going very well until last night when he noticed that the top of his foot was very red, blistering, and seeping out serous drainage. He states that even upon waking in the morning, his foot looked normal.  He decided to come into the ED for further evaluation as he did not want the infection to get worse. The patient denies having any fever or chills. No loss of sensation in the foot. He is able to wiggle his toes. Currently has a nonadherent pad with Kerlix wrapped around his left foot. The patient denied any other associated symptoms as well as any aggravating and/or alleviating factors. At the time of this assessment, the patient was resting comfortably in bed. He currently denies any chest pain, back pain, abdominal pain, shortness of breath, numbness, tingling, N/V/C/D, fever and/or chills. Output --   Net 1790 ml       Vitals    /71   Pulse 92   Temp 98.6 °F (37 °C) (Oral)   Resp 16   Ht 6' 1\" (1.854 m)   Wt 200 lb 6.4 oz (90.9 kg) Comment: 2 heated blankets, sheet, 2 pillows  SpO2 94%   BMI 26.44 kg/m²     Exam:  General appearance:  No apparent distress, appears stated age and cooperative. Disheveled appearance  HEENT:  Normal cephalic, atraumatic without obvious deformity. Pupils equal, round, and reactive to light. Extra ocular muscles intact. Conjunctivae/corneas clear. Poor dentition  Neck: Supple, with full range of motion. No jugular venous distention. Trachea midline. Respiratory:  Normal respiratory effort. Clear to auscultation, bilaterally without Rales/Wheezes/Rhonchi. Cardiovascular:  Regular rate and rhythm with normal S1/S2 without murmurs, rubs or gallops. Abdomen: Soft, non-tender, non-distended with normal bowel sounds. Musculoskeletal:  No clubbing, cyanosis or edema bilaterally. Full range of motion without deformity. Skin: Skin color, texture, turgor normal.  No rashes or lesions. Left foot: Increased edema to level of ankle, +2 pitting. Brawny skin changes with wrinkling and blister formation with active weeping of serous discharge to forefoot. Neurologic:  Neurovascularly intact without any focal sensory/motor deficits. Cranial nerves: II-XII intact, grossly non-focal.  Psychiatric:  Alert and oriented, thought content appropriate, normal insight  Capillary Refill: Brisk,< 3 seconds   Peripheral Pulses: +2 palpable, equal bilaterally       Data    Recent Labs     02/07/20  0534   WBC 5.0   HGB 12.1*   HCT 35.4*         Recent Labs     02/07/20  0534   *   K 4.8   CL 95*   CO2 31   BUN 14   CREATININE 0.9     Recent Labs     02/07/20  0534   AST 31   ALT 45*   BILITOT 0.4   ALKPHOS 105     No results for input(s): INR in the last 72 hours. No results for input(s): CKTOTAL, CKMB, CKMBINDEX, TROPONINI in the last 72 hours.     Consults: irritability, anxiousness, and restlessness. -MAT initiated due to poorly controlled pain and concern for heroin relapse. Bup/Nal started 4 mg for relapse prevention and harm reduction. Increased to 6mg for worsened withdrawal symptoms. Cont to reassess with COWS.   - Judicious use of opiates for pain control. Oxycodone 5 mg Q4.  - Monitor for sx of acute withdrawal   -Recommend starting patient with counseling services in an addiction clinic upon discharge. Please have patient upon discharge go to Addiction facility of choice for induction of Suboxone for treatment. Bacteremia:  Streptococcus+. New finding on blood cultures. - Await final ID and sensitivity  -Abx: maintain Zosyn  - ECHO: 2/6/2020  Normal left ventricle size and systolic function with a visually estimated   ejection fraction of 55%.   Mild concentric left ventricular hypertrophy.   No regional wall motion abnormalities are seen.   Normal left ventricular diastolic filling pressure.   The right atrium is mildly enlarged.   The aortic root is dilated at 4.2 cm.   Mild mitral, aortic, pulmonic, and tricuspid regurgitation.   Systolic pulmonary artery pressure (SPAP) is normal and estimated at 38 mmHg   (right atrial pressure 15 mmHg).   The IVC is dilated in size (>2.1 cm) and collapses <50% with respiration   consistent with elevated right atrial pressures (15 mmHg) .   No obvious masses, thrombi, or vegetations are noted. Consider BENJAMIN if there   if clinically indicated. - Consult ID:  Appreciate recommendations.  BENJAMIN planned for Monday, if clear then descalate to oral abx.       Essential hypertension and hyperlipidemia with no known coronary artery disease: stable without anginal equivalent  -Continue home medication regimen     Hyponatremia, Resolved, likely secondary to hyperglycemia  -IV fluid bolus completed  -Abs: BMP monitoring    DVT Prophylaxis: lovenox  Diet: DIET CARB CONTROL; Carb Control: 4 carb choices (60 gms)/meal  Code

## 2020-02-10 ENCOUNTER — ANESTHESIA (OUTPATIENT)
Dept: CARDIAC CATH/INVASIVE PROCEDURES | Age: 65
DRG: 638 | End: 2020-02-10
Payer: OTHER GOVERNMENT

## 2020-02-10 VITALS — SYSTOLIC BLOOD PRESSURE: 127 MMHG | DIASTOLIC BLOOD PRESSURE: 79 MMHG

## 2020-02-10 LAB
GLUCOSE BLD-MCNC: 181 MG/DL (ref 70–99)
GLUCOSE BLD-MCNC: 187 MG/DL (ref 70–99)
GLUCOSE BLD-MCNC: 238 MG/DL (ref 70–99)
GLUCOSE BLD-MCNC: 276 MG/DL (ref 70–99)
GLUCOSE BLD-MCNC: 314 MG/DL (ref 70–99)
GLUCOSE BLD-MCNC: 326 MG/DL (ref 70–99)
HEPATITIS B CORE TOTAL ANTIBODY: POSITIVE
LV EF: 58 %
LVEF MODALITY: NORMAL
PERFORMED ON: ABNORMAL

## 2020-02-10 PROCEDURE — B24BZZ4 ULTRASONOGRAPHY OF HEART WITH AORTA, TRANSESOPHAGEAL: ICD-10-PCS | Performed by: INTERNAL MEDICINE

## 2020-02-10 PROCEDURE — 93320 DOPPLER ECHO COMPLETE: CPT

## 2020-02-10 PROCEDURE — 2580000003 HC RX 258

## 2020-02-10 PROCEDURE — 1200000000 HC SEMI PRIVATE

## 2020-02-10 PROCEDURE — 2580000003 HC RX 258: Performed by: NURSE PRACTITIONER

## 2020-02-10 PROCEDURE — 6360000002 HC RX W HCPCS: Performed by: NURSE PRACTITIONER

## 2020-02-10 PROCEDURE — 6370000000 HC RX 637 (ALT 250 FOR IP): Performed by: NURSE PRACTITIONER

## 2020-02-10 PROCEDURE — 99231 SBSQ HOSP IP/OBS SF/LOW 25: CPT | Performed by: INTERNAL MEDICINE

## 2020-02-10 PROCEDURE — 2580000003 HC RX 258: Performed by: NURSE ANESTHETIST, CERTIFIED REGISTERED

## 2020-02-10 PROCEDURE — 2580000003 HC RX 258: Performed by: INTERNAL MEDICINE

## 2020-02-10 PROCEDURE — 6360000002 HC RX W HCPCS: Performed by: INTERNAL MEDICINE

## 2020-02-10 PROCEDURE — 2500000003 HC RX 250 WO HCPCS: Performed by: NURSE ANESTHETIST, CERTIFIED REGISTERED

## 2020-02-10 PROCEDURE — 6370000000 HC RX 637 (ALT 250 FOR IP): Performed by: INTERNAL MEDICINE

## 2020-02-10 PROCEDURE — 93315 ECHO TRANSESOPHAGEAL: CPT

## 2020-02-10 PROCEDURE — 6360000002 HC RX W HCPCS: Performed by: NURSE ANESTHETIST, CERTIFIED REGISTERED

## 2020-02-10 PROCEDURE — 93325 DOPPLER ECHO COLOR FLOW MAPG: CPT

## 2020-02-10 RX ORDER — SODIUM CHLORIDE 9 MG/ML
INJECTION, SOLUTION INTRAVENOUS CONTINUOUS PRN
Status: DISCONTINUED | OUTPATIENT
Start: 2020-02-10 | End: 2020-02-10 | Stop reason: SDUPTHER

## 2020-02-10 RX ORDER — SODIUM CHLORIDE 0.9 % (FLUSH) 0.9 %
10 SYRINGE (ML) INJECTION PRN
Status: DISCONTINUED | OUTPATIENT
Start: 2020-02-10 | End: 2020-02-10 | Stop reason: SDUPTHER

## 2020-02-10 RX ORDER — LIDOCAINE HYDROCHLORIDE 10 MG/ML
INJECTION, SOLUTION INFILTRATION; PERINEURAL PRN
Status: DISCONTINUED | OUTPATIENT
Start: 2020-02-10 | End: 2020-02-10 | Stop reason: SDUPTHER

## 2020-02-10 RX ORDER — SODIUM CHLORIDE 0.9 % (FLUSH) 0.9 %
10 SYRINGE (ML) INJECTION EVERY 12 HOURS SCHEDULED
Status: DISCONTINUED | OUTPATIENT
Start: 2020-02-10 | End: 2020-02-11 | Stop reason: HOSPADM

## 2020-02-10 RX ORDER — PROPOFOL 10 MG/ML
INJECTION, EMULSION INTRAVENOUS PRN
Status: DISCONTINUED | OUTPATIENT
Start: 2020-02-10 | End: 2020-02-10 | Stop reason: SDUPTHER

## 2020-02-10 RX ADMIN — Medication 10 ML: at 21:22

## 2020-02-10 RX ADMIN — DOXAZOSIN 1 MG: 2 TABLET ORAL at 15:15

## 2020-02-10 RX ADMIN — OXYCODONE 5 MG: 5 TABLET ORAL at 18:11

## 2020-02-10 RX ADMIN — Medication 10 ML: at 10:07

## 2020-02-10 RX ADMIN — LIDOCAINE HYDROCHLORIDE 40 MG: 10 INJECTION, SOLUTION INFILTRATION; PERINEURAL at 13:15

## 2020-02-10 RX ADMIN — SODIUM CHLORIDE: 9 INJECTION, SOLUTION INTRAVENOUS at 12:54

## 2020-02-10 RX ADMIN — VITAMIN D, TAB 1000IU (100/BT) 1000 UNITS: 25 TAB at 15:15

## 2020-02-10 RX ADMIN — INSULIN GLARGINE 40 UNITS: 100 INJECTION, SOLUTION SUBCUTANEOUS at 21:33

## 2020-02-10 RX ADMIN — INSULIN LISPRO 12 UNITS: 100 INJECTION, SOLUTION INTRAVENOUS; SUBCUTANEOUS at 17:24

## 2020-02-10 RX ADMIN — ASPIRIN 81 MG 81 MG: 81 TABLET ORAL at 15:14

## 2020-02-10 RX ADMIN — BUPRENORPHINE HYDROCHLORIDE, NALOXONE HYDROCHLORIDE 3 FILM: 2; .5 FILM, SOLUBLE BUCCAL; SUBLINGUAL at 15:14

## 2020-02-10 RX ADMIN — GABAPENTIN 800 MG: 400 CAPSULE ORAL at 21:21

## 2020-02-10 RX ADMIN — INSULIN LISPRO 3 UNITS: 100 INJECTION, SOLUTION INTRAVENOUS; SUBCUTANEOUS at 21:33

## 2020-02-10 RX ADMIN — GABAPENTIN 800 MG: 400 CAPSULE ORAL at 15:14

## 2020-02-10 RX ADMIN — SIMVASTATIN 10 MG: 10 TABLET, FILM COATED ORAL at 21:21

## 2020-02-10 RX ADMIN — Medication 1.5 G: at 10:06

## 2020-02-10 RX ADMIN — INSULIN LISPRO 10 UNITS: 100 INJECTION, SOLUTION INTRAVENOUS; SUBCUTANEOUS at 17:29

## 2020-02-10 RX ADMIN — Medication 1.5 G: at 21:21

## 2020-02-10 RX ADMIN — ENOXAPARIN SODIUM 40 MG: 40 INJECTION SUBCUTANEOUS at 15:15

## 2020-02-10 RX ADMIN — LISINOPRIL 10 MG: 10 TABLET ORAL at 15:15

## 2020-02-10 RX ADMIN — PROPOFOL 150 MG: 10 INJECTION, EMULSION INTRAVENOUS at 13:15

## 2020-02-10 ASSESSMENT — PAIN DESCRIPTION - PAIN TYPE
TYPE: ACUTE PAIN

## 2020-02-10 ASSESSMENT — PAIN DESCRIPTION - ORIENTATION: ORIENTATION: LEFT

## 2020-02-10 ASSESSMENT — PAIN SCALES - GENERAL
PAINLEVEL_OUTOF10: 5
PAINLEVEL_OUTOF10: 6
PAINLEVEL_OUTOF10: 0
PAINLEVEL_OUTOF10: 0
PAINLEVEL_OUTOF10: 5
PAINLEVEL_OUTOF10: 5

## 2020-02-10 ASSESSMENT — PAIN DESCRIPTION - LOCATION
LOCATION: FOOT
LOCATION: FOOT

## 2020-02-10 ASSESSMENT — LIFESTYLE VARIABLES: SMOKING_STATUS: 1

## 2020-02-10 NOTE — PROGRESS NOTES
Hospitalist Progress Note      PCP: Jeanne Lerner    Date of Admission: 2/4/2020    Chief Complaint on Admission:   Post-op Problem       Pt reports had surgery with Dr Sebastián Olivas approx 3 weeks ago on left foot following stepping on a nail and getting an infection. Pt reports over the past 24 hours the pain and reddness has returned and worsened, pt also reports blisters forming on left foot.         History Of Present Illness:       59 y.o. male, with PMH of HTN, HLD, DM 2, polysubstance abuse, and left foot infection, who presented [facility] with left foot infection. History was obtained from the patient review of the EMR. The patient states that he stepped on a nail over 4 weeks ago and was previously treated for infection in his foot between the third and fourth toe. He was seen by Dr. Sebastián Olivas, podiatry, at [facility] and had an I&D of this wound on 1/16/2020. He states that he was on antibiotics at [facility], vancomycin and Merrem, and was discharged on oral Cipro and linezolid as his previous wound culture showed MRSA, Klebsiella, and enterococcus. He states that his recovery has been going very well until last night when he noticed that the top of his foot was very red, blistering, and seeping out serous drainage. He states that even upon waking in the morning, his foot looked normal.  He decided to come into the ED for further evaluation as he did not want the infection to get worse. The patient denies having any fever or chills. No loss of sensation in the foot. He is able to wiggle his toes. Currently has a nonadherent pad with Kerlix wrapped around his left foot. The patient denied any other associated symptoms as well as any aggravating and/or alleviating factors. At the time of this assessment, the patient was resting comfortably in bed.  He currently denies any chest pain, back pain, abdominal pain, shortness of breath, numbness, tingling, N/V/C/D, fever and/or chills.      The PODIATRY  IP CONSULT TO SPIRITUAL SERVICES  IP CONSULT TO CASE MANAGEMENT  IP CONSULT TO INFECTIOUS DISEASES    ASSESSMENT AND PLAN      Active Hospital Problems    Diagnosis Date Noted    Cellulitis of left foot [L03.116] 01/12/2020    Type 2 diabetes mellitus with hyperglycemia, with long-term current use of insulin (HCC) [E11.65, Z79.4]     Hyperlipidemia [E78.5]     Essential hypertension [I10]        Diabetic foot ulcer and cellulitis to left foot: Uncontrolled. +MRSA, klebsiella, and enterococcus on previous wound culture. (was discharged home on linezolid and cipro previously at [facility])  - Micro: Wound culture: + MRSA 2/5/20  - Optimize Glycemic control  -Antibiotics: Vancomycin:   -Left foot x-ray: No fracture or malalignment  -Podiatry consultation:  MRI ordered to eval for deep tissue abscess formation. No evidence of abscess formation. Podiatry sign off. FU as outpt. - MRI: 2/6/2020  1. Nonspecific diffuse subcutaneous edema.  Findings may reflect bland edema  versus cellulitis.  Correlate clinically.  No organized drainable collections  are identified. 2. No evidence for osteomyelitis. 3. Mild osteoarthritis.  -Local wound care, wound consultation  -Pain control: Judicious use of opiates while using Suboxone.      Diabetes mellitus type 2 uncontrolled with hemoglobin A1c 13.8 on 1/12/2020  -Medications: Lantus, sliding scale insulin, lispro bolus  -Labs: Hemoglobin A1c monitoring  -Diet: Low concentrated carbs  -Blood sugar monitoring [facility]     Opiate use disorder: Patient states using IV heroin. Last use less than 24 hours ago. Began habit in August/2019. Denies that he has any physical dependence. Denies any current sx of withdrawal on presentation to hospital.  States that he will be able to quit whenever he is ready. \" Im not really addicited, I just picked up a bad habit\". Presently complaining of irritability, anxiousness, and restlessness.    -MAT initiated due to poorly possibly Monday 10 Feb pending BENJAMIN. If DC home, will refer to suboxone clinic on discharge.     Amanda Underwood MD

## 2020-02-10 NOTE — PROGRESS NOTES
Hospitalist Progress Note      PCP: Dago Gonzalez    Date of Admission: 2/4/2020    Chief Complaint on Admission:   Post-op Problem       Pt reports had surgery with Dr Marcellus Blancas approx 3 weeks ago on left foot following stepping on a nail and getting an infection. Pt reports over the past 24 hours the pain and reddness has returned and worsened, pt also reports blisters forming on left foot.         History Of Present Illness:       59 y.o. male, with PMH of HTN, HLD, DM 2, polysubstance abuse, and left foot infection, who presented to [facility] with left foot infection. History was obtained from the patient review of the EMR. The patient states that he stepped on a nail over 4 weeks ago and was previously treated for infection in his foot between the third and fourth toe. He was seen by Dr. Marcellus Blancas, podiatry, at [facility] and had an I&D of this wound on 1/16/2020. He states that he was on antibiotics at [facility], vancomycin and Merrem, and was discharged on oral Cipro and linezolid as his previous wound culture showed MRSA, Klebsiella, and enterococcus. He states that his recovery has been going very well until last night when he noticed that the top of his foot was very red, blistering, and seeping out serous drainage. He states that even upon waking in the morning, his foot looked normal.  He decided to come into the ED for further evaluation as he did not want the infection to get worse. The patient denies having any fever or chills. No loss of sensation in the foot. He is able to wiggle his toes. Currently has a nonadherent pad with Kerlix wrapped around his left foot. The patient denied any other associated symptoms as well as any aggravating and/or alleviating factors. At the time of this assessment, the patient was resting comfortably in bed.  He currently denies any chest pain, back pain, abdominal pain, shortness of breath, numbness, tingling, N/V/C/D, fever and/or chills.      The patient does admit to previously not having cared about his health at all. He states that he used to not monitor his blood sugar or even take his medications. He is very motivated to start taking better care of himself.     Of note, the patient does state that he is an active drug user. He uses heroin and methamphetamine on an intermittent basis, last use being the day before yesterday. He denies ever having gone through any type of withdrawal symptoms. He states that he would like information about checking into rehab facility after discharge from the hospital.    Pt Seen/Examined and Chart Reviewed. Admitting dx: L foot cellulitis    SUBJECTIVE:   Withdrawal symptoms worse overnight. Increased suboxone dose. Awaiting BENJAMIN tomorrow for further treatment plan. ID following.     OBJECTIVE:     Allergies  Keflex [cephalexin]    Medications      Scheduled Meds:   sodium chloride flush  10 mL Intravenous 2 times per day    insulin lispro  10 Units Subcutaneous TID     buprenorphine-naloxone  3 Film Sublingual Daily    vancomycin  1,500 mg Intravenous Q12H    aspirin  81 mg Oral Daily    gabapentin  800 mg Oral TID    insulin glargine  40 Units Subcutaneous Nightly    lisinopril  10 mg Oral Daily    simvastatin  10 mg Oral Nightly    doxazosin  1 mg Oral Daily    Vitamin D  1,000 Units Oral Daily    sodium chloride flush  10 mL Intravenous 2 times per day    enoxaparin  40 mg Subcutaneous Daily    insulin lispro  0-18 Units Subcutaneous TID     insulin lispro  0-9 Units Subcutaneous Nightly       Infusions:   dextrose         PRN Meds:  traMADol **AND** cloNIDine, traZODone, oxyCODONE, perflutren lipid microspheres, sodium chloride flush, magnesium hydroxide, ondansetron, acetaminophen, glucose, dextrose, glucagon (rDNA), dextrose    Intake and Output     Intake/Output Summary (Last 24 hours) at 2/10/2020 1553  Last data filed at 2/10/2020 1328  Gross per 24 hour   Intake 2250 ml   Output --   Net 2250 ml       Vitals    BP 97/62   Pulse 90   Temp 98.6 °F (37 °C) (Oral)   Resp 18   Ht 6' 1\" (1.854 m)   Wt 200 lb 6.4 oz (90.9 kg) Comment: 2 heated blankets, sheet, 2 pillows  SpO2 94%   BMI 26.44 kg/m²     Exam:  General appearance:  No apparent distress, appears stated age and cooperative. Disheveled appearance  HEENT:  Normal cephalic, atraumatic without obvious deformity. Pupils equal, round, and reactive to light. Extra ocular muscles intact. Conjunctivae/corneas clear. Poor dentition  Neck: Supple, with full range of motion. No jugular venous distention. Trachea midline. Respiratory:  Normal respiratory effort. Clear to auscultation, bilaterally without Rales/Wheezes/Rhonchi. Cardiovascular:  Regular rate and rhythm with normal S1/S2 without murmurs, rubs or gallops. Abdomen: Soft, non-tender, non-distended with normal bowel sounds. Musculoskeletal:  No clubbing, cyanosis or edema bilaterally. Full range of motion without deformity. Skin: Skin color, texture, turgor normal.  No rashes or lesions. Left foot: Increased edema to level of ankle, +2 pitting. Brawny skin changes with wrinkling and blister formation with active weeping of serous discharge to forefoot. Neurologic:  Neurovascularly intact without any focal sensory/motor deficits. Cranial nerves: II-XII intact, grossly non-focal.  Psychiatric:  Alert and oriented, thought content appropriate, normal insight  Capillary Refill: Brisk,< 3 seconds   Peripheral Pulses: +2 palpable, equal bilaterally       Data    No results for input(s): WBC, HGB, HCT, PLT in the last 72 hours. No results for input(s): NA, K, CL, CO2, PHOS, BUN, CREATININE in the last 72 hours. Invalid input(s): CA  No results for input(s): AST, ALT, ALB, BILIDIR, BILITOT, ALKPHOS in the last 72 hours. No results for input(s): INR in the last 72 hours. No results for input(s): CKTOTAL, CKMB, CKMBINDEX, TROPONINI in the last 72 hours.     Consults:     IP CONSULT and restlessness. -MAT initiated due to poorly controlled pain and concern for heroin relapse. Bup/Nal started 4 mg for relapse prevention and harm reduction. Increased to 6mg for worsened withdrawal symptoms. Cont to reassess with COWS.   - Judicious use of opiates for pain control. Oxycodone 5 mg Q4.  - Monitor for sx of acute withdrawal   -Recommend starting patient with counseling services in an addiction clinic upon discharge. Please have patient upon discharge go to Addiction facility of choice for induction of Suboxone for treatment. Bacteremia:  Streptococcus+. New finding on blood cultures. - Await final ID and sensitivity  -Abx: maintain Zosyn  - ECHO: 2/6/2020  Normal left ventricle size and systolic function with a visually estimated   ejection fraction of 55%.   Mild concentric left ventricular hypertrophy.   No regional wall motion abnormalities are seen.   Normal left ventricular diastolic filling pressure.   The right atrium is mildly enlarged.   The aortic root is dilated at 4.2 cm.   Mild mitral, aortic, pulmonic, and tricuspid regurgitation.   Systolic pulmonary artery pressure (SPAP) is normal and estimated at 38 mmHg   (right atrial pressure 15 mmHg).   The IVC is dilated in size (>2.1 cm) and collapses <50% with respiration   consistent with elevated right atrial pressures (15 mmHg) .   No obvious masses, thrombi, or vegetations are noted. Consider BENJAMIN if there   if clinically indicated. - Consult ID:  Appreciate recommendations.  BENJAMIN planned for Monday, if clear then descalate to oral abx.       Essential hypertension and hyperlipidemia with no known coronary artery disease: stable without anginal equivalent  -Continue home medication regimen     Hyponatremia, Resolved, likely secondary to hyperglycemia  -IV fluid bolus completed  -Abs: BMP monitoring      DVT Prophylaxis: LMWH  Diet: DIET CARB CONTROL; Carb Control: 4 carb choices (60 gms)/meal  Code Status: Full Code       PT/OT Eval Status: No need identified     Dispo - possibly Monday 10 Feb pending BENJAMIN. If DC home, will refer to suboxone clinic on discharge.     Severiano Medina MD

## 2020-02-10 NOTE — PROGRESS NOTES
Assessment completed and documented. VSS. A/ox4. NPO at midnight. Patient anxious about discharge and wants to talk to case management about rehab choices. LLE wrapped with ace, cap refill less than 3 seconds. Denies pain. Denies further needs at this time. Bed locked and in lowest position. Bedside table and call light within reach. Will continue to monitor.

## 2020-02-10 NOTE — PROGRESS NOTES
Patient expressed anger and agitation that he has not had his scheduled procedure yet today. Patient with orders to have a BENJAMIN today. Patient told it would be at 0800. Contacted cath lab regarding when patient was scheduled, they do not know a time yet. Explained to patient that they did not have a time yet but they were aware he was scheduled. Patient making remarks on how nothing gets done on time. Attempted to educate patient on how patients were prioritized base on severity of illness with little success. In bed with call light in reach.

## 2020-02-10 NOTE — OP NOTE
OP note  Procedure BENJAMIN  Endocarditis IV drug abuse.   complications none  Findings   No signs of endocarditis  No PFO   normal LV function   full report to follow

## 2020-02-10 NOTE — ANESTHESIA POSTPROCEDURE EVALUATION
Department of Anesthesiology  Postprocedure Note    Patient: Fernandez Geiger  MRN: [id number]  YOB: 1955  Date of evaluation: 2/10/2020  Time:  3:03 PM     Procedure Summary     Date:  02/10/20 Room / Location:  [facility] Cardiac Cath Lab    Anesthesia Start:  1001 Anesthesia Stop:  1330    Procedure:  TRANSESOPHAGEAL ECHO Diagnosis:      Scheduled Providers:   Responsible Provider:  Patrica Orta MD    Anesthesia Type:  TIVA ASA Status:  3          Anesthesia Type: TIVA    Brian Phase I:      Brian Phase II:      Last vitals: Reviewed and per EMR flowsheets.        Anesthesia Post Evaluation    Patient location during evaluation: PACU  Level of consciousness: awake  Airway patency: patent  Nausea & Vomiting: no nausea  Complications: no  Cardiovascular status: blood pressure returned to baseline  Respiratory status: acceptable  Hydration status: euvolemic

## 2020-02-10 NOTE — ANESTHESIA PRE PROCEDURE
Department of Anesthesiology  Preprocedure Note       Name:  Srinivas Shah   Age:  59 y.o.  :  1955                                          MRN:  [id number]         Date:  2/10/2020      Surgeon: * Surgery not found *    Procedure: TRANSESOPHAGEAL ECHO    Medications prior to admission:   Prior to Admission medications    Medication Sig Start Date End Date Taking? Authorizing Provider   lisinopril (PRINIVIL) 10 MG tablet Take 1 tablet by mouth daily 20  Yes Juan Antonio Hopper MD   Vitamin D, Cholecalciferol, 25 MCG (1000 UT) TABS Take 1 tablet by mouth daily   Yes Historical Provider, MD   terazosin (HYTRIN) 1 MG capsule Take 1 mg by mouth nightly   Yes Historical Provider, MD   gabapentin (NEURONTIN) 400 MG capsule Take 800 mg by mouth 3 times daily. .   Yes Historical Provider, MD   glipiZIDE (GLUCOTROL) 10 MG tablet Take 10 mg by mouth 2 times daily (before meals). Yes Historical Provider, MD   metFORMIN (GLUCOPHAGE) 1000 MG tablet Take 1,000 mg by mouth 2 times daily (with meals). Yes Historical Provider, MD   insulin glargine (LANTUS) 100 UNIT/ML injection vial Inject 40 Units into the skin nightly    Yes Historical Provider, MD   aspirin 81 MG tablet Take 81 mg by mouth daily. Yes Historical Provider, MD   simvastatin (ZOCOR) 10 MG tablet Take  by mouth nightly.    Yes Historical Provider, MD       Current medications:    Current Facility-Administered Medications   Medication Dose Route Frequency Provider Last Rate Last Dose    insulin lispro (HUMALOG) injection vial 10 Units  10 Units Subcutaneous TID  Cara Nathan MD   10 Units at 20 1746    buprenorphine-naloxone (SUBOXONE) 2-0.5 MG SL film 3 Film  3 Film Sublingual Daily Cara Nathan MD        traMADol Dionne Skates) tablet 50 mg  50 mg Oral PRN Belen Espinoza MD        And    cloNIDine (CATAPRES) tablet 0.1 mg  0.1 mg Oral PRN Belen Espinoza MD        traZODone (DESYREL) tablet 50 mg  50 mg Oral Nightly PRN Belen Espinoza MD   50

## 2020-02-10 NOTE — PROGRESS NOTES
Infectious Disease Follow up Notes    CC :  Bacteremia, L DFI, IVDU       Antibiotics:   vanc 1500 q12    Admit Date:   2/4/2020  Hospital Day: 7    Subjective:   He remains afebrile   NPO for BENJAMIN today    He is frustrated at BENJAMIN delay and is hungry. His L foot is feeling better. Objective:     Patient Vitals for the past 8 hrs:   BP Temp Temp src Pulse Resp SpO2   02/10/20 0815 97/62 98.6 °F (37 °C) Oral 90 18 94 %       EXAM:  General:  Alert, flat affect. NAD     HEENT:  PERRL, sclera anicteric. Poor dentition. No thrush    NECK:  supple, no LAD   LUNGS:  CTA cesar, no W/R/R  CV:   RRR with unchanged murmur  ABD:  Soft, flat, NT. Nl bowel sounds   EXT:  No LE edema. Very faint erythema lower L Leg  Distal L foot with faint erythema, +scale/desquamation. No purulence.     SKIN: No acute changes      LINE:  PIV site ok       Scheduled Meds:   insulin lispro  10 Units Subcutaneous TID WC    buprenorphine-naloxone  3 Film Sublingual Daily    vancomycin  1,500 mg Intravenous Q12H    aspirin  81 mg Oral Daily    gabapentin  800 mg Oral TID    insulin glargine  40 Units Subcutaneous Nightly    lisinopril  10 mg Oral Daily    simvastatin  10 mg Oral Nightly    doxazosin  1 mg Oral Daily    Vitamin D  1,000 Units Oral Daily    sodium chloride flush  10 mL Intravenous 2 times per day    enoxaparin  40 mg Subcutaneous Daily    insulin lispro  0-18 Units Subcutaneous TID WC    insulin lispro  0-9 Units Subcutaneous Nightly       Continuous Infusions:   dextrose            Data Review:    Lab Results   Component Value Date    WBC 5.0 02/07/2020    HGB 12.1 (L) 02/07/2020    HCT 35.4 (L) 02/07/2020    MCV 85.5 02/07/2020     02/07/2020     Lab Results   Component Value Date    CREATININE 0.9 02/07/2020    BUN 14 02/07/2020     (L) 02/07/2020    K 4.8 02/07/2020    CL 95 (L) 02/07/2020    CO2 31 02/07/2020 Hepatic Function Panel:   Lab Results   Component Value Date    ALKPHOS 105 02/07/2020    ALT 45 02/07/2020    AST 31 02/07/2020    PROT 5.4 02/07/2020    BILITOT 0.4 02/07/2020    LABALBU 3.1 02/07/2020       Cultures:   1/8       BC x2 neg  1/11     BC x2 neg   1/13     Wound culture GpBS, E faecalis, Klebsiella pneumoniae, MRSA  2/4       BC #1 non-hemolytic strep               BC #2 neg  2/5       Wound culture MRSA   Staph aureus mrsa           Antibiotic Interpretation JESSICA Status     ciprofloxacin Sensitive <=0.5 mcg/mL       clindamycin Sensitive <=0.25 mcg/mL       erythromycin Resistant >=8 mcg/mL       oxacillin Resistant >=4 mcg/mL       tetracycline Resistant >=16 mcg/mL       trimethoprim-sulfamethoxazole Sensitive <=10 mcg/mL       vancomycin Sensitive 1 mcg/mL          2/7 BC x2 NGTD        Radiology Review:  All pertinent images / reports were reviewed as a part of this visit. MRI FOOT LEFT WO CONTRAST   Final Result   1. Focus of susceptibility artifact in the plantar aspect of the 3rd   interdigital space corresponding to the foreign body seen on the recent   radiographs.  Possible underlying abscess versus phlegmon measuring   approximately 4 x 1.5 x 1 cm.  Diffuse cellulitis.    2. Very mild marrow edema in the 3rd proximal phalanx with normal T1 signal   compatible with noninfectious reactive osteitis.  Osteomyelitis is unlikely.           XR FOOT LEFT (MIN 3 VIEWS)   Final Result   2 mm radiopaque density within the plantar soft tissues of the forefoot   between the 3rd and 4th digit which may reflect a foreign body.  Mild soft   tissue swelling without acute osseous abnormality.          Assessment:     Patient Active Problem List    Diagnosis Date Noted    Type 2 diabetes mellitus with foot ulcer, with long-term current use of insulin ([address] 01/13/2020    Cellulitis of toe of left foot     Septicemia ([address] 01/12/2020    Cellulitis of left foot 01/12/2020    Syncope 11/08/2019   

## 2020-02-11 VITALS
HEART RATE: 81 BPM | TEMPERATURE: 97.9 F | RESPIRATION RATE: 20 BRPM | BODY MASS INDEX: 26.56 KG/M2 | DIASTOLIC BLOOD PRESSURE: 79 MMHG | HEIGHT: 73 IN | WEIGHT: 200.4 LBS | OXYGEN SATURATION: 98 % | SYSTOLIC BLOOD PRESSURE: 135 MMHG

## 2020-02-11 LAB
ALBUMIN SERPL-MCNC: 3.3 G/DL (ref 3.4–5)
ANION GAP SERPL CALCULATED.3IONS-SCNC: 8 MMOL/L (ref 3–16)
BLOOD CULTURE, ROUTINE: NORMAL
BUN BLDV-MCNC: 15 MG/DL (ref 7–20)
CALCIUM SERPL-MCNC: 8.6 MG/DL (ref 8.3–10.6)
CHLORIDE BLD-SCNC: 102 MMOL/L (ref 99–110)
CO2: 28 MMOL/L (ref 21–32)
CREAT SERPL-MCNC: 0.8 MG/DL (ref 0.8–1.3)
CULTURE, BLOOD 2: NORMAL
GFR AFRICAN AMERICAN: >60
GFR NON-AFRICAN AMERICAN: >60
GLUCOSE BLD-MCNC: 157 MG/DL (ref 70–99)
GLUCOSE BLD-MCNC: 185 MG/DL (ref 70–99)
GLUCOSE BLD-MCNC: 226 MG/DL (ref 70–99)
GLUCOSE BLD-MCNC: 330 MG/DL (ref 70–99)
GLUCOSE BLD-MCNC: 94 MG/DL (ref 70–99)
HCT VFR BLD CALC: 35.4 % (ref 40.5–52.5)
HEMOGLOBIN: 12.1 G/DL (ref 13.5–17.5)
MCH RBC QN AUTO: 29.1 PG (ref 26–34)
MCHC RBC AUTO-ENTMCNC: 34 G/DL (ref 31–36)
MCV RBC AUTO: 85.6 FL (ref 80–100)
PDW BLD-RTO: 14.3 % (ref 12.4–15.4)
PERFORMED ON: ABNORMAL
PERFORMED ON: NORMAL
PHOSPHORUS: 4.3 MG/DL (ref 2.5–4.9)
PLATELET # BLD: 197 K/UL (ref 135–450)
PMV BLD AUTO: 8.7 FL (ref 5–10.5)
POTASSIUM SERPL-SCNC: 4.4 MMOL/L (ref 3.5–5.1)
RBC # BLD: 4.14 M/UL (ref 4.2–5.9)
SODIUM BLD-SCNC: 138 MMOL/L (ref 136–145)
WBC # BLD: 4.9 K/UL (ref 4–11)

## 2020-02-11 PROCEDURE — 6360000002 HC RX W HCPCS: Performed by: INTERNAL MEDICINE

## 2020-02-11 PROCEDURE — 80069 RENAL FUNCTION PANEL: CPT

## 2020-02-11 PROCEDURE — 6360000002 HC RX W HCPCS: Performed by: NURSE PRACTITIONER

## 2020-02-11 PROCEDURE — 2580000003 HC RX 258: Performed by: INTERNAL MEDICINE

## 2020-02-11 PROCEDURE — 2580000003 HC RX 258: Performed by: NURSE PRACTITIONER

## 2020-02-11 PROCEDURE — 85027 COMPLETE CBC AUTOMATED: CPT

## 2020-02-11 PROCEDURE — 36415 COLL VENOUS BLD VENIPUNCTURE: CPT

## 2020-02-11 PROCEDURE — 6370000000 HC RX 637 (ALT 250 FOR IP): Performed by: NURSE PRACTITIONER

## 2020-02-11 PROCEDURE — 6370000000 HC RX 637 (ALT 250 FOR IP): Performed by: INTERNAL MEDICINE

## 2020-02-11 RX ORDER — AMOXICILLIN AND CLAVULANATE POTASSIUM 875; 125 MG/1; MG/1
1 TABLET, FILM COATED ORAL 2 TIMES DAILY
Qty: 14 TABLET | Refills: 0 | Status: SHIPPED | OUTPATIENT
Start: 2020-02-11 | End: 2020-02-11 | Stop reason: HOSPADM

## 2020-02-11 RX ORDER — OXYCODONE HYDROCHLORIDE AND ACETAMINOPHEN 5; 325 MG/1; MG/1
1 TABLET ORAL EVERY 6 HOURS PRN
Qty: 28 TABLET | Refills: 0 | Status: SHIPPED | OUTPATIENT
Start: 2020-02-11 | End: 2020-02-18

## 2020-02-11 RX ORDER — CLINDAMYCIN HYDROCHLORIDE 300 MG/1
300 CAPSULE ORAL 3 TIMES DAILY
Qty: 21 CAPSULE | Refills: 0 | Status: SHIPPED | OUTPATIENT
Start: 2020-02-11 | End: 2020-02-18

## 2020-02-11 RX ADMIN — Medication 10 ML: at 10:05

## 2020-02-11 RX ADMIN — GABAPENTIN 800 MG: 400 CAPSULE ORAL at 15:19

## 2020-02-11 RX ADMIN — INSULIN LISPRO 3 UNITS: 100 INJECTION, SOLUTION INTRAVENOUS; SUBCUTANEOUS at 09:23

## 2020-02-11 RX ADMIN — LISINOPRIL 10 MG: 10 TABLET ORAL at 09:56

## 2020-02-11 RX ADMIN — INSULIN LISPRO 12 UNITS: 100 INJECTION, SOLUTION INTRAVENOUS; SUBCUTANEOUS at 12:12

## 2020-02-11 RX ADMIN — Medication 1.5 G: at 10:18

## 2020-02-11 RX ADMIN — TRAZODONE HYDROCHLORIDE 50 MG: 50 TABLET ORAL at 00:17

## 2020-02-11 RX ADMIN — ENOXAPARIN SODIUM 40 MG: 40 INJECTION SUBCUTANEOUS at 09:55

## 2020-02-11 RX ADMIN — ASPIRIN 81 MG 81 MG: 81 TABLET ORAL at 09:56

## 2020-02-11 RX ADMIN — VITAMIN D, TAB 1000IU (100/BT) 1000 UNITS: 25 TAB at 09:56

## 2020-02-11 RX ADMIN — DOXAZOSIN 1 MG: 2 TABLET ORAL at 09:55

## 2020-02-11 RX ADMIN — INSULIN LISPRO 10 UNITS: 100 INJECTION, SOLUTION INTRAVENOUS; SUBCUTANEOUS at 09:25

## 2020-02-11 RX ADMIN — GABAPENTIN 800 MG: 400 CAPSULE ORAL at 09:56

## 2020-02-11 RX ADMIN — OXYCODONE 5 MG: 5 TABLET ORAL at 06:18

## 2020-02-11 RX ADMIN — Medication 10 ML: at 10:18

## 2020-02-11 RX ADMIN — OXYCODONE 5 MG: 5 TABLET ORAL at 00:17

## 2020-02-11 RX ADMIN — INSULIN LISPRO 10 UNITS: 100 INJECTION, SOLUTION INTRAVENOUS; SUBCUTANEOUS at 12:13

## 2020-02-11 RX ADMIN — BUPRENORPHINE HYDROCHLORIDE, NALOXONE HYDROCHLORIDE 3 FILM: 2; .5 FILM, SOLUBLE BUCCAL; SUBLINGUAL at 09:55

## 2020-02-11 ASSESSMENT — PAIN DESCRIPTION - ORIENTATION: ORIENTATION: LEFT

## 2020-02-11 ASSESSMENT — PAIN DESCRIPTION - PAIN TYPE: TYPE: ACUTE PAIN

## 2020-02-11 ASSESSMENT — PAIN SCALES - GENERAL
PAINLEVEL_OUTOF10: 0
PAINLEVEL_OUTOF10: 8
PAINLEVEL_OUTOF10: 7

## 2020-02-11 ASSESSMENT — PAIN DESCRIPTION - LOCATION: LOCATION: FOOT

## 2020-02-11 NOTE — PROGRESS NOTES
Assessment as charted. A&O x 4. VSS. Ambulating independently. Compliant with care thus far this shift. Tolerating PO intake. Dressing to L foot is C/D/I. Continent of B/B, voiding without difficulty. Needs addressed to pt satisfaction. Bed in low position, wheels locked, SR x 2, call light and bedside table within reach. Will monitor.

## 2020-02-11 NOTE — CARE COORDINATION
Writer spoke with Grant/naomi at [facility], supervisor is reviewing referral, Eula Guerra will call writer back with status.   NOAH Koenig-RN

## 2020-02-11 NOTE — PROGRESS NOTES
patient does admit to previously not having cared about his health at all. He states that he used to not monitor his blood sugar or even take his medications. He is very motivated to start taking better care of himself.     Of note, the patient does state that he is an active drug user. He uses heroin and methamphetamine on an intermittent basis, last use being the day before yesterday. He denies ever having gone through any type of withdrawal symptoms. He states that he would like information about checking into rehab facility after discharge from the hospital.    Pt Seen/Examined and Chart Reviewed. Admitting dx: L foot cellulitis    SUBJECTIVE:   Withdrawal symptoms worse overnight. Increased suboxone dose. Awaiting BENJAMIN tomorrow for further treatment plan. ID following.     OBJECTIVE:     Allergies  Keflex [cephalexin]    Medications      Scheduled Meds:   sodium chloride flush  10 mL Intravenous 2 times per day    insulin lispro  10 Units Subcutaneous TID     buprenorphine-naloxone  3 Film Sublingual Daily    vancomycin  1,500 mg Intravenous Q12H    aspirin  81 mg Oral Daily    gabapentin  800 mg Oral TID    insulin glargine  40 Units Subcutaneous Nightly    lisinopril  10 mg Oral Daily    simvastatin  10 mg Oral Nightly    doxazosin  1 mg Oral Daily    Vitamin D  1,000 Units Oral Daily    sodium chloride flush  10 mL Intravenous 2 times per day    enoxaparin  40 mg Subcutaneous Daily    insulin lispro  0-18 Units Subcutaneous TID     insulin lispro  0-9 Units Subcutaneous Nightly       Infusions:   dextrose         PRN Meds:  traMADol **AND** cloNIDine, traZODone, oxyCODONE, perflutren lipid microspheres, sodium chloride flush, magnesium hydroxide, ondansetron, acetaminophen, glucose, dextrose, glucagon (rDNA), dextrose    Intake and Output     Intake/Output Summary (Last 24 hours) at 2/11/2020 0927  Last data filed at 2/11/2020 0556  Gross per 24 hour   Intake 1510 ml   Output --   Net Status: Full Code       PT/OT Eval Status: Requesting detox.     Dispo - possibly Tues 11 Feb pending official BENJAMIN results. If DC home, will refer to suboxone clinic on discharge.     Serjio Lopez MD

## 2020-02-11 NOTE — DISCHARGE INSTR - COC
Continuity of Care Form    Patient Name: Neelima Vasquez   :  1955  MRN:  [id number]    Admit date:  2020  Discharge date:  2020    Code Status Order: Full Code   Advance Directives:   [facility] Documentation     Date/Time Healthcare Directive Type of Healthcare Directive Copy in [facility] [address] Agent's Name Healthcare Agent's Phone Number    20 1115  No, patient does not have an advance directive for healthcare treatment -- -- -- -- --    20 0155  No, patient does not have an advance directive for healthcare treatment -- -- -- -- --          Admitting Physician:  Omar Cash MD  PCP: Minda Alberto    Discharging Nurse: mary carmen [facility] Unit/Room#: 8785/4562-26  Discharging Unit Phone Number: [phone]    Emergency Contact:   Extended Emergency Contact Information  Primary Emergency Contact: Antonio Mendoza  Address: [address] [place] [address] Phone: [phone]  Relation: Child    Past Surgical History:  Past Surgical History:   Procedure Laterality Date    ANKLE FRACTURE SURGERY Right 11/10/2019    ORIF right ankle    ANKLE FRACTURE SURGERY Right 11/10/2019    ANKLE OPEN REDUCTION INTERNAL FIXATION performed by Timi Miranda DO at [facility] N/A 2020    INCISION AND DEBRIDEMENT LEFT FOOT performed by Madeline Del Rosario DPM at [address] [place] [address]  2020    incision and debridement left foot       Immunization History:   Immunization History   Administered Date(s) Administered    Influenza, Quadv, IM, PF (6 mo and older Fluzone, Flulaval, Fluarix, and 3 yrs and older Afluria) 2020    Tdap (Boostrix, Adacel) 2019, 2020       Active Problems:  Patient Active Problem List   Diagnosis Code    Syncope and collapse R55    Thyroid nodule E04.1    Syncope R55    Closed fracture of right ankle S82.891A    Severe hyperglycemia due to diabetes mellitus ([facility]) E11.65    Methamphetamine abuse ([facility]) F15.10    Elevated d-dimer R79.89    Type 2 diabetes mellitus with hyperglycemia, with long-term current use of insulin ([facility]) E11.65, Z79.4    Essential hypertension I10    Hyperlipidemia E78.5    Septicemia ([facility]) A41.9    Cellulitis of left foot L03. 80    Type 2 diabetes mellitus with foot ulcer, with long-term current use of insulin ([facility]) E11.621, L97.509, Z79.4    Cellulitis of toe of left foot L03.032       Isolation/Infection:   Isolation          Contact        Patient Infection Status     Infection Onset Added Last Indicated Last Indicated By Review Planned Expiration Resolved Resolved By    [facility] 01/13/20 01/14/20 02/05/20 Wound Culture              Nurse Assessment:  Last Vital Signs: /73   Pulse 77   Temp 97.6 °F (36.4 °C) (Oral)   Resp 20   Ht 6' 1\" (1.854 m)   Wt 200 lb 6.4 oz (90.9 kg) Comment: 2 heated blankets, sheet, 2 pillows  SpO2 97%   BMI 26.44 kg/m²     Last documented pain score (0-10 scale): Pain Level: 7  Last Weight:   Wt Readings from Last 1 Encounters:   02/05/20 200 lb 6.4 oz (90.9 kg)     Mental Status:  oriented    IV Access:  - None    Nursing Mobility/ADLs:  Walking   Independent  Transfer  Assisted  Bathing  Independent  Dressing  Independent  Toileting  Independent  Feeding  [facility]   whole    Wound Care Documentation and Therapy:        Elimination:  Continence:   · Bowel: Yes  · Bladder: Yes  Urinary Catheter: None   Colostomy/Ileostomy/Ileal Conduit: No       Date of Last BM: 2/11      Intake/Output Summary (Last 24 hours) at 2/11/2020 1336  Last data filed at 2/11/2020 1317  Gross per 24 hour   Intake 2050 ml   Output --   Net 2050 ml     I/O last 3 completed shifts: In: 9488 [P.O.:1200; I.V.:310]  Out: -     Safety Concerns:      At Risk for Falls    Impairments/Disabilities: None    Nutrition Therapy:  Current Nutrition Therapy:   - Oral Diet:  Carb Control 4 carbs/meal (1800kcals/day)    Routes of Feeding: Oral  Liquids: Thin Liquids  Daily Fluid Restriction: no  Last Modified Barium Swallow with Video (Video Swallowing Test): not done    Treatments at the Time of Hospital Discharge:   Respiratory Treatments: ***  Oxygen Therapy:  is not on home oxygen therapy. Ventilator:    - No ventilator support    Rehab Therapies: Drug Rehab  Weight Bearing Status/Restrictions: left foot  Other Medical Equipment (for information only, NOT a DME order):  {EQUIPMENT:[id number]}  Other Treatments: ***    Patient's personal belongings (please select all that are sent with patient):  Glasses    RN SIGNATURE:  Electronically signed by Jay Cheatham RN on 2/11/20 at 5:04 PM    CASE MANAGEMENT/SOCIAL WORK SECTION    Inpatient Status Date: ***    Readmission Risk Assessment Score:  Readmission Risk              Risk of Unplanned Readmission:        31           Discharging to Facility/ Agency   · Name:   · Address:  · Phone:  · Fax:    Dialysis Facility (if applicable)   · Name:  · Address:  · Dialysis Schedule:  · Phone:  · Fax:    / signature: {Esignature:[id number]}    PHYSICIAN SECTION    Prognosis: Good    Condition at Discharge: Stable    Rehab Potential (if transferring to Rehab): Good    Recommended Labs or Other Treatments After Discharge: None    Physician Certification: I certify the above information and transfer of Kristen Harrell  is necessary for the continuing treatment of the diagnosis listed and that he requires [facility] for less 30 days.      Update Admission H&P: No change in H&P    PHYSICIAN SIGNATURE:  Electronically signed by Negrita Rand MD on 2/11/20 at 1:36 PM

## 2020-02-11 NOTE — CARE COORDINATION
Spoke with Jeffry Cardenas at [facility] and he stated since patient's Medicaid is not active he would have to get clearance from his medical director . This might not happen until tomorrow. Also Elizabeth Castañeda is trying to assist with getting patient emergency presumptive Medicaid which would become active immediately if approved. Patient has all information and phone numbers to [facility] and to [facility]. He spoke with Terrence Lane at [facility] and she has him scheduled at their [place] clinic tomorrow to start Suboxone. [facility] will also assist with patient's Medicaid Application. Patient needs all medications filled here meds to beds . Cab voucher provided for Transport home. If patient ends up going to [facility] it is a 90 day residential program. If he does not go to inpatient treatment after discharge and then he  through the outpatient program at [facility]. Addendum: Per Elizabeth Castañeda presumptive Medicaid was approved and is active #[id number]. [facility] can start patient in their program tomorrow per Terrence Lane. Per Jeffry Cardenas at [facility] they can accept patient when a bed opens up. Per Jeffry Cardenas in admissions patient is to call them first thing in the morning. Both [facility] and [facility] will provide transportation and pick the patient up. Patient has been provided all follow up information, phone numbers and substance abuse resource packet.  Patient verbalized understanding of the need to call first thing in th AM .

## 2020-02-12 ENCOUNTER — TELEPHONE (OUTPATIENT)
Dept: INFECTIOUS DISEASES | Age: 65
End: 2020-02-12

## 2020-02-12 NOTE — DISCHARGE SUMMARY
Details   lisinopril (PRINIVIL) 10 MG tablet Take 1 tablet by mouth daily, Disp-30 tablet, R-1Print      Vitamin D, Cholecalciferol, 25 MCG (1000 UT) TABS Take 1 tablet by mouth dailyHistorical Med      terazosin (HYTRIN) 1 MG capsule Take 1 mg by mouth nightlyHistorical Med      gabapentin (NEURONTIN) 400 MG capsule Take 800 mg by mouth 3 times daily. Naveen Padilla Historical Med      glipiZIDE (GLUCOTROL) 10 MG tablet Take 10 mg by mouth 2 times daily (before meals). metFORMIN (GLUCOPHAGE) 1000 MG tablet Take 1,000 mg by mouth 2 times daily (with meals). insulin glargine (LANTUS) 100 UNIT/ML injection vial Inject 40 Units into the skin nightly Historical Med      aspirin 81 MG tablet Take 81 mg by mouth daily. simvastatin (ZOCOR) 10 MG tablet Take  by mouth nightly. Time Spent on discharge is more than 30 minutes in the examination, evaluation, counseling and review of medications and discharge plan. Signed:    Twyla Ivy MD   2/12/2020      Thank you Min Sierra for the opportunity to be involved in this patient's care. If you have any questions or concerns please feel free to contact me at [phone].

## 2020-02-12 NOTE — PROGRESS NOTES
Pt antibiotics filled in pharmacy but did not get percocet filled due to not having money to get it filled. David called to take pt home. Suzanne

## 2020-02-12 NOTE — TELEPHONE ENCOUNTER
He states he was just recently discharged from the hospital. He states he is trying to go [facility], but they need a letter or results stating that his last labs culture was clear before they will accept him.     Please fax to [phone]  Phone is [phone]

## 2020-11-03 PROBLEM — R55 SYNCOPE: Status: RESOLVED | Noted: 2019-11-08 | Resolved: 2020-11-03

## 2021-07-22 ENCOUNTER — HOSPITAL ENCOUNTER (EMERGENCY)
Age: 66
Discharge: HOME | End: 2021-07-22
Attending: EMERGENCY MEDICINE
Payer: MEDICAID

## 2021-07-22 VITALS
DIASTOLIC BLOOD PRESSURE: 78 MMHG | TEMPERATURE: 98 F | RESPIRATION RATE: 18 BRPM | WEIGHT: 164 LBS | HEIGHT: 73 IN | HEART RATE: 75 BPM | BODY MASS INDEX: 21.74 KG/M2 | SYSTOLIC BLOOD PRESSURE: 135 MMHG | OXYGEN SATURATION: 98 %

## 2021-07-22 DIAGNOSIS — S62.502D: Primary | ICD-10-CM

## 2021-07-22 PROCEDURE — 99283 EMERGENCY DEPT VISIT LOW MDM: CPT

## 2021-07-22 RX ORDER — OXYCODONE HYDROCHLORIDE AND ACETAMINOPHEN 5; 325 MG/1; MG/1
1 TABLET ORAL EVERY 6 HOURS PRN
Qty: 10 TABLET | Refills: 0 | Status: SHIPPED | OUTPATIENT
Start: 2021-07-22 | End: 2021-07-25

## 2021-07-22 ASSESSMENT — ENCOUNTER SYMPTOMS
ABDOMINAL PAIN: 0
SHORTNESS OF BREATH: 0
BACK PAIN: 0

## 2021-07-22 ASSESSMENT — PAIN DESCRIPTION - ORIENTATION: ORIENTATION: LEFT

## 2021-07-22 ASSESSMENT — PAIN DESCRIPTION - LOCATION: LOCATION: HAND

## 2021-07-22 ASSESSMENT — PAIN SCALES - GENERAL: PAINLEVEL_OUTOF10: 6

## 2021-07-22 ASSESSMENT — PAIN DESCRIPTION - PAIN TYPE: TYPE: ACUTE PAIN

## 2021-07-22 NOTE — ED PROVIDER NOTES
[facility]      Pt Name: Kendy Honeycutt  MRN: [id number]  [place] 1955  Date of evaluation: 7/22/2021  Provider: Joy Bello MD    [address] [place] [address]       Chief Complaint   Patient presents with    Hand Injury     Pt states that he got his hand caught in a table saw last week and broke several bones in his hand. Reports being seen at [facility] and given follow up States that he saw [facility] and they referred him to [facility]. States that  is unable to get him in until next week and he feels he needs to be seen sooner because he has broken bones         HISTORY OF PRESENT ILLNESS   (Location/Symptom, Timing/Onset, Context/Setting, Quality, Duration, Modifying Factors, Severity)  Note limiting factors. Kendy Honeycutt is a 77 y.o. male who presents to the emergency department     This patient apparently was seen approximately 1 week ago at [facility] he apparently had an open thumb fracture I could not get access to that record. Due to the fact that they are not on epic  Apparently he was sutured and referred to their orthopedic physician he did go apparently yesterday or the day before and did follow-up with that orthopedic physician who felt that he should see a hand surgeon and the physician advised the patient to call himself which they did and of course no follow-up can be arranged for over a week    The history is provided by the patient. Nursing Notes were reviewed. REVIEW OF SYSTEMS    (2-9 systems for level 4, 10 or more for level 5)     Review of Systems   Constitutional: Negative for chills and fever. Respiratory: Negative for shortness of breath. Cardiovascular: Negative for chest pain. Gastrointestinal: Negative for abdominal pain. Musculoskeletal: Positive for arthralgias. Negative for back pain, joint swelling, myalgias and neck pain. Neurological: Negative for dizziness.    Psychiatric/Behavioral: Negative for behavioral problems. All other systems reviewed and are negative. Except as noted above the remainder of the review of systems was reviewed and negative. PAST MEDICAL HISTORY     Past Medical History:   Diagnosis Date    Arthritis     Hx of blood clots     Hyperlipidemia     Hypertension     MRSA (methicillin resistant staph aureus) culture positive 2/5/2020, 01/12/2020    LEFT FOOT ULCER    MRSA (methicillin resistant staph aureus) culture positive 02/04/2020    + bc    Type 2 diabetes mellitus with foot ulcer, with long-term current use of insulin ([facility] [address] 1/13/2020         SURGICAL HISTORY       Past Surgical History:   Procedure Laterality Date    ANKLE FRACTURE SURGERY Right 11/10/2019    ORIF right ankle    ANKLE FRACTURE SURGERY Right 11/10/2019    ANKLE OPEN REDUCTION INTERNAL FIXATION performed by Manav Chester DO at [address] N/A 1/13/2020    INCISION AND DEBRIDEMENT LEFT FOOT performed by Jesus Kaplan DPM at [place] [address]  01/13/2020    incision and debridement left foot         CURRENT MEDICATIONS       Previous Medications    ASPIRIN 81 MG TABLET    Take 81 mg by mouth daily. GABAPENTIN (NEURONTIN) 400 MG CAPSULE    Take 800 mg by mouth 3 times daily. [place] GLIPIZIDE (GLUCOTROL) 10 MG TABLET    Take 10 mg by mouth 2 times daily (before meals). INSULIN GLARGINE (LANTUS) 100 UNIT/ML INJECTION VIAL    Inject 40 Units into the skin nightly     LISINOPRIL (PRINIVIL) 10 MG TABLET    Take 1 tablet by mouth daily    METFORMIN (GLUCOPHAGE) 1000 MG TABLET    Take 1,000 mg by mouth 2 times daily (with meals). SIMVASTATIN (ZOCOR) 10 MG TABLET    Take  by mouth nightly.     TERAZOSIN (HYTRIN) 1 MG CAPSULE    Take 1 mg by mouth nightly    VITAMIN D, CHOLECALCIFEROL, 25 MCG (1000 UT) TABS    Take 1 tablet by mouth daily       ALLERGIES     Keflex [cephalexin]    FAMILY HISTORY     History reviewed. No pertinent family history. SOCIAL HISTORY       Social History     Socioeconomic History    Marital status:      Spouse name: None    Number of children: 2    Years of education: None    Highest education level: None   Occupational History    None   Tobacco Use    Smoking status: Current Every Day Smoker     Packs/day: 1.00     Types: Cigarettes    Smokeless tobacco: Former User   Vaping Use    Vaping Use: Never used   Substance and Sexual Activity    Alcohol use: No    Drug use: Not Currently     Types: Methamphetamines, Opiates , IV    Sexual activity: Never     Partners: Female   Other Topics Concern    None   Social History Narrative    None     Social Determinants of Health     Financial Resource Strain:     Difficulty of Paying Living Expenses:    Food Insecurity:     Worried About Running Out of Food in the Last Year:     Ran Out of Food in the Last Year:    Transportation Needs:     Lack of Transportation (Medical):  Lack of Transportation (Non-Medical):    Physical Activity:     Days of Exercise per Week:     Minutes of Exercise per Session:    Stress:     Feeling of Stress :    Social Connections:     Frequency of Communication with Friends and Family:     Frequency of Social Gatherings with Friends and Family:     Attends Confucianist Services:     Active Member of Clubs or Organizations:     Attends Club or Organization Meetings:     Marital Status:    Intimate Partner Violence:     Fear of Current or Ex-Partner:     Emotionally Abused:     Physically Abused:     Sexually Abused:        SCREENINGS               PHYSICAL EXAM    (up to 7 for level 4, 8 or more for level 5)     ED Triage Vitals [07/22/21 1104]   BP Temp Temp Source Pulse Resp SpO2 Height Weight   135/78 98 °F (36.7 °C) Oral 75 18 98 % 6' 1\" (1.854 m) 164 lb (74.4 kg)       Physical Exam  Vitals and nursing note reviewed. Constitutional:       General: He is not in acute distress. Appearance: Normal appearance. He is well-developed. He is not diaphoretic. HENT:      Head: Normocephalic. Eyes:      Conjunctiva/sclera: Conjunctivae normal.      Pupils: Pupils are equal, round, and reactive to light. Neck:      Thyroid: No thyromegaly. Cardiovascular:      Rate and Rhythm: Normal rate and regular rhythm. Heart sounds: Normal heart sounds. No murmur heard. No friction rub. No gallop. Pulmonary:      Effort: Pulmonary effort is normal. No respiratory distress. Breath sounds: Normal breath sounds. Abdominal:      General: Bowel sounds are normal. There is no distension. Palpations: Abdomen is soft. Tenderness: There is no abdominal tenderness. Musculoskeletal:         General: Tenderness and signs of injury present. No swelling or deformity. Cervical back: Normal range of motion and neck supple. Comments: Patient has several stitches in the left thumb he has some scabbing present but in all honesty it looks actually very good there is no signs of infection no red streaks no purulent drainage no foul smell   Neurological:      Mental Status: He is alert and oriented to person, place, and time. GCS: GCS eye subscore is 4. GCS verbal subscore is 5. GCS motor subscore is 6. Cranial Nerves: No cranial nerve deficit. Sensory: No sensory deficit. Motor: No abnormal muscle tone.       Coordination: Coordination normal.      Deep Tendon Reflexes: Reflexes normal.   Psychiatric:         Behavior: Behavior normal.         DIAGNOSTIC RESULTS     EKG: All EKG's are interpreted by the Emergency Department Physician who either signs or Co-signs this chart in the absence of a cardiologist.        RADIOLOGY:   Non-plain film images such as CT, Ultrasound and MRI are read by the radiologist. Plain radiographic images are visualized and preliminarily interpreted by the emergency physician with the below findings:        Interpretation per the Radiologist below, if available at the time of this note:    No orders to display           LABS:  No results found for this visit on 07/22/21. EMERGENCY DEPARTMENT COURSE and DIFFERENTIAL DIAGNOSIS/MDM:     Vitals:    07/22/21 1104   BP: 135/78   Pulse: 75   Resp: 18   Temp: 98 °F (36.7 °C)   TempSrc: Oral   SpO2: 98%   Weight: 164 lb (74.4 kg)   Height: 6' 1\" (1.854 m)           MDM      REASSESSMENT      This patient apparently just presented here to get a referral them after [place] said that they could not see him for over a week  He is still having some pain he was on oxycodone which he is run out so I will give him another prescription for that but I see no signs of infection is completed the 7-day course of Augmentin I think it is prudent that we get him into see a hand surgeon he says that he has seen the [facility] and is requesting to see them I find him this might be a good idea since  cannot seem and [facility] does not have hand surgeon besides  that takes call here at [facility] I said that he can call and try to get into [facility] otherwise he probably should go back to the original orthopedic physician to make that referral more solid    CRITICAL CARE TIME     CONSULTS:  None      PROCEDURES:     Procedures    MEDICATIONS GIVEN THIS VISIT:  Medications - No data to display     FINAL IMPRESSION      1. Open displaced fracture of phalanx of left thumb with routine healing, unspecified phalanx, subsequent encounter            DISPOSITION/PLAN   DISPOSITION Decision To Discharge 07/22/2021 11:43:27 AM      PATIENT REFERRED TO:  [facility]  [phone]  Schedule an appointment as soon as possible for a visit in 3 days  Call this afternoon for your appointment      DISCHARGE MEDICATIONS:  New Prescriptions    OXYCODONE-ACETAMINOPHEN (PERCOCET) 5-325 MG PER TABLET    Take 1 tablet by mouth every 6 hours as needed for Pain for up to 10 doses. Controlled Substances Monitoring  No flowsheet data found. (Please note that portions of this note were completed with a voice recognition program.  Efforts were made to edit the dictations but occasionally words are mis-transcribed.)    Patient was advised to return to the Emergency Department if there was any worsening.     Alicia Sexton MD (electronically signed)  Attending Emergency Physician         Lorena Tavares MD  07/22/21

## 2021-07-22 NOTE — ED NOTES
Pt discharge instructions, follow up and rx x1 reviewed with pt. Pt verbalized understanding. No further needs. Pt discharged at this time.         Johanne Pineda RN  07/22/21 3306

## 2024-06-25 NOTE — PROGRESS NOTES
Bedside report given to Teressa Pandya. Pt. denies further needs at this time. Call light is within reach.   Jes Ford RN Show Applicator Variable?: Yes Duration Of Freeze Thaw-Cycle (Seconds): 3 Post-Care Instructions: I reviewed with the patient in detail post-care instructions. Patient is to wear sunprotection, and avoid picking at any of the treated lesions. Pt may apply Vaseline to crusted or scabbing areas. Detail Level: Detailed Render Note In Bullet Format When Appropriate: No Consent: The patient's consent was obtained including but not limited to risks of crusting, scabbing, blistering, scarring, darker or lighter pigmentary change, recurrence, incomplete removal and infection. Medical Necessity Clause: This procedure was medically necessary because the lesions that were treated were: Spray Paint Text: The liquid nitrogen was applied to the skin utilizing a spray paint frosting technique. Medical Necessity Information: It is in your best interest to select a reason for this procedure from the list below. All of these items fulfill various CMS LCD requirements except the new and changing color options.
